# Patient Record
Sex: FEMALE | Race: BLACK OR AFRICAN AMERICAN | NOT HISPANIC OR LATINO | Employment: FULL TIME | URBAN - METROPOLITAN AREA
[De-identification: names, ages, dates, MRNs, and addresses within clinical notes are randomized per-mention and may not be internally consistent; named-entity substitution may affect disease eponyms.]

---

## 2017-05-15 ENCOUNTER — HISTORICAL (OUTPATIENT)
Dept: WOUND CARE | Facility: HOSPITAL | Age: 42
End: 2017-05-15

## 2019-04-25 ENCOUNTER — HISTORICAL (OUTPATIENT)
Dept: ADMINISTRATIVE | Facility: HOSPITAL | Age: 44
End: 2019-04-25

## 2019-04-27 LAB — FINAL CULTURE: NORMAL

## 2019-05-02 ENCOUNTER — HISTORICAL (OUTPATIENT)
Dept: ADMINISTRATIVE | Facility: HOSPITAL | Age: 44
End: 2019-05-02

## 2019-05-02 LAB
ABS NEUT (OLG): 6.08 X10(3)/MCL (ref 2.1–9.2)
ALBUMIN SERPL-MCNC: 3.2 GM/DL (ref 3.4–5)
ALBUMIN/GLOB SERPL: 0.7 RATIO (ref 1.1–2)
ALP SERPL-CCNC: 110 UNIT/L (ref 45–117)
ALT SERPL-CCNC: 19 UNIT/L (ref 12–78)
AST SERPL-CCNC: 17 UNIT/L (ref 15–37)
BASOPHILS # BLD AUTO: 0.04 X10(3)/MCL
BASOPHILS NFR BLD AUTO: 0 %
BILIRUB SERPL-MCNC: 0.3 MG/DL (ref 0.2–1)
BILIRUBIN DIRECT+TOT PNL SERPL-MCNC: 0.1 MG/DL
BILIRUBIN DIRECT+TOT PNL SERPL-MCNC: 0.2 MG/DL
BUN SERPL-MCNC: 12 MG/DL (ref 7–18)
CALCIUM SERPL-MCNC: 8.8 MG/DL (ref 8.5–10.1)
CHLORIDE SERPL-SCNC: 109 MMOL/L (ref 98–107)
CHOLEST SERPL-MCNC: 152 MG/DL
CHOLEST/HDLC SERPL: 2.4 {RATIO} (ref 0–4.4)
CO2 SERPL-SCNC: 28 MMOL/L (ref 21–32)
CREAT SERPL-MCNC: 0.8 MG/DL (ref 0.6–1.3)
EOSINOPHIL # BLD AUTO: 0.22 10*3/UL
EOSINOPHIL NFR BLD AUTO: 2 %
ERYTHROCYTE [DISTWIDTH] IN BLOOD BY AUTOMATED COUNT: 12.4 % (ref 11.5–14.5)
EST. AVERAGE GLUCOSE BLD GHB EST-MCNC: 123 MG/DL
GLOBULIN SER-MCNC: 4.9 GM/ML (ref 2.3–3.5)
GLUCOSE SERPL-MCNC: 90 MG/DL (ref 74–106)
HBA1C MFR BLD: 5.9 % (ref 4.2–6.3)
HCT VFR BLD AUTO: 40.5 % (ref 35–46)
HDLC SERPL-MCNC: 63 MG/DL
HGB BLD-MCNC: 13.3 GM/DL (ref 12–16)
HIV 1+2 AB+HIV1 P24 AG SERPL QL IA: NONREACTIVE
IMM GRANULOCYTES # BLD AUTO: 0.07 10*3/UL
IMM GRANULOCYTES NFR BLD AUTO: 1 %
LDLC SERPL CALC-MCNC: 76 MG/DL (ref 0–130)
LYMPHOCYTES # BLD AUTO: 2.38 X10(3)/MCL
LYMPHOCYTES NFR BLD AUTO: 25 % (ref 13–40)
MCH RBC QN AUTO: 31.6 PG (ref 26–34)
MCHC RBC AUTO-ENTMCNC: 32.8 GM/DL (ref 31–37)
MCV RBC AUTO: 96.2 FL (ref 80–100)
MONOCYTES # BLD AUTO: 0.59 X10(3)/MCL
MONOCYTES NFR BLD AUTO: 6 % (ref 4–12)
NEUTROPHILS # BLD AUTO: 6.08 X10(3)/MCL
NEUTROPHILS NFR BLD AUTO: 65 X10(3)/MCL
PLATELET # BLD AUTO: 318 X10(3)/MCL (ref 130–400)
PMV BLD AUTO: 9.9 FL (ref 7.4–10.4)
POTASSIUM SERPL-SCNC: 3.9 MMOL/L (ref 3.5–5.1)
PROT SERPL-MCNC: 8.1 GM/DL (ref 6.4–8.2)
RBC # BLD AUTO: 4.21 X10(6)/MCL (ref 4–5.2)
SODIUM SERPL-SCNC: 142 MMOL/L (ref 136–145)
TRIGL SERPL-MCNC: 65 MG/DL
VLDLC SERPL CALC-MCNC: 13 MG/DL
WBC # SPEC AUTO: 9.4 X10(3)/MCL (ref 4.5–11)

## 2019-11-11 ENCOUNTER — HISTORICAL (OUTPATIENT)
Dept: ADMINISTRATIVE | Facility: HOSPITAL | Age: 44
End: 2019-11-11

## 2019-11-11 LAB
ABS NEUT (OLG): 3.6 X10(3)/MCL (ref 2.1–9.2)
ALBUMIN SERPL-MCNC: 3.6 GM/DL (ref 3.4–5)
ALBUMIN/GLOB SERPL: 0.7 RATIO (ref 1.1–2)
ALP SERPL-CCNC: 92 UNIT/L (ref 45–117)
ALT SERPL-CCNC: 25 UNIT/L (ref 12–78)
AST SERPL-CCNC: 21 UNIT/L (ref 15–37)
BASOPHILS # BLD AUTO: 0 X10(3)/MCL (ref 0–0.2)
BASOPHILS NFR BLD AUTO: 1 %
BILIRUB SERPL-MCNC: 0.4 MG/DL (ref 0.2–1)
BILIRUBIN DIRECT+TOT PNL SERPL-MCNC: 0.1 MG/DL (ref 0–0.2)
BILIRUBIN DIRECT+TOT PNL SERPL-MCNC: 0.3 MG/DL
BUN SERPL-MCNC: 10 MG/DL (ref 7–18)
CALCIUM SERPL-MCNC: 9.2 MG/DL (ref 8.5–10.1)
CHLORIDE SERPL-SCNC: 106 MMOL/L (ref 98–107)
CO2 SERPL-SCNC: 29 MMOL/L (ref 21–32)
CREAT SERPL-MCNC: 0.8 MG/DL (ref 0.6–1.3)
EOSINOPHIL # BLD AUTO: 0.2 X10(3)/MCL (ref 0–0.9)
EOSINOPHIL NFR BLD AUTO: 3 %
ERYTHROCYTE [DISTWIDTH] IN BLOOD BY AUTOMATED COUNT: 12.3 % (ref 11.5–14.5)
GLOBULIN SER-MCNC: 5 GM/ML (ref 2.3–3.5)
GLUCOSE SERPL-MCNC: 111 MG/DL (ref 74–106)
HCT VFR BLD AUTO: 42.4 % (ref 35–46)
HGB BLD-MCNC: 13.8 GM/DL (ref 12–16)
IMM GRANULOCYTES # BLD AUTO: 0.03 10*3/UL
IMM GRANULOCYTES NFR BLD AUTO: 0 %
LYMPHOCYTES # BLD AUTO: 2.1 X10(3)/MCL (ref 0.6–4.6)
LYMPHOCYTES NFR BLD AUTO: 32 %
MCH RBC QN AUTO: 31.5 PG (ref 26–34)
MCHC RBC AUTO-ENTMCNC: 32.5 GM/DL (ref 31–37)
MCV RBC AUTO: 96.8 FL (ref 80–100)
MONOCYTES # BLD AUTO: 0.4 X10(3)/MCL (ref 0.1–1.3)
MONOCYTES NFR BLD AUTO: 7 %
NEUTROPHILS # BLD AUTO: 3.6 X10(3)/MCL (ref 2.1–9.2)
NEUTROPHILS NFR BLD AUTO: 57 %
PLATELET # BLD AUTO: 342 X10(3)/MCL (ref 130–400)
PMV BLD AUTO: 10.2 FL (ref 7.4–10.4)
POTASSIUM SERPL-SCNC: 4 MMOL/L (ref 3.5–5.1)
PROT SERPL-MCNC: 8.6 GM/DL (ref 6.4–8.2)
RBC # BLD AUTO: 4.38 X10(6)/MCL (ref 4–5.2)
SODIUM SERPL-SCNC: 140 MMOL/L (ref 136–145)
WBC # SPEC AUTO: 6.3 X10(3)/MCL (ref 4.5–11)

## 2019-11-22 ENCOUNTER — HISTORICAL (OUTPATIENT)
Dept: RADIOLOGY | Facility: HOSPITAL | Age: 44
End: 2019-11-22

## 2019-11-25 ENCOUNTER — HISTORICAL (OUTPATIENT)
Dept: ADMINISTRATIVE | Facility: HOSPITAL | Age: 44
End: 2019-11-25

## 2019-11-25 LAB
CANCER AG125 SERPL-ACNC: 11.9 IU/ML (ref 1.5–35)
CEA SERPL-MCNC: 9 NG/ML

## 2019-11-26 ENCOUNTER — HISTORICAL (OUTPATIENT)
Dept: ADMINISTRATIVE | Facility: HOSPITAL | Age: 44
End: 2019-11-26

## 2019-11-27 ENCOUNTER — HISTORICAL (OUTPATIENT)
Dept: SURGERY | Facility: HOSPITAL | Age: 44
End: 2019-11-27

## 2019-11-27 LAB — B-HCG SERPL QL: NEGATIVE

## 2019-12-03 ENCOUNTER — HISTORICAL (OUTPATIENT)
Dept: RADIOLOGY | Facility: HOSPITAL | Age: 44
End: 2019-12-03

## 2019-12-04 ENCOUNTER — HISTORICAL (OUTPATIENT)
Dept: ADMINISTRATIVE | Facility: HOSPITAL | Age: 44
End: 2019-12-04

## 2019-12-04 LAB
ABS NEUT (OLG): 3.36 X10(3)/MCL (ref 2.1–9.2)
ALBUMIN SERPL-MCNC: 3.4 GM/DL (ref 3.4–5)
ALBUMIN/GLOB SERPL: 0.7 RATIO (ref 1.1–2)
ALP SERPL-CCNC: 84 UNIT/L (ref 45–117)
ALT SERPL-CCNC: 19 UNIT/L (ref 12–78)
AST SERPL-CCNC: 14 UNIT/L (ref 15–37)
BASOPHILS # BLD AUTO: 0 X10(3)/MCL (ref 0–0.2)
BASOPHILS NFR BLD AUTO: 0 %
BILIRUB SERPL-MCNC: 0.4 MG/DL (ref 0.2–1)
BILIRUBIN DIRECT+TOT PNL SERPL-MCNC: 0.1 MG/DL (ref 0–0.2)
BILIRUBIN DIRECT+TOT PNL SERPL-MCNC: 0.3 MG/DL
BUN SERPL-MCNC: 11 MG/DL (ref 7–18)
CALCIUM SERPL-MCNC: 9 MG/DL (ref 8.5–10.1)
CHLORIDE SERPL-SCNC: 108 MMOL/L (ref 98–107)
CO2 SERPL-SCNC: 31 MMOL/L (ref 21–32)
CREAT SERPL-MCNC: 0.8 MG/DL (ref 0.6–1.3)
EOSINOPHIL # BLD AUTO: 0.2 X10(3)/MCL (ref 0–0.9)
EOSINOPHIL NFR BLD AUTO: 3 %
ERYTHROCYTE [DISTWIDTH] IN BLOOD BY AUTOMATED COUNT: 12.2 % (ref 11.5–14.5)
GLOBULIN SER-MCNC: 4.7 GM/ML (ref 2.3–3.5)
GLUCOSE SERPL-MCNC: 95 MG/DL (ref 74–106)
HCT VFR BLD AUTO: 40.4 % (ref 35–46)
HGB BLD-MCNC: 13.1 GM/DL (ref 12–16)
IMM GRANULOCYTES # BLD AUTO: 0.03 10*3/UL
IMM GRANULOCYTES NFR BLD AUTO: 0 %
LYMPHOCYTES # BLD AUTO: 1.9 X10(3)/MCL (ref 0.6–4.6)
LYMPHOCYTES NFR BLD AUTO: 32 %
MCH RBC QN AUTO: 31.6 PG (ref 26–34)
MCHC RBC AUTO-ENTMCNC: 32.4 GM/DL (ref 31–37)
MCV RBC AUTO: 97.3 FL (ref 80–100)
MONOCYTES # BLD AUTO: 0.4 X10(3)/MCL (ref 0.1–1.3)
MONOCYTES NFR BLD AUTO: 7 %
NEUTROPHILS # BLD AUTO: 3.36 X10(3)/MCL (ref 2.1–9.2)
NEUTROPHILS NFR BLD AUTO: 57 %
PLATELET # BLD AUTO: 296 X10(3)/MCL (ref 130–400)
PMV BLD AUTO: 10.1 FL (ref 7.4–10.4)
POTASSIUM SERPL-SCNC: 3.5 MMOL/L (ref 3.5–5.1)
PROT SERPL-MCNC: 8.1 GM/DL (ref 6.4–8.2)
RBC # BLD AUTO: 4.15 X10(6)/MCL (ref 4–5.2)
SODIUM SERPL-SCNC: 142 MMOL/L (ref 136–145)
WBC # SPEC AUTO: 5.9 X10(3)/MCL (ref 4.5–11)

## 2019-12-13 ENCOUNTER — HISTORICAL (OUTPATIENT)
Dept: INFUSION THERAPY | Facility: HOSPITAL | Age: 44
End: 2019-12-13

## 2019-12-13 LAB
ABS NEUT (OLG): 4.51 X10(3)/MCL (ref 2.1–9.2)
ALBUMIN SERPL-MCNC: 3.3 GM/DL (ref 3.4–5)
ALBUMIN/GLOB SERPL: 0.8 RATIO (ref 1.1–2)
ALP SERPL-CCNC: 69 UNIT/L (ref 45–117)
ALT SERPL-CCNC: 25 UNIT/L (ref 12–78)
AST SERPL-CCNC: 22 UNIT/L (ref 15–37)
BASOPHILS # BLD AUTO: 0 X10(3)/MCL (ref 0–0.2)
BASOPHILS NFR BLD AUTO: 0 %
BILIRUB SERPL-MCNC: 0.4 MG/DL (ref 0.2–1)
BILIRUBIN DIRECT+TOT PNL SERPL-MCNC: 0.1 MG/DL (ref 0–0.2)
BILIRUBIN DIRECT+TOT PNL SERPL-MCNC: 0.3 MG/DL
BUN SERPL-MCNC: 12 MG/DL (ref 7–18)
CALCIUM SERPL-MCNC: 8.9 MG/DL (ref 8.5–10.1)
CHLORIDE SERPL-SCNC: 106 MMOL/L (ref 98–107)
CO2 SERPL-SCNC: 31 MMOL/L (ref 21–32)
CREAT SERPL-MCNC: 0.7 MG/DL (ref 0.6–1.3)
EOSINOPHIL # BLD AUTO: 0.2 X10(3)/MCL (ref 0–0.9)
EOSINOPHIL NFR BLD AUTO: 2 %
ERYTHROCYTE [DISTWIDTH] IN BLOOD BY AUTOMATED COUNT: 12.1 % (ref 11.5–14.5)
GLOBULIN SER-MCNC: 4.4 GM/ML (ref 2.3–3.5)
GLUCOSE SERPL-MCNC: 103 MG/DL (ref 74–106)
HCT VFR BLD AUTO: 38.4 % (ref 35–46)
HGB BLD-MCNC: 12.5 GM/DL (ref 12–16)
IMM GRANULOCYTES # BLD AUTO: 0.04 10*3/UL
IMM GRANULOCYTES NFR BLD AUTO: 0 %
LYMPHOCYTES # BLD AUTO: 2.8 X10(3)/MCL (ref 0.6–4.6)
LYMPHOCYTES NFR BLD AUTO: 34 %
MCH RBC QN AUTO: 31.8 PG (ref 26–34)
MCHC RBC AUTO-ENTMCNC: 32.6 GM/DL (ref 31–37)
MCV RBC AUTO: 97.7 FL (ref 80–100)
MONOCYTES # BLD AUTO: 0.6 X10(3)/MCL (ref 0.1–1.3)
MONOCYTES NFR BLD AUTO: 7 %
NEUTROPHILS # BLD AUTO: 4.51 X10(3)/MCL (ref 2.1–9.2)
NEUTROPHILS NFR BLD AUTO: 56 %
PLATELET # BLD AUTO: 293 X10(3)/MCL (ref 130–400)
PMV BLD AUTO: 10.5 FL (ref 7.4–10.4)
POTASSIUM SERPL-SCNC: 3.5 MMOL/L (ref 3.5–5.1)
PROT SERPL-MCNC: 7.7 GM/DL (ref 6.4–8.2)
RBC # BLD AUTO: 3.93 X10(6)/MCL (ref 4–5.2)
SODIUM SERPL-SCNC: 140 MMOL/L (ref 136–145)
WBC # SPEC AUTO: 8.1 X10(3)/MCL (ref 4.5–11)

## 2019-12-18 ENCOUNTER — HISTORICAL (OUTPATIENT)
Dept: ADMINISTRATIVE | Facility: HOSPITAL | Age: 44
End: 2019-12-18

## 2019-12-18 LAB
ABS NEUT (OLG): 6.07 X10(3)/MCL (ref 2.1–9.2)
ALBUMIN SERPL-MCNC: 3.2 GM/DL (ref 3.4–5)
ALBUMIN/GLOB SERPL: 0.7 RATIO (ref 1.1–2)
ALP SERPL-CCNC: 104 UNIT/L (ref 45–117)
ALT SERPL-CCNC: 16 UNIT/L (ref 12–78)
AST SERPL-CCNC: 13 UNIT/L (ref 15–37)
BASOPHILS NFR BLD MANUAL: 0 %
BILIRUB SERPL-MCNC: 0.5 MG/DL (ref 0.2–1)
BILIRUBIN DIRECT+TOT PNL SERPL-MCNC: 0.1 MG/DL (ref 0–0.2)
BILIRUBIN DIRECT+TOT PNL SERPL-MCNC: 0.4 MG/DL
BUN SERPL-MCNC: 10 MG/DL (ref 7–18)
CALCIUM SERPL-MCNC: 9.2 MG/DL (ref 8.5–10.1)
CHLORIDE SERPL-SCNC: 102 MMOL/L (ref 98–107)
CO2 SERPL-SCNC: 31 MMOL/L (ref 21–32)
CREAT SERPL-MCNC: 0.7 MG/DL (ref 0.6–1.3)
EOSINOPHIL NFR BLD MANUAL: 4 %
ERYTHROCYTE [DISTWIDTH] IN BLOOD BY AUTOMATED COUNT: 12.2 % (ref 11.5–14.5)
GLOBULIN SER-MCNC: 4.3 GM/ML (ref 2.3–3.5)
GLUCOSE SERPL-MCNC: 109 MG/DL (ref 74–106)
GRANULOCYTES NFR BLD MANUAL: 73 % (ref 43–75)
HCT VFR BLD AUTO: 37.5 % (ref 35–46)
HGB BLD-MCNC: 12 GM/DL (ref 12–16)
LYMPHOCYTES NFR BLD MANUAL: 18 % (ref 20.5–51.1)
MCH RBC QN AUTO: 31.4 PG (ref 26–34)
MCHC RBC AUTO-ENTMCNC: 32 GM/DL (ref 31–37)
MCV RBC AUTO: 98.2 FL (ref 80–100)
MONOCYTES NFR BLD MANUAL: 1 % (ref 2–9)
NEUTS BAND NFR BLD MANUAL: 4 % (ref 0–10)
PLATELET # BLD AUTO: 230 X10(3)/MCL (ref 130–400)
PLATELET # BLD EST: ADEQUATE 10*3/UL
PMV BLD AUTO: 10.5 FL (ref 7.4–10.4)
POTASSIUM SERPL-SCNC: 3.5 MMOL/L (ref 3.5–5.1)
PROT SERPL-MCNC: 7.5 GM/DL (ref 6.4–8.2)
RBC # BLD AUTO: 3.82 X10(6)/MCL (ref 4–5.2)
RBC MORPH BLD: NORMAL
SODIUM SERPL-SCNC: 138 MMOL/L (ref 136–145)
WBC # SPEC AUTO: 9.2 X10(3)/MCL (ref 4.5–11)

## 2019-12-27 ENCOUNTER — HISTORICAL (OUTPATIENT)
Dept: INFUSION THERAPY | Facility: HOSPITAL | Age: 44
End: 2019-12-27

## 2019-12-27 LAB
ABS NEUT (OLG): 6.22 X10(3)/MCL (ref 2.1–9.2)
ALBUMIN SERPL-MCNC: 3.3 GM/DL (ref 3.4–5)
ALBUMIN/GLOB SERPL: 0.8 RATIO (ref 1.1–2)
ALP SERPL-CCNC: 82 UNIT/L (ref 45–117)
ALT SERPL-CCNC: 20 UNIT/L (ref 12–78)
ANISOCYTOSIS BLD QL SMEAR: ABNORMAL
AST SERPL-CCNC: 20 UNIT/L (ref 15–37)
BASOPHILS NFR BLD MANUAL: 0 %
BILIRUB SERPL-MCNC: <0.1 MG/DL (ref 0.2–1)
BILIRUBIN DIRECT+TOT PNL SERPL-MCNC: ABNORMAL MG/DL (ref 0–0.2)
BUN SERPL-MCNC: 13 MG/DL (ref 7–18)
CALCIUM SERPL-MCNC: 9.3 MG/DL (ref 8.5–10.1)
CHLORIDE SERPL-SCNC: 107 MMOL/L (ref 98–107)
CO2 SERPL-SCNC: 30 MMOL/L (ref 21–32)
CREAT SERPL-MCNC: 0.8 MG/DL (ref 0.6–1.3)
EOSINOPHIL NFR BLD MANUAL: 0 %
ERYTHROCYTE [DISTWIDTH] IN BLOOD BY AUTOMATED COUNT: 12.5 % (ref 11.5–14.5)
GLOBULIN SER-MCNC: 4.3 GM/ML (ref 2.3–3.5)
GLUCOSE SERPL-MCNC: 92 MG/DL (ref 74–106)
GRANULOCYTES NFR BLD MANUAL: 57 % (ref 43–75)
HCT VFR BLD AUTO: 36.4 % (ref 35–46)
HGB BLD-MCNC: 12 GM/DL (ref 12–16)
LYMPHOCYTES NFR BLD MANUAL: 14 % (ref 20.5–51.1)
LYMPHOCYTES NFR BLD MANUAL: 3 %
MACROCYTES BLD QL SMEAR: ABNORMAL
MCH RBC QN AUTO: 32.3 PG (ref 26–34)
MCHC RBC AUTO-ENTMCNC: 33 GM/DL (ref 31–37)
MCV RBC AUTO: 97.8 FL (ref 80–100)
METAMYELOCYTES NFR BLD MANUAL: 2 %
MONOCYTES NFR BLD MANUAL: 7 % (ref 2–9)
MYELOCYTES NFR BLD MANUAL: 2 %
NEUTS BAND NFR BLD MANUAL: 15 % (ref 0–10)
PLATELET # BLD AUTO: 304 X10(3)/MCL (ref 130–400)
PLATELET # BLD EST: ADEQUATE 10*3/UL
PMV BLD AUTO: 10 FL (ref 7.4–10.4)
POTASSIUM SERPL-SCNC: 3.6 MMOL/L (ref 3.5–5.1)
PROT SERPL-MCNC: 7.6 GM/DL (ref 6.4–8.2)
RBC # BLD AUTO: 3.72 X10(6)/MCL (ref 4–5.2)
RBC MORPH BLD: ABNORMAL
SODIUM SERPL-SCNC: 141 MMOL/L (ref 136–145)
WBC # SPEC AUTO: 11.7 X10(3)/MCL (ref 4.5–11)

## 2020-01-08 ENCOUNTER — HISTORICAL (OUTPATIENT)
Dept: ADMINISTRATIVE | Facility: HOSPITAL | Age: 45
End: 2020-01-08

## 2020-01-08 LAB
ABS NEUT (OLG): 3.24 X10(3)/MCL (ref 2.1–9.2)
ALBUMIN SERPL-MCNC: 3.2 GM/DL (ref 3.4–5)
ALBUMIN/GLOB SERPL: 0.8 RATIO (ref 1.1–2)
ALP SERPL-CCNC: 90 UNIT/L (ref 45–117)
ALT SERPL-CCNC: 23 UNIT/L (ref 12–78)
ANISOCYTOSIS BLD QL SMEAR: ABNORMAL
AST SERPL-CCNC: 21 UNIT/L (ref 15–37)
BASOPHILS NFR BLD MANUAL: 0 %
BILIRUB SERPL-MCNC: 0.1 MG/DL (ref 0.2–1)
BILIRUBIN DIRECT+TOT PNL SERPL-MCNC: <0.1 MG/DL (ref 0–0.2)
BILIRUBIN DIRECT+TOT PNL SERPL-MCNC: ABNORMAL MG/DL
BUN SERPL-MCNC: 13 MG/DL (ref 7–18)
CALCIUM SERPL-MCNC: 8.7 MG/DL (ref 8.5–10.1)
CHLORIDE SERPL-SCNC: 106 MMOL/L (ref 98–107)
CO2 SERPL-SCNC: 29 MMOL/L (ref 21–32)
CREAT SERPL-MCNC: 0.8 MG/DL (ref 0.6–1.3)
EOSINOPHIL NFR BLD MANUAL: 0 %
ERYTHROCYTE [DISTWIDTH] IN BLOOD BY AUTOMATED COUNT: 12.3 % (ref 11.5–14.5)
GLOBULIN SER-MCNC: 4 GM/ML (ref 2.3–3.5)
GLUCOSE SERPL-MCNC: 139 MG/DL (ref 74–106)
GRANULOCYTES NFR BLD MANUAL: 50 % (ref 43–75)
HCT VFR BLD AUTO: 33.5 % (ref 35–46)
HGB BLD-MCNC: 11.1 GM/DL (ref 12–16)
HYPOCHROMIA BLD QL SMEAR: ABNORMAL
LYMPHOCYTES NFR BLD MANUAL: 1 %
LYMPHOCYTES NFR BLD MANUAL: 23 % (ref 20.5–51.1)
MCH RBC QN AUTO: 32.3 PG (ref 26–34)
MCHC RBC AUTO-ENTMCNC: 33.1 GM/DL (ref 31–37)
MCV RBC AUTO: 97.4 FL (ref 80–100)
METAMYELOCYTES NFR BLD MANUAL: 3 %
MONOCYTES NFR BLD MANUAL: 13 % (ref 2–9)
MYELOCYTES NFR BLD MANUAL: 2 %
NEUTS BAND NFR BLD MANUAL: 8 % (ref 0–10)
PLATELET # BLD AUTO: 276 X10(3)/MCL (ref 130–400)
PLATELET # BLD EST: ADEQUATE 10*3/UL
PMV BLD AUTO: 10 FL (ref 7.4–10.4)
POLYCHROMASIA BLD QL SMEAR: ABNORMAL
POTASSIUM SERPL-SCNC: 3.5 MMOL/L (ref 3.5–5.1)
PROT SERPL-MCNC: 7.2 GM/DL (ref 6.4–8.2)
RBC # BLD AUTO: 3.44 X10(6)/MCL (ref 4–5.2)
RBC MORPH BLD: ABNORMAL
SODIUM SERPL-SCNC: 141 MMOL/L (ref 136–145)
WBC # SPEC AUTO: 7 X10(3)/MCL (ref 4.5–11)

## 2020-01-10 ENCOUNTER — HISTORICAL (OUTPATIENT)
Dept: INFUSION THERAPY | Facility: HOSPITAL | Age: 45
End: 2020-01-10

## 2020-01-22 ENCOUNTER — HISTORICAL (OUTPATIENT)
Dept: ADMINISTRATIVE | Facility: HOSPITAL | Age: 45
End: 2020-01-22

## 2020-01-22 LAB
ABS NEUT (OLG): 5.48 X10(3)/MCL (ref 2.1–9.2)
ALBUMIN SERPL-MCNC: 3.3 GM/DL (ref 3.4–5)
ALBUMIN/GLOB SERPL: 0.8 RATIO (ref 1.1–2)
ALP SERPL-CCNC: 100 UNIT/L (ref 45–117)
ALT SERPL-CCNC: 18 UNIT/L (ref 12–78)
ANISOCYTOSIS BLD QL SMEAR: ABNORMAL
AST SERPL-CCNC: 18 UNIT/L (ref 15–37)
BASOPHILS NFR BLD MANUAL: 0 %
BILIRUB SERPL-MCNC: <0.1 MG/DL (ref 0.2–1)
BILIRUBIN DIRECT+TOT PNL SERPL-MCNC: <0.1 MG/DL (ref 0–0.2)
BILIRUBIN DIRECT+TOT PNL SERPL-MCNC: ABNORMAL MG/DL
BUN SERPL-MCNC: 10 MG/DL (ref 7–18)
CALCIUM SERPL-MCNC: 8.9 MG/DL (ref 8.5–10.1)
CHLORIDE SERPL-SCNC: 107 MMOL/L (ref 98–107)
CO2 SERPL-SCNC: 30 MMOL/L (ref 21–32)
CREAT SERPL-MCNC: 0.7 MG/DL (ref 0.6–1.3)
EOSINOPHIL NFR BLD MANUAL: 1 %
ERYTHROCYTE [DISTWIDTH] IN BLOOD BY AUTOMATED COUNT: 14.4 % (ref 11.5–14.5)
GLOBULIN SER-MCNC: 4.1 GM/ML (ref 2.3–3.5)
GLUCOSE SERPL-MCNC: 128 MG/DL (ref 74–106)
GRANULOCYTES NFR BLD MANUAL: 51 % (ref 43–75)
HCT VFR BLD AUTO: 34.6 % (ref 35–46)
HGB BLD-MCNC: 11.3 GM/DL (ref 12–16)
HYPOCHROMIA BLD QL SMEAR: ABNORMAL
LYMPHOCYTES NFR BLD MANUAL: 12 % (ref 20.5–51.1)
LYMPHOCYTES NFR BLD MANUAL: 5 %
MACROCYTES BLD QL SMEAR: ABNORMAL
MCH RBC QN AUTO: 32.6 PG (ref 26–34)
MCHC RBC AUTO-ENTMCNC: 32.7 GM/DL (ref 31–37)
MCV RBC AUTO: 99.7 FL (ref 80–100)
METAMYELOCYTES NFR BLD MANUAL: 4 %
MICROCYTES BLD QL SMEAR: ABNORMAL
MONOCYTES NFR BLD MANUAL: 6 % (ref 2–9)
MYELOCYTES NFR BLD MANUAL: 11 %
NEUTS BAND NFR BLD MANUAL: 10 % (ref 0–10)
NRBC BLD MANUAL-RTO: 3 %
PLATELET # BLD AUTO: 319 X10(3)/MCL (ref 130–400)
PLATELET # BLD EST: ADEQUATE 10*3/UL
PMV BLD AUTO: 9.7 FL (ref 7.4–10.4)
POIKILOCYTOSIS BLD QL SMEAR: ABNORMAL
POLYCHROMASIA BLD QL SMEAR: ABNORMAL
POTASSIUM SERPL-SCNC: 3.7 MMOL/L (ref 3.5–5.1)
PROT SERPL-MCNC: 7.4 GM/DL (ref 6.4–8.2)
RBC # BLD AUTO: 3.47 X10(6)/MCL (ref 4–5.2)
RBC MORPH BLD: ABNORMAL
SODIUM SERPL-SCNC: 141 MMOL/L (ref 136–145)
WBC # SPEC AUTO: 11.9 X10(3)/MCL (ref 4.5–11)

## 2020-01-24 ENCOUNTER — HISTORICAL (OUTPATIENT)
Dept: INFUSION THERAPY | Facility: HOSPITAL | Age: 45
End: 2020-01-24

## 2020-02-05 ENCOUNTER — HISTORICAL (OUTPATIENT)
Dept: ADMINISTRATIVE | Facility: HOSPITAL | Age: 45
End: 2020-02-05

## 2020-02-05 LAB
ALBUMIN SERPL-MCNC: 3.2 GM/DL (ref 3.4–5)
ALBUMIN/GLOB SERPL: 0.7 RATIO (ref 1.1–2)
ALP SERPL-CCNC: 92 UNIT/L (ref 45–117)
ALT SERPL-CCNC: 19 UNIT/L (ref 12–78)
ANISOCYTOSIS BLD QL SMEAR: ABNORMAL
AST SERPL-CCNC: 16 UNIT/L (ref 15–37)
BASOPHILS NFR BLD MANUAL: 0 %
BILIRUB SERPL-MCNC: 0.2 MG/DL (ref 0.2–1)
BILIRUBIN DIRECT+TOT PNL SERPL-MCNC: <0.1 MG/DL (ref 0–0.2)
BILIRUBIN DIRECT+TOT PNL SERPL-MCNC: ABNORMAL MG/DL
BUN SERPL-MCNC: 8 MG/DL (ref 7–18)
CALCIUM SERPL-MCNC: 9.2 MG/DL (ref 8.5–10.1)
CHLORIDE SERPL-SCNC: 106 MMOL/L (ref 98–107)
CO2 SERPL-SCNC: 32 MMOL/L (ref 21–32)
CREAT SERPL-MCNC: 0.7 MG/DL (ref 0.6–1.3)
EOSINOPHIL NFR BLD MANUAL: 0 %
ERYTHROCYTE [DISTWIDTH] IN BLOOD BY AUTOMATED COUNT: 14.5 % (ref 11.5–14.5)
GLOBULIN SER-MCNC: 4.9 GM/ML (ref 2.3–3.5)
GLUCOSE SERPL-MCNC: 96 MG/DL (ref 74–106)
GRANULOCYTES NFR BLD MANUAL: 40 % (ref 43–75)
HCT VFR BLD AUTO: 32.9 % (ref 35–46)
HGB BLD-MCNC: 10.9 GM/DL (ref 12–16)
LYMPHOCYTES NFR BLD MANUAL: 14 % (ref 20.5–51.1)
LYMPHOCYTES NFR BLD MANUAL: 2 %
MAGNESIUM SERPL-MCNC: 2.1 MG/DL (ref 1.6–2.6)
MCH RBC QN AUTO: 32.8 PG (ref 26–34)
MCHC RBC AUTO-ENTMCNC: 33.1 GM/DL (ref 31–37)
MCV RBC AUTO: 99.1 FL (ref 80–100)
METAMYELOCYTES NFR BLD MANUAL: 5 %
MONOCYTES NFR BLD MANUAL: 17 % (ref 2–9)
MYELOCYTES NFR BLD MANUAL: 5 %
NEUTS BAND NFR BLD MANUAL: 16 % (ref 0–10)
NRBC BLD MANUAL-RTO: 1 %
PLATELET # BLD AUTO: 269 X10(3)/MCL (ref 130–400)
PLATELET # BLD EST: ADEQUATE 10*3/UL
PMV BLD AUTO: 9.8 FL (ref 7.4–10.4)
POLYCHROMASIA BLD QL SMEAR: ABNORMAL
POTASSIUM SERPL-SCNC: 3.3 MMOL/L (ref 3.5–5.1)
PROT SERPL-MCNC: 8.1 GM/DL (ref 6.4–8.2)
RBC # BLD AUTO: 3.32 X10(6)/MCL (ref 4–5.2)
RBC MORPH BLD: ABNORMAL
SODIUM SERPL-SCNC: 138 MMOL/L (ref 136–145)
WBC # SPEC AUTO: 14.6 X10(3)/MCL (ref 4.5–11)

## 2020-02-07 ENCOUNTER — HISTORICAL (OUTPATIENT)
Dept: INFUSION THERAPY | Facility: HOSPITAL | Age: 45
End: 2020-02-07

## 2020-02-14 ENCOUNTER — HISTORICAL (OUTPATIENT)
Dept: INFUSION THERAPY | Facility: HOSPITAL | Age: 45
End: 2020-02-14

## 2020-02-14 LAB
ABS NEUT (OLG): 9.68 X10(3)/MCL (ref 2.1–9.2)
ALBUMIN SERPL-MCNC: 3.3 GM/DL (ref 3.4–5)
ALBUMIN/GLOB SERPL: 0.7 RATIO (ref 1.1–2)
ALP SERPL-CCNC: 95 UNIT/L (ref 45–117)
ALT SERPL-CCNC: 38 UNIT/L (ref 12–78)
AST SERPL-CCNC: 21 UNIT/L (ref 15–37)
BASOPHILS # BLD AUTO: 0 X10(3)/MCL (ref 0–0.2)
BASOPHILS NFR BLD AUTO: 0 %
BILIRUB SERPL-MCNC: 0.2 MG/DL (ref 0.2–1)
BILIRUBIN DIRECT+TOT PNL SERPL-MCNC: <0.1 MG/DL (ref 0–0.2)
BILIRUBIN DIRECT+TOT PNL SERPL-MCNC: >0.1 MG/DL
BUN SERPL-MCNC: 10 MG/DL (ref 7–18)
CALCIUM SERPL-MCNC: 9.4 MG/DL (ref 8.5–10.1)
CHLORIDE SERPL-SCNC: 108 MMOL/L (ref 98–107)
CO2 SERPL-SCNC: 26 MMOL/L (ref 21–32)
CREAT SERPL-MCNC: 0.8 MG/DL (ref 0.6–1.3)
ERYTHROCYTE [DISTWIDTH] IN BLOOD BY AUTOMATED COUNT: 14.7 % (ref 11.5–14.5)
GLOBULIN SER-MCNC: 4.6 GM/ML (ref 2.3–3.5)
GLUCOSE SERPL-MCNC: 189 MG/DL (ref 74–106)
HCT VFR BLD AUTO: 32.1 % (ref 35–46)
HGB BLD-MCNC: 10.7 GM/DL (ref 12–16)
IMM GRANULOCYTES # BLD AUTO: 0.46 10*3/UL
IMM GRANULOCYTES NFR BLD AUTO: 4 %
LYMPHOCYTES # BLD AUTO: 0.6 X10(3)/MCL (ref 0.6–4.6)
LYMPHOCYTES NFR BLD AUTO: 6 %
MCH RBC QN AUTO: 33.1 PG (ref 26–34)
MCHC RBC AUTO-ENTMCNC: 33.3 GM/DL (ref 31–37)
MCV RBC AUTO: 99.4 FL (ref 80–100)
MONOCYTES # BLD AUTO: 0.1 X10(3)/MCL (ref 0.1–1.3)
MONOCYTES NFR BLD AUTO: 1 %
NEUTROPHILS # BLD AUTO: 9.68 X10(3)/MCL (ref 2.1–9.2)
NEUTROPHILS NFR BLD AUTO: 89 %
PLATELET # BLD AUTO: 487 X10(3)/MCL (ref 130–400)
PMV BLD AUTO: 9.4 FL (ref 7.4–10.4)
POTASSIUM SERPL-SCNC: 4 MMOL/L (ref 3.5–5.1)
PROT SERPL-MCNC: 7.9 GM/DL (ref 6.4–8.2)
RBC # BLD AUTO: 3.23 X10(6)/MCL (ref 4–5.2)
SODIUM SERPL-SCNC: 139 MMOL/L (ref 136–145)
WBC # SPEC AUTO: 10.9 X10(3)/MCL (ref 4.5–11)

## 2020-02-19 ENCOUNTER — HISTORICAL (OUTPATIENT)
Dept: ADMINISTRATIVE | Facility: HOSPITAL | Age: 45
End: 2020-02-19

## 2020-02-19 LAB
ABS NEUT (OLG): 2.49 X10(3)/MCL (ref 2.1–9.2)
ALBUMIN SERPL-MCNC: 3.1 GM/DL (ref 3.4–5)
ALBUMIN/GLOB SERPL: 0.8 RATIO (ref 1.1–2)
ALP SERPL-CCNC: 70 UNIT/L (ref 45–117)
ALT SERPL-CCNC: 37 UNIT/L (ref 12–78)
AST SERPL-CCNC: 19 UNIT/L (ref 15–37)
BASOPHILS # BLD AUTO: 0 X10(3)/MCL (ref 0–0.2)
BASOPHILS NFR BLD AUTO: 0 %
BILIRUB SERPL-MCNC: 0.6 MG/DL (ref 0.2–1)
BILIRUBIN DIRECT+TOT PNL SERPL-MCNC: 0.2 MG/DL (ref 0–0.2)
BILIRUBIN DIRECT+TOT PNL SERPL-MCNC: 0.4 MG/DL
BUN SERPL-MCNC: 10 MG/DL (ref 7–18)
CALCIUM SERPL-MCNC: 8.7 MG/DL (ref 8.5–10.1)
CHLORIDE SERPL-SCNC: 107 MMOL/L (ref 98–107)
CO2 SERPL-SCNC: 30 MMOL/L (ref 21–32)
CREAT SERPL-MCNC: 0.7 MG/DL (ref 0.6–1.3)
EOSINOPHIL # BLD AUTO: 0 X10(3)/MCL (ref 0–0.9)
EOSINOPHIL NFR BLD AUTO: 1 %
ERYTHROCYTE [DISTWIDTH] IN BLOOD BY AUTOMATED COUNT: 15.4 % (ref 11.5–14.5)
GLOBULIN SER-MCNC: 4 GM/ML (ref 2.3–3.5)
GLUCOSE SERPL-MCNC: 133 MG/DL (ref 74–106)
HCT VFR BLD AUTO: 32.1 % (ref 35–46)
HGB BLD-MCNC: 10.4 GM/DL (ref 12–16)
IMM GRANULOCYTES # BLD AUTO: 0.03 10*3/UL
IMM GRANULOCYTES NFR BLD AUTO: 1 %
LYMPHOCYTES # BLD AUTO: 0.9 X10(3)/MCL (ref 0.6–4.6)
LYMPHOCYTES NFR BLD AUTO: 25 %
MCH RBC QN AUTO: 32.7 PG (ref 26–34)
MCHC RBC AUTO-ENTMCNC: 32.4 GM/DL (ref 31–37)
MCV RBC AUTO: 100.9 FL (ref 80–100)
MONOCYTES # BLD AUTO: 0.2 X10(3)/MCL (ref 0.1–1.3)
MONOCYTES NFR BLD AUTO: 5 %
NEUTROPHILS # BLD AUTO: 2.49 X10(3)/MCL (ref 2.1–9.2)
NEUTROPHILS NFR BLD AUTO: 68 %
PLATELET # BLD AUTO: 354 X10(3)/MCL (ref 130–400)
PMV BLD AUTO: 9.6 FL (ref 7.4–10.4)
POTASSIUM SERPL-SCNC: 3.8 MMOL/L (ref 3.5–5.1)
PROT SERPL-MCNC: 7.1 GM/DL (ref 6.4–8.2)
RBC # BLD AUTO: 3.18 X10(6)/MCL (ref 4–5.2)
SODIUM SERPL-SCNC: 140 MMOL/L (ref 136–145)
WBC # SPEC AUTO: 3.7 X10(3)/MCL (ref 4.5–11)

## 2020-02-21 ENCOUNTER — HISTORICAL (OUTPATIENT)
Dept: INFUSION THERAPY | Facility: HOSPITAL | Age: 45
End: 2020-02-21

## 2020-02-28 ENCOUNTER — HISTORICAL (OUTPATIENT)
Dept: INFUSION THERAPY | Facility: HOSPITAL | Age: 45
End: 2020-02-28

## 2020-02-28 LAB
ABS NEUT (OLG): 5.82 X10(3)/MCL (ref 2.1–9.2)
ALBUMIN SERPL-MCNC: 3.4 GM/DL (ref 3.4–5)
ALBUMIN/GLOB SERPL: 0.7 RATIO (ref 1.1–2)
ALP SERPL-CCNC: 100 UNIT/L (ref 45–117)
ALT SERPL-CCNC: 39 UNIT/L (ref 12–78)
AST SERPL-CCNC: 17 UNIT/L (ref 15–37)
BASOPHILS # BLD AUTO: 0 X10(3)/MCL (ref 0–0.2)
BASOPHILS NFR BLD AUTO: 0 %
BILIRUB SERPL-MCNC: 0.3 MG/DL (ref 0.2–1)
BILIRUBIN DIRECT+TOT PNL SERPL-MCNC: 0.1 MG/DL (ref 0–0.2)
BILIRUBIN DIRECT+TOT PNL SERPL-MCNC: 0.2 MG/DL
BUN SERPL-MCNC: 12 MG/DL (ref 7–18)
CALCIUM SERPL-MCNC: 9.3 MG/DL (ref 8.5–10.1)
CHLORIDE SERPL-SCNC: 107 MMOL/L (ref 98–107)
CO2 SERPL-SCNC: 25 MMOL/L (ref 21–32)
CREAT SERPL-MCNC: 0.8 MG/DL (ref 0.6–1.3)
ERYTHROCYTE [DISTWIDTH] IN BLOOD BY AUTOMATED COUNT: 15.8 % (ref 11.5–14.5)
GLOBULIN SER-MCNC: 4.6 GM/ML (ref 2.3–3.5)
GLUCOSE SERPL-MCNC: 247 MG/DL (ref 74–106)
HCT VFR BLD AUTO: 33.1 % (ref 35–46)
HGB BLD-MCNC: 11.1 GM/DL (ref 12–16)
IMM GRANULOCYTES # BLD AUTO: 0.09 10*3/UL
IMM GRANULOCYTES NFR BLD AUTO: 1 %
LYMPHOCYTES # BLD AUTO: 0.4 X10(3)/MCL (ref 0.6–4.6)
LYMPHOCYTES NFR BLD AUTO: 7 %
MCH RBC QN AUTO: 34 PG (ref 26–34)
MCHC RBC AUTO-ENTMCNC: 33.5 GM/DL (ref 31–37)
MCV RBC AUTO: 101.5 FL (ref 80–100)
MONOCYTES # BLD AUTO: 0 X10(3)/MCL (ref 0.1–1.3)
MONOCYTES NFR BLD AUTO: 0 %
NEUTROPHILS # BLD AUTO: 5.82 X10(3)/MCL (ref 2.1–9.2)
NEUTROPHILS NFR BLD AUTO: 91 %
PLATELET # BLD AUTO: 344 X10(3)/MCL (ref 130–400)
PMV BLD AUTO: 9.6 FL (ref 7.4–10.4)
POTASSIUM SERPL-SCNC: 3.7 MMOL/L (ref 3.5–5.1)
PROT SERPL-MCNC: 8 GM/DL (ref 6.4–8.2)
RBC # BLD AUTO: 3.26 X10(6)/MCL (ref 4–5.2)
SODIUM SERPL-SCNC: 138 MMOL/L (ref 136–145)
WBC # SPEC AUTO: 6.4 X10(3)/MCL (ref 4.5–11)

## 2020-03-04 ENCOUNTER — HISTORICAL (OUTPATIENT)
Dept: ADMINISTRATIVE | Facility: HOSPITAL | Age: 45
End: 2020-03-04

## 2020-03-04 LAB
ABS NEUT (OLG): 3.61 X10(3)/MCL (ref 2.1–9.2)
ALBUMIN SERPL-MCNC: 3 GM/DL (ref 3.4–5)
ALBUMIN/GLOB SERPL: 0.8 RATIO (ref 1.1–2)
ALP SERPL-CCNC: 83 UNIT/L (ref 45–117)
ALT SERPL-CCNC: 39 UNIT/L (ref 12–78)
AST SERPL-CCNC: 26 UNIT/L (ref 15–37)
BASOPHILS # BLD AUTO: 0 X10(3)/MCL (ref 0–0.2)
BASOPHILS NFR BLD AUTO: 0 %
BILIRUB SERPL-MCNC: 0.6 MG/DL (ref 0.2–1)
BILIRUBIN DIRECT+TOT PNL SERPL-MCNC: 0.2 MG/DL (ref 0–0.2)
BILIRUBIN DIRECT+TOT PNL SERPL-MCNC: 0.4 MG/DL
BUN SERPL-MCNC: 12 MG/DL (ref 7–18)
CALCIUM SERPL-MCNC: 8.6 MG/DL (ref 8.5–10.1)
CHLORIDE SERPL-SCNC: 107 MMOL/L (ref 98–107)
CO2 SERPL-SCNC: 28 MMOL/L (ref 21–32)
CREAT SERPL-MCNC: 0.8 MG/DL (ref 0.6–1.3)
EOSINOPHIL # BLD AUTO: 0.1 X10(3)/MCL (ref 0–0.9)
EOSINOPHIL NFR BLD AUTO: 2 %
ERYTHROCYTE [DISTWIDTH] IN BLOOD BY AUTOMATED COUNT: 15.1 % (ref 11.5–14.5)
GLOBULIN SER-MCNC: 3.7 GM/ML (ref 2.3–3.5)
GLUCOSE SERPL-MCNC: 193 MG/DL (ref 74–106)
HCT VFR BLD AUTO: 29.8 % (ref 35–46)
HGB BLD-MCNC: 9.8 GM/DL (ref 12–16)
IMM GRANULOCYTES # BLD AUTO: 0.03 10*3/UL
IMM GRANULOCYTES NFR BLD AUTO: 1 %
LYMPHOCYTES # BLD AUTO: 1 X10(3)/MCL (ref 0.6–4.6)
LYMPHOCYTES NFR BLD AUTO: 21 %
MCH RBC QN AUTO: 33.7 PG (ref 26–34)
MCHC RBC AUTO-ENTMCNC: 32.9 GM/DL (ref 31–37)
MCV RBC AUTO: 102.4 FL (ref 80–100)
MONOCYTES # BLD AUTO: 0.2 X10(3)/MCL (ref 0.1–1.3)
MONOCYTES NFR BLD AUTO: 4 %
NEUTROPHILS # BLD AUTO: 3.61 X10(3)/MCL (ref 2.1–9.2)
NEUTROPHILS NFR BLD AUTO: 73 %
PLATELET # BLD AUTO: 266 X10(3)/MCL (ref 130–400)
PMV BLD AUTO: 9.7 FL (ref 7.4–10.4)
POTASSIUM SERPL-SCNC: 3.6 MMOL/L (ref 3.5–5.1)
PROT SERPL-MCNC: 6.7 GM/DL (ref 6.4–8.2)
RBC # BLD AUTO: 2.91 X10(6)/MCL (ref 4–5.2)
SODIUM SERPL-SCNC: 139 MMOL/L (ref 136–145)
WBC # SPEC AUTO: 5 X10(3)/MCL (ref 4.5–11)

## 2020-03-06 ENCOUNTER — HISTORICAL (OUTPATIENT)
Dept: INFUSION THERAPY | Facility: HOSPITAL | Age: 45
End: 2020-03-06

## 2020-03-13 ENCOUNTER — HISTORICAL (OUTPATIENT)
Dept: INFUSION THERAPY | Facility: HOSPITAL | Age: 45
End: 2020-03-13

## 2020-03-13 LAB
ABS NEUT (OLG): 4.76 X10(3)/MCL (ref 2.1–9.2)
ALBUMIN SERPL-MCNC: 3.4 GM/DL (ref 3.4–5)
ALBUMIN/GLOB SERPL: 0.8 RATIO (ref 1.1–2)
ALP SERPL-CCNC: 102 UNIT/L (ref 45–117)
ALT SERPL-CCNC: 31 UNIT/L (ref 12–78)
ANISOCYTOSIS BLD QL SMEAR: ABNORMAL
AST SERPL-CCNC: 13 UNIT/L (ref 15–37)
BASOPHILS NFR BLD MANUAL: 0 %
BILIRUB SERPL-MCNC: 0.2 MG/DL (ref 0.2–1)
BILIRUBIN DIRECT+TOT PNL SERPL-MCNC: <0.1 MG/DL (ref 0–0.2)
BILIRUBIN DIRECT+TOT PNL SERPL-MCNC: ABNORMAL MG/DL
BUN SERPL-MCNC: 10 MG/DL (ref 7–18)
CALCIUM SERPL-MCNC: 9.4 MG/DL (ref 8.5–10.1)
CHLORIDE SERPL-SCNC: 106 MMOL/L (ref 98–107)
CO2 SERPL-SCNC: 27 MMOL/L (ref 21–32)
CREAT SERPL-MCNC: 0.7 MG/DL (ref 0.6–1.3)
EOSINOPHIL NFR BLD MANUAL: 0 %
ERYTHROCYTE [DISTWIDTH] IN BLOOD BY AUTOMATED COUNT: 14.8 % (ref 11.5–14.5)
GLOBULIN SER-MCNC: 4.5 GM/ML (ref 2.3–3.5)
GLUCOSE SERPL-MCNC: 234 MG/DL (ref 74–106)
GRANULOCYTES NFR BLD MANUAL: 76 % (ref 43–75)
HCT VFR BLD AUTO: 33 % (ref 35–46)
HGB BLD-MCNC: 11 GM/DL (ref 12–16)
LYMPHOCYTES NFR BLD MANUAL: 1 %
LYMPHOCYTES NFR BLD MANUAL: 11 % (ref 20.5–51.1)
MCH RBC QN AUTO: 34.1 PG (ref 26–34)
MCHC RBC AUTO-ENTMCNC: 33.3 GM/DL (ref 31–37)
MCV RBC AUTO: 102.2 FL (ref 80–100)
METAMYELOCYTES NFR BLD MANUAL: 2 %
MONOCYTES NFR BLD MANUAL: 0 % (ref 2–9)
NEUTS BAND NFR BLD MANUAL: 10 % (ref 0–10)
PLATELET # BLD AUTO: 338 X10(3)/MCL (ref 130–400)
PLATELET # BLD EST: ADEQUATE 10*3/UL
PMV BLD AUTO: 9.4 FL (ref 7.4–10.4)
POTASSIUM SERPL-SCNC: 3.6 MMOL/L (ref 3.5–5.1)
PROT SERPL-MCNC: 7.9 GM/DL (ref 6.4–8.2)
RBC # BLD AUTO: 3.23 X10(6)/MCL (ref 4–5.2)
RBC MORPH BLD: ABNORMAL
SODIUM SERPL-SCNC: 138 MMOL/L (ref 136–145)
WBC # SPEC AUTO: 5.6 X10(3)/MCL (ref 4.5–11)

## 2020-03-18 ENCOUNTER — HISTORICAL (OUTPATIENT)
Dept: ADMINISTRATIVE | Facility: HOSPITAL | Age: 45
End: 2020-03-18

## 2020-03-18 LAB
ABS NEUT (OLG): 2.51 X10(3)/MCL (ref 2.1–9.2)
ALBUMIN SERPL-MCNC: 3.3 GM/DL (ref 3.4–5)
ALBUMIN/GLOB SERPL: 0.8 RATIO (ref 1.1–2)
ALP SERPL-CCNC: 78 UNIT/L (ref 45–117)
ALT SERPL-CCNC: 29 UNIT/L (ref 12–78)
AST SERPL-CCNC: 17 UNIT/L (ref 15–37)
BASOPHILS # BLD AUTO: 0 X10(3)/MCL (ref 0–0.2)
BASOPHILS NFR BLD AUTO: 0 %
BILIRUB SERPL-MCNC: 0.5 MG/DL (ref 0.2–1)
BILIRUBIN DIRECT+TOT PNL SERPL-MCNC: 0.2 MG/DL (ref 0–0.2)
BILIRUBIN DIRECT+TOT PNL SERPL-MCNC: 0.3 MG/DL
BUN SERPL-MCNC: 10 MG/DL (ref 7–18)
CALCIUM SERPL-MCNC: 9.3 MG/DL (ref 8.5–10.1)
CHLORIDE SERPL-SCNC: 108 MMOL/L (ref 98–107)
CO2 SERPL-SCNC: 32 MMOL/L (ref 21–32)
CREAT SERPL-MCNC: 0.7 MG/DL (ref 0.6–1.3)
EOSINOPHIL # BLD AUTO: 0 X10(3)/MCL (ref 0–0.9)
EOSINOPHIL NFR BLD AUTO: 1 %
ERYTHROCYTE [DISTWIDTH] IN BLOOD BY AUTOMATED COUNT: 14.4 % (ref 11.5–14.5)
GLOBULIN SER-MCNC: 3.9 GM/ML (ref 2.3–3.5)
GLUCOSE SERPL-MCNC: 93 MG/DL (ref 74–106)
HCT VFR BLD AUTO: 33.3 % (ref 35–46)
HGB BLD-MCNC: 11 GM/DL (ref 12–16)
IMM GRANULOCYTES # BLD AUTO: 0.03 10*3/UL
IMM GRANULOCYTES NFR BLD AUTO: 1 %
LYMPHOCYTES # BLD AUTO: 1.2 X10(3)/MCL (ref 0.6–4.6)
LYMPHOCYTES NFR BLD AUTO: 29 %
MCH RBC QN AUTO: 34.1 PG (ref 26–34)
MCHC RBC AUTO-ENTMCNC: 33 GM/DL (ref 31–37)
MCV RBC AUTO: 103.1 FL (ref 80–100)
MONOCYTES # BLD AUTO: 0.2 X10(3)/MCL (ref 0.1–1.3)
MONOCYTES NFR BLD AUTO: 5 %
NEUTROPHILS # BLD AUTO: 2.51 X10(3)/MCL (ref 2.1–9.2)
NEUTROPHILS NFR BLD AUTO: 64 %
PLATELET # BLD AUTO: 303 X10(3)/MCL (ref 130–400)
PMV BLD AUTO: 9.6 FL (ref 7.4–10.4)
POTASSIUM SERPL-SCNC: 3.3 MMOL/L (ref 3.5–5.1)
PROT SERPL-MCNC: 7.2 GM/DL (ref 6.4–8.2)
RBC # BLD AUTO: 3.23 X10(6)/MCL (ref 4–5.2)
SODIUM SERPL-SCNC: 141 MMOL/L (ref 136–145)
WBC # SPEC AUTO: 3.9 X10(3)/MCL (ref 4.5–11)

## 2020-03-20 ENCOUNTER — HISTORICAL (OUTPATIENT)
Dept: INFUSION THERAPY | Facility: HOSPITAL | Age: 45
End: 2020-03-20

## 2020-03-26 ENCOUNTER — HISTORICAL (OUTPATIENT)
Dept: INFUSION THERAPY | Facility: HOSPITAL | Age: 45
End: 2020-03-26

## 2020-03-26 LAB
ABS NEUT (OLG): 4.27 X10(3)/MCL (ref 2.1–9.2)
ALBUMIN SERPL-MCNC: 3.5 GM/DL (ref 3.4–5)
ALBUMIN/GLOB SERPL: 0.9 RATIO (ref 1.1–2)
ALP SERPL-CCNC: 84 UNIT/L (ref 45–117)
ALT SERPL-CCNC: 35 UNIT/L (ref 12–78)
ANISOCYTOSIS BLD QL SMEAR: NORMAL
AST SERPL-CCNC: 17 UNIT/L (ref 15–37)
BASOPHILS # BLD AUTO: 0 X10(3)/MCL (ref 0–0.2)
BASOPHILS NFR BLD AUTO: 0 %
BILIRUB SERPL-MCNC: 0.3 MG/DL (ref 0.2–1)
BILIRUBIN DIRECT+TOT PNL SERPL-MCNC: 0.1 MG/DL (ref 0–0.2)
BILIRUBIN DIRECT+TOT PNL SERPL-MCNC: 0.2 MG/DL
BUN SERPL-MCNC: 12 MG/DL (ref 7–18)
CALCIUM SERPL-MCNC: 9.4 MG/DL (ref 8.5–10.1)
CHLORIDE SERPL-SCNC: 109 MMOL/L (ref 98–107)
CO2 SERPL-SCNC: 27 MMOL/L (ref 21–32)
CREAT SERPL-MCNC: 0.7 MG/DL (ref 0.6–1.3)
ERYTHROCYTE [DISTWIDTH] IN BLOOD BY AUTOMATED COUNT: 14.3 % (ref 11.5–14.5)
GLOBULIN SER-MCNC: 4.1 GM/ML (ref 2.3–3.5)
GLUCOSE SERPL-MCNC: 172 MG/DL (ref 74–106)
HCT VFR BLD AUTO: 34.1 % (ref 35–46)
HGB BLD-MCNC: 11.2 GM/DL (ref 12–16)
IMM GRANULOCYTES # BLD AUTO: 0.04 10*3/UL
IMM GRANULOCYTES NFR BLD AUTO: 1 %
LYMPHOCYTES # BLD AUTO: 0.5 X10(3)/MCL (ref 0.6–4.6)
LYMPHOCYTES NFR BLD AUTO: 10 %
MCH RBC QN AUTO: 34.3 PG (ref 26–34)
MCHC RBC AUTO-ENTMCNC: 32.8 GM/DL (ref 31–37)
MCV RBC AUTO: 104.3 FL (ref 80–100)
MONOCYTES # BLD AUTO: 0 X10(3)/MCL (ref 0.1–1.3)
MONOCYTES NFR BLD AUTO: 1 %
NEUTROPHILS # BLD AUTO: 4.27 X10(3)/MCL (ref 2.1–9.2)
NEUTROPHILS NFR BLD AUTO: 88 %
PLATELET # BLD AUTO: 354 X10(3)/MCL (ref 130–400)
PLATELET # BLD EST: ADEQUATE 10*3/UL
PMV BLD AUTO: 9.9 FL (ref 7.4–10.4)
POTASSIUM SERPL-SCNC: 3.8 MMOL/L (ref 3.5–5.1)
PROT SERPL-MCNC: 7.6 GM/DL (ref 6.4–8.2)
RBC # BLD AUTO: 3.27 X10(6)/MCL (ref 4–5.2)
RBC MORPH BLD: NORMAL
SODIUM SERPL-SCNC: 139 MMOL/L (ref 136–145)
WBC # SPEC AUTO: 4.8 X10(3)/MCL (ref 4.5–11)

## 2020-03-31 ENCOUNTER — HISTORICAL (OUTPATIENT)
Dept: RADIOLOGY | Facility: HOSPITAL | Age: 45
End: 2020-03-31

## 2020-04-01 ENCOUNTER — HISTORICAL (OUTPATIENT)
Dept: ADMINISTRATIVE | Facility: HOSPITAL | Age: 45
End: 2020-04-01

## 2020-04-01 LAB
ABS NEUT (OLG): 1.89 X10(3)/MCL (ref 2.1–9.2)
ALBUMIN SERPL-MCNC: 3.2 GM/DL (ref 3.4–5)
ALBUMIN/GLOB SERPL: 0.9 RATIO (ref 1.1–2)
ALP SERPL-CCNC: 83 UNIT/L (ref 45–117)
ALT SERPL-CCNC: 32 UNIT/L (ref 12–78)
AST SERPL-CCNC: 17 UNIT/L (ref 15–37)
BASOPHILS # BLD AUTO: 0 X10(3)/MCL (ref 0–0.2)
BASOPHILS NFR BLD AUTO: 1 %
BILIRUB SERPL-MCNC: 0.2 MG/DL (ref 0.2–1)
BILIRUBIN DIRECT+TOT PNL SERPL-MCNC: <0.1 MG/DL (ref 0–0.2)
BILIRUBIN DIRECT+TOT PNL SERPL-MCNC: ABNORMAL MG/DL
BUN SERPL-MCNC: 11 MG/DL (ref 7–18)
CALCIUM SERPL-MCNC: 8.8 MG/DL (ref 8.5–10.1)
CHLORIDE SERPL-SCNC: 108 MMOL/L (ref 98–107)
CO2 SERPL-SCNC: 27 MMOL/L (ref 21–32)
CREAT SERPL-MCNC: 0.8 MG/DL (ref 0.6–1.3)
EOSINOPHIL # BLD AUTO: 0 X10(3)/MCL (ref 0–0.9)
EOSINOPHIL NFR BLD AUTO: 1 %
ERYTHROCYTE [DISTWIDTH] IN BLOOD BY AUTOMATED COUNT: 14 % (ref 11.5–14.5)
GLOBULIN SER-MCNC: 3.6 GM/ML (ref 2.3–3.5)
GLUCOSE SERPL-MCNC: 160 MG/DL (ref 74–106)
HCT VFR BLD AUTO: 33.6 % (ref 35–46)
HGB BLD-MCNC: 10.9 GM/DL (ref 12–16)
IMM GRANULOCYTES # BLD AUTO: 0.04 10*3/UL
IMM GRANULOCYTES NFR BLD AUTO: 1 %
LYMPHOCYTES # BLD AUTO: 1.1 X10(3)/MCL (ref 0.6–4.6)
LYMPHOCYTES NFR BLD AUTO: 34 %
MCH RBC QN AUTO: 34.4 PG (ref 26–34)
MCHC RBC AUTO-ENTMCNC: 32.4 GM/DL (ref 31–37)
MCV RBC AUTO: 106 FL (ref 80–100)
MONOCYTES # BLD AUTO: 0.2 X10(3)/MCL (ref 0.1–1.3)
MONOCYTES NFR BLD AUTO: 7 %
NEUTROPHILS # BLD AUTO: 1.89 X10(3)/MCL (ref 2.1–9.2)
NEUTROPHILS NFR BLD AUTO: 57 %
PLATELET # BLD AUTO: 308 X10(3)/MCL (ref 130–400)
PMV BLD AUTO: 9.8 FL (ref 7.4–10.4)
POTASSIUM SERPL-SCNC: 3.7 MMOL/L (ref 3.5–5.1)
PROT SERPL-MCNC: 6.8 GM/DL (ref 6.4–8.2)
RBC # BLD AUTO: 3.17 X10(6)/MCL (ref 4–5.2)
SODIUM SERPL-SCNC: 139 MMOL/L (ref 136–145)
WBC # SPEC AUTO: 3.3 X10(3)/MCL (ref 4.5–11)

## 2020-04-02 ENCOUNTER — HISTORICAL (OUTPATIENT)
Dept: INFUSION THERAPY | Facility: HOSPITAL | Age: 45
End: 2020-04-02

## 2020-04-09 ENCOUNTER — HISTORICAL (OUTPATIENT)
Dept: INFUSION THERAPY | Facility: HOSPITAL | Age: 45
End: 2020-04-09

## 2020-04-09 LAB
ABS NEUT (OLG): 4.99 X10(3)/MCL (ref 2.1–9.2)
ALBUMIN SERPL-MCNC: 3.3 GM/DL (ref 3.4–5)
ALBUMIN/GLOB SERPL: 0.8 RATIO (ref 1.1–2)
ALP SERPL-CCNC: 74 UNIT/L (ref 45–117)
ALT SERPL-CCNC: 32 UNIT/L (ref 12–78)
AST SERPL-CCNC: 16 UNIT/L (ref 15–37)
BASOPHILS # BLD AUTO: 0 X10(3)/MCL (ref 0–0.2)
BASOPHILS NFR BLD AUTO: 0 %
BILIRUB SERPL-MCNC: 0.3 MG/DL (ref 0.2–1)
BILIRUBIN DIRECT+TOT PNL SERPL-MCNC: <0.1 MG/DL (ref 0–0.2)
BILIRUBIN DIRECT+TOT PNL SERPL-MCNC: ABNORMAL MG/DL
BUN SERPL-MCNC: 11 MG/DL (ref 7–18)
CALCIUM SERPL-MCNC: 9.2 MG/DL (ref 8.5–10.1)
CHLORIDE SERPL-SCNC: 106 MMOL/L (ref 98–107)
CO2 SERPL-SCNC: 25 MMOL/L (ref 21–32)
CREAT SERPL-MCNC: 0.9 MG/DL (ref 0.6–1.3)
EOSINOPHIL # BLD AUTO: 0 X10(3)/MCL (ref 0–0.9)
EOSINOPHIL NFR BLD AUTO: 0 %
ERYTHROCYTE [DISTWIDTH] IN BLOOD BY AUTOMATED COUNT: 13.7 % (ref 11.5–14.5)
GLOBULIN SER-MCNC: 4.2 GM/ML (ref 2.3–3.5)
GLUCOSE SERPL-MCNC: 218 MG/DL (ref 74–106)
HCT VFR BLD AUTO: 33.8 % (ref 35–46)
HGB BLD-MCNC: 11.2 GM/DL (ref 12–16)
IMM GRANULOCYTES # BLD AUTO: 0.29 10*3/UL
IMM GRANULOCYTES NFR BLD AUTO: 5 %
LYMPHOCYTES # BLD AUTO: 0.9 X10(3)/MCL (ref 0.6–4.6)
LYMPHOCYTES NFR BLD AUTO: 14 %
MCH RBC QN AUTO: 33.9 PG (ref 26–34)
MCHC RBC AUTO-ENTMCNC: 33.1 GM/DL (ref 31–37)
MCV RBC AUTO: 102.4 FL (ref 80–100)
MONOCYTES # BLD AUTO: 0.1 X10(3)/MCL (ref 0.1–1.3)
MONOCYTES NFR BLD AUTO: 2 %
NEUTROPHILS # BLD AUTO: 4.99 X10(3)/MCL (ref 2.1–9.2)
NEUTROPHILS NFR BLD AUTO: 79 %
PLATELET # BLD AUTO: 327 X10(3)/MCL (ref 130–400)
PMV BLD AUTO: 9.3 FL (ref 7.4–10.4)
POTASSIUM SERPL-SCNC: 3.9 MMOL/L (ref 3.5–5.1)
PROT SERPL-MCNC: 7.5 GM/DL (ref 6.4–8.2)
RBC # BLD AUTO: 3.3 X10(6)/MCL (ref 4–5.2)
SODIUM SERPL-SCNC: 139 MMOL/L (ref 136–145)
WBC # SPEC AUTO: 6.3 X10(3)/MCL (ref 4.5–11)

## 2020-04-15 ENCOUNTER — HISTORICAL (OUTPATIENT)
Dept: ADMINISTRATIVE | Facility: HOSPITAL | Age: 45
End: 2020-04-15

## 2020-04-15 LAB
ABS NEUT (OLG): 3.12 X10(3)/MCL (ref 2.1–9.2)
ALBUMIN SERPL-MCNC: 3 GM/DL (ref 3.4–5)
ALBUMIN/GLOB SERPL: 0.8 RATIO (ref 1.1–2)
ALP SERPL-CCNC: 86 UNIT/L (ref 45–117)
ALT SERPL-CCNC: 29 UNIT/L (ref 12–78)
AST SERPL-CCNC: 21 UNIT/L (ref 15–37)
BASOPHILS # BLD AUTO: 0 X10(3)/MCL (ref 0–0.2)
BASOPHILS NFR BLD AUTO: 1 %
BILIRUB SERPL-MCNC: 0.2 MG/DL (ref 0.2–1)
BILIRUBIN DIRECT+TOT PNL SERPL-MCNC: <0.1 MG/DL (ref 0–0.2)
BILIRUBIN DIRECT+TOT PNL SERPL-MCNC: ABNORMAL MG/DL
BUN SERPL-MCNC: 7 MG/DL (ref 7–18)
CALCIUM SERPL-MCNC: 8.3 MG/DL (ref 8.5–10.1)
CHLORIDE SERPL-SCNC: 107 MMOL/L (ref 98–107)
CO2 SERPL-SCNC: 29 MMOL/L (ref 21–32)
CREAT SERPL-MCNC: 0.7 MG/DL (ref 0.6–1.3)
EOSINOPHIL # BLD AUTO: 0 X10(3)/MCL (ref 0–0.9)
EOSINOPHIL NFR BLD AUTO: 1 %
ERYTHROCYTE [DISTWIDTH] IN BLOOD BY AUTOMATED COUNT: 13.7 % (ref 11.5–14.5)
GLOBULIN SER-MCNC: 3.6 GM/ML (ref 2.3–3.5)
GLUCOSE SERPL-MCNC: 125 MG/DL (ref 74–106)
HCT VFR BLD AUTO: 34 % (ref 35–46)
HGB BLD-MCNC: 11 GM/DL (ref 12–16)
IMM GRANULOCYTES # BLD AUTO: 0.09 10*3/UL
IMM GRANULOCYTES NFR BLD AUTO: 2 %
LYMPHOCYTES # BLD AUTO: 1.6 X10(3)/MCL (ref 0.6–4.6)
LYMPHOCYTES NFR BLD AUTO: 30 %
MAGNESIUM SERPL-MCNC: 1.8 MG/DL (ref 1.6–2.6)
MCH RBC QN AUTO: 33.7 PG (ref 26–34)
MCHC RBC AUTO-ENTMCNC: 32.4 GM/DL (ref 31–37)
MCV RBC AUTO: 104.3 FL (ref 80–100)
MONOCYTES # BLD AUTO: 0.4 X10(3)/MCL (ref 0.1–1.3)
MONOCYTES NFR BLD AUTO: 8 %
NEUTROPHILS # BLD AUTO: 3.12 X10(3)/MCL (ref 2.1–9.2)
NEUTROPHILS NFR BLD AUTO: 59 %
PLATELET # BLD AUTO: 296 X10(3)/MCL (ref 130–400)
PMV BLD AUTO: 9.7 FL (ref 7.4–10.4)
POTASSIUM SERPL-SCNC: 3.2 MMOL/L (ref 3.5–5.1)
PROT SERPL-MCNC: 6.6 GM/DL (ref 6.4–8.2)
RBC # BLD AUTO: 3.26 X10(6)/MCL (ref 4–5.2)
SODIUM SERPL-SCNC: 140 MMOL/L (ref 136–145)
WBC # SPEC AUTO: 5.3 X10(3)/MCL (ref 4.5–11)

## 2020-04-16 ENCOUNTER — HISTORICAL (OUTPATIENT)
Dept: INFUSION THERAPY | Facility: HOSPITAL | Age: 45
End: 2020-04-16

## 2020-04-23 ENCOUNTER — HISTORICAL (OUTPATIENT)
Dept: INFUSION THERAPY | Facility: HOSPITAL | Age: 45
End: 2020-04-23

## 2020-04-23 LAB
ABS NEUT (OLG): 5.34 X10(3)/MCL (ref 2.1–9.2)
ALBUMIN SERPL-MCNC: 3.2 GM/DL (ref 3.4–5)
ALBUMIN/GLOB SERPL: 0.7 RATIO (ref 1.1–2)
ALP SERPL-CCNC: 86 UNIT/L (ref 45–117)
ALT SERPL-CCNC: 33 UNIT/L (ref 12–78)
ANISOCYTOSIS BLD QL SMEAR: ABNORMAL
AST SERPL-CCNC: 17 UNIT/L (ref 15–37)
BASOPHILS NFR BLD MANUAL: 0 %
BILIRUB SERPL-MCNC: 0.2 MG/DL (ref 0.2–1)
BILIRUBIN DIRECT+TOT PNL SERPL-MCNC: <0.1 MG/DL (ref 0–0.2)
BILIRUBIN DIRECT+TOT PNL SERPL-MCNC: >0.1 MG/DL
BUN SERPL-MCNC: 11 MG/DL (ref 7–18)
CALCIUM SERPL-MCNC: 9.4 MG/DL (ref 8.5–10.1)
CHLORIDE SERPL-SCNC: 107 MMOL/L (ref 98–107)
CO2 SERPL-SCNC: 25 MMOL/L (ref 21–32)
CREAT SERPL-MCNC: 0.8 MG/DL (ref 0.6–1.3)
EOSINOPHIL NFR BLD MANUAL: 0 %
ERYTHROCYTE [DISTWIDTH] IN BLOOD BY AUTOMATED COUNT: 13.7 % (ref 11.5–14.5)
GLOBULIN SER-MCNC: 4.3 GM/ML (ref 2.3–3.5)
GLUCOSE SERPL-MCNC: 211 MG/DL (ref 74–106)
GRANULOCYTES NFR BLD MANUAL: 81 % (ref 43–75)
HCT VFR BLD AUTO: 35.5 % (ref 35–46)
HGB BLD-MCNC: 11.7 GM/DL (ref 12–16)
HYPOCHROMIA BLD QL SMEAR: ABNORMAL
LYMPHOCYTES NFR BLD MANUAL: 16 % (ref 20.5–51.1)
MACROCYTES BLD QL SMEAR: ABNORMAL
MCH RBC QN AUTO: 33.8 PG (ref 26–34)
MCHC RBC AUTO-ENTMCNC: 33 GM/DL (ref 31–37)
MCV RBC AUTO: 102.6 FL (ref 80–100)
MICROCYTES BLD QL SMEAR: ABNORMAL
MONOCYTES NFR BLD MANUAL: 3 % (ref 2–9)
PLATELET # BLD AUTO: 312 X10(3)/MCL (ref 130–400)
PLATELET # BLD EST: ADEQUATE 10*3/UL
PMV BLD AUTO: 9.4 FL (ref 7.4–10.4)
POIKILOCYTOSIS BLD QL SMEAR: ABNORMAL
POTASSIUM SERPL-SCNC: 3.9 MMOL/L (ref 3.5–5.1)
PROT SERPL-MCNC: 7.5 GM/DL (ref 6.4–8.2)
RBC # BLD AUTO: 3.46 X10(6)/MCL (ref 4–5.2)
RBC MORPH BLD: ABNORMAL
SODIUM SERPL-SCNC: 140 MMOL/L (ref 136–145)
WBC # SPEC AUTO: 6.8 X10(3)/MCL (ref 4.5–11)

## 2020-04-30 ENCOUNTER — HISTORICAL (OUTPATIENT)
Dept: ADMINISTRATIVE | Facility: HOSPITAL | Age: 45
End: 2020-04-30

## 2020-04-30 LAB
ABS NEUT (OLG): 3.78 X10(3)/MCL (ref 2.1–9.2)
ALBUMIN SERPL-MCNC: 2.9 GM/DL (ref 3.4–5)
ALBUMIN/GLOB SERPL: 0.7 RATIO (ref 1.1–2)
ALP SERPL-CCNC: 93 UNIT/L (ref 45–117)
ALT SERPL-CCNC: 24 UNIT/L (ref 12–78)
AST SERPL-CCNC: 16 UNIT/L (ref 15–37)
BASOPHILS # BLD AUTO: 0 X10(3)/MCL (ref 0–0.2)
BASOPHILS NFR BLD AUTO: 0 %
BILIRUB SERPL-MCNC: 0.2 MG/DL (ref 0.2–1)
BILIRUBIN DIRECT+TOT PNL SERPL-MCNC: <0.1 MG/DL (ref 0–0.2)
BILIRUBIN DIRECT+TOT PNL SERPL-MCNC: >0.1 MG/DL
BUN SERPL-MCNC: 11 MG/DL (ref 7–18)
CALCIUM SERPL-MCNC: 8.8 MG/DL (ref 8.5–10.1)
CHLORIDE SERPL-SCNC: 105 MMOL/L (ref 98–107)
CO2 SERPL-SCNC: 28 MMOL/L (ref 21–32)
CREAT SERPL-MCNC: 0.7 MG/DL (ref 0.6–1.3)
EOSINOPHIL # BLD AUTO: 0 X10(3)/MCL (ref 0–0.9)
EOSINOPHIL NFR BLD AUTO: 1 %
ERYTHROCYTE [DISTWIDTH] IN BLOOD BY AUTOMATED COUNT: 14 % (ref 11.5–14.5)
GLOBULIN SER-MCNC: 3.9 GM/ML (ref 2.3–3.5)
GLUCOSE SERPL-MCNC: 109 MG/DL (ref 74–106)
HCT VFR BLD AUTO: 34.8 % (ref 35–46)
HGB BLD-MCNC: 11.3 GM/DL (ref 12–16)
IMM GRANULOCYTES # BLD AUTO: 0.3 10*3/UL
IMM GRANULOCYTES NFR BLD AUTO: 5 %
LYMPHOCYTES # BLD AUTO: 1.8 X10(3)/MCL (ref 0.6–4.6)
LYMPHOCYTES NFR BLD AUTO: 28 %
MCH RBC QN AUTO: 33.4 PG (ref 26–34)
MCHC RBC AUTO-ENTMCNC: 32.5 GM/DL (ref 31–37)
MCV RBC AUTO: 103 FL (ref 80–100)
MONOCYTES # BLD AUTO: 0.6 X10(3)/MCL (ref 0.1–1.3)
MONOCYTES NFR BLD AUTO: 9 %
NEUTROPHILS # BLD AUTO: 3.78 X10(3)/MCL (ref 2.1–9.2)
NEUTROPHILS NFR BLD AUTO: 58 %
PLATELET # BLD AUTO: 290 X10(3)/MCL (ref 130–400)
PMV BLD AUTO: 9.5 FL (ref 7.4–10.4)
POTASSIUM SERPL-SCNC: 3.6 MMOL/L (ref 3.5–5.1)
PROT SERPL-MCNC: 6.8 GM/DL (ref 6.4–8.2)
RBC # BLD AUTO: 3.38 X10(6)/MCL (ref 4–5.2)
SODIUM SERPL-SCNC: 140 MMOL/L (ref 136–145)
WBC # SPEC AUTO: 6.6 X10(3)/MCL (ref 4.5–11)

## 2020-05-22 ENCOUNTER — HISTORICAL (OUTPATIENT)
Dept: ADMINISTRATIVE | Facility: HOSPITAL | Age: 45
End: 2020-05-22

## 2020-05-27 ENCOUNTER — HISTORICAL (OUTPATIENT)
Dept: SURGERY | Facility: HOSPITAL | Age: 45
End: 2020-05-27

## 2020-06-17 ENCOUNTER — HISTORICAL (OUTPATIENT)
Dept: INFUSION THERAPY | Facility: HOSPITAL | Age: 45
End: 2020-06-17

## 2020-06-23 ENCOUNTER — HISTORICAL (OUTPATIENT)
Dept: HEMATOLOGY/ONCOLOGY | Facility: CLINIC | Age: 45
End: 2020-06-23

## 2020-06-23 LAB
ABS NEUT (OLG): 2.5 X10(3)/MCL (ref 2.1–9.2)
ALBUMIN SERPL-MCNC: 3.1 GM/DL (ref 3.4–5)
ALBUMIN/GLOB SERPL: 0.7 RATIO (ref 1.1–2)
ALP SERPL-CCNC: 88 UNIT/L (ref 45–117)
ALT SERPL-CCNC: 50 UNIT/L (ref 12–78)
AST SERPL-CCNC: 48 UNIT/L (ref 15–37)
BASOPHILS # BLD AUTO: 0 X10(3)/MCL (ref 0–0.2)
BASOPHILS NFR BLD AUTO: 0 %
BILIRUB SERPL-MCNC: 0.3 MG/DL (ref 0.2–1)
BILIRUBIN DIRECT+TOT PNL SERPL-MCNC: <0.1 MG/DL (ref 0–0.2)
BILIRUBIN DIRECT+TOT PNL SERPL-MCNC: ABNORMAL MG/DL
BUN SERPL-MCNC: 7 MG/DL (ref 7–18)
CALCIUM SERPL-MCNC: 8.7 MG/DL (ref 8.5–10.1)
CHLORIDE SERPL-SCNC: 108 MMOL/L (ref 98–107)
CO2 SERPL-SCNC: 29 MMOL/L (ref 21–32)
CREAT SERPL-MCNC: 0.7 MG/DL (ref 0.6–1.3)
EOSINOPHIL # BLD AUTO: 0.2 X10(3)/MCL (ref 0–0.9)
EOSINOPHIL NFR BLD AUTO: 4 %
ERYTHROCYTE [DISTWIDTH] IN BLOOD BY AUTOMATED COUNT: 13.6 % (ref 11.5–14.5)
GLOBULIN SER-MCNC: 4.3 GM/ML (ref 2.3–3.5)
GLUCOSE SERPL-MCNC: 136 MG/DL (ref 74–106)
HCT VFR BLD AUTO: 36.7 % (ref 35–46)
HGB BLD-MCNC: 11.9 GM/DL (ref 12–16)
IMM GRANULOCYTES # BLD AUTO: 0.02 10*3/UL
IMM GRANULOCYTES NFR BLD AUTO: 0 %
LYMPHOCYTES # BLD AUTO: 2.3 X10(3)/MCL (ref 0.6–4.6)
LYMPHOCYTES NFR BLD AUTO: 42 %
MAGNESIUM SERPL-MCNC: 1.8 MG/DL (ref 1.6–2.6)
MCH RBC QN AUTO: 30.8 PG (ref 26–34)
MCHC RBC AUTO-ENTMCNC: 32.4 GM/DL (ref 31–37)
MCV RBC AUTO: 95.1 FL (ref 80–100)
MONOCYTES # BLD AUTO: 0.5 X10(3)/MCL (ref 0.1–1.3)
MONOCYTES NFR BLD AUTO: 8 %
NEUTROPHILS # BLD AUTO: 2.5 X10(3)/MCL (ref 2.1–9.2)
NEUTROPHILS NFR BLD AUTO: 45 %
PLATELET # BLD AUTO: 240 X10(3)/MCL (ref 130–400)
PMV BLD AUTO: 10.1 FL (ref 7.4–10.4)
POTASSIUM SERPL-SCNC: 3.2 MMOL/L (ref 3.5–5.1)
PROT SERPL-MCNC: 7.4 GM/DL (ref 6.4–8.2)
RBC # BLD AUTO: 3.86 X10(6)/MCL (ref 4–5.2)
SODIUM SERPL-SCNC: 141 MMOL/L (ref 136–145)
WBC # SPEC AUTO: 5.6 X10(3)/MCL (ref 4.5–11)

## 2020-07-01 ENCOUNTER — HISTORICAL (OUTPATIENT)
Dept: HEMATOLOGY/ONCOLOGY | Facility: CLINIC | Age: 45
End: 2020-07-01

## 2020-07-01 LAB
ALBUMIN SERPL-MCNC: 3.2 GM/DL (ref 3.4–5)
ALBUMIN/GLOB SERPL: 0.7 RATIO (ref 1.1–2)
ALP SERPL-CCNC: 103 UNIT/L (ref 45–117)
ALT SERPL-CCNC: 30 UNIT/L (ref 12–78)
AST SERPL-CCNC: 18 UNIT/L (ref 15–37)
BILIRUB SERPL-MCNC: 0.2 MG/DL (ref 0.2–1)
BILIRUBIN DIRECT+TOT PNL SERPL-MCNC: <0.1 MG/DL (ref 0–0.2)
BILIRUBIN DIRECT+TOT PNL SERPL-MCNC: ABNORMAL MG/DL
BUN SERPL-MCNC: 10 MG/DL (ref 7–18)
CALCIUM SERPL-MCNC: 9.2 MG/DL (ref 8.5–10.1)
CHLORIDE SERPL-SCNC: 107 MMOL/L (ref 98–107)
CO2 SERPL-SCNC: 28 MMOL/L (ref 21–32)
CREAT SERPL-MCNC: 0.8 MG/DL (ref 0.6–1.3)
GLOBULIN SER-MCNC: 4.5 GM/ML (ref 2.3–3.5)
GLUCOSE SERPL-MCNC: 134 MG/DL (ref 74–106)
MAGNESIUM SERPL-MCNC: 2 MG/DL (ref 1.6–2.6)
POTASSIUM SERPL-SCNC: 3.5 MMOL/L (ref 3.5–5.1)
PROT SERPL-MCNC: 7.7 GM/DL (ref 6.4–8.2)
SODIUM SERPL-SCNC: 141 MMOL/L (ref 136–145)

## 2020-07-07 ENCOUNTER — HISTORICAL (OUTPATIENT)
Dept: RADIOLOGY | Facility: HOSPITAL | Age: 45
End: 2020-07-07

## 2020-07-28 ENCOUNTER — HISTORICAL (OUTPATIENT)
Dept: INFUSION THERAPY | Facility: HOSPITAL | Age: 45
End: 2020-07-28

## 2020-08-19 ENCOUNTER — HISTORICAL (OUTPATIENT)
Dept: HEMATOLOGY/ONCOLOGY | Facility: CLINIC | Age: 45
End: 2020-08-19

## 2020-08-19 LAB
ABS NEUT (OLG): 1.5 X10(3)/MCL (ref 2.1–9.2)
ALBUMIN SERPL-MCNC: 3.4 GM/DL (ref 3.4–5)
ALBUMIN/GLOB SERPL: 0.7 RATIO (ref 1.1–2)
ALP SERPL-CCNC: 94 UNIT/L (ref 45–117)
ALT SERPL-CCNC: 46 UNIT/L (ref 12–78)
AST SERPL-CCNC: 42 UNIT/L (ref 15–37)
BILIRUB SERPL-MCNC: 0.4 MG/DL (ref 0.2–1)
BILIRUBIN DIRECT+TOT PNL SERPL-MCNC: 0.1 MG/DL (ref 0–0.2)
BILIRUBIN DIRECT+TOT PNL SERPL-MCNC: 0.3 MG/DL
BUN SERPL-MCNC: 9 MG/DL (ref 7–18)
CALCIUM SERPL-MCNC: 9.4 MG/DL (ref 8.5–10.1)
CHLORIDE SERPL-SCNC: 104 MMOL/L (ref 98–107)
CO2 SERPL-SCNC: 31 MMOL/L (ref 21–32)
CREAT SERPL-MCNC: 0.7 MG/DL (ref 0.6–1.3)
EOSINOPHIL NFR BLD MANUAL: 5 %
ERYTHROCYTE [DISTWIDTH] IN BLOOD BY AUTOMATED COUNT: 13.2 % (ref 11.5–14.5)
GLOBULIN SER-MCNC: 4.7 GM/ML (ref 2.3–3.5)
GLUCOSE SERPL-MCNC: 87 MG/DL (ref 74–106)
HCT VFR BLD AUTO: 40.6 % (ref 35–46)
HGB BLD-MCNC: 13 GM/DL (ref 12–16)
LYMPHOCYTES NFR BLD MANUAL: 37 % (ref 20.5–51.1)
MCH RBC QN AUTO: 29.5 PG (ref 26–34)
MCHC RBC AUTO-ENTMCNC: 32 GM/DL (ref 31–37)
MCV RBC AUTO: 92.3 FL (ref 80–100)
MONOCYTES NFR BLD MANUAL: 5 % (ref 2–9)
NEUTROPHILS NFR BLD MANUAL: 52 % (ref 47–80)
NEUTS BAND NFR BLD MANUAL: 1 % (ref 0–10)
PLATELET # BLD AUTO: 245 X10(3)/MCL (ref 130–400)
PLATELET # BLD EST: NORMAL 10*3/UL
PMV BLD AUTO: 9.8 FL (ref 7.4–10.4)
POTASSIUM SERPL-SCNC: 3.4 MMOL/L (ref 3.5–5.1)
PROT SERPL-MCNC: 8.1 GM/DL (ref 6.4–8.2)
RBC # BLD AUTO: 4.4 X10(6)/MCL (ref 4–5.2)
RBC MORPH BLD: NORMAL
SODIUM SERPL-SCNC: 140 MMOL/L (ref 136–145)
WBC # SPEC AUTO: 4 X10(3)/MCL (ref 4.5–11)

## 2020-08-25 ENCOUNTER — HISTORICAL (OUTPATIENT)
Dept: INFUSION THERAPY | Facility: HOSPITAL | Age: 45
End: 2020-08-25

## 2020-09-16 ENCOUNTER — HISTORICAL (OUTPATIENT)
Dept: HEMATOLOGY/ONCOLOGY | Facility: CLINIC | Age: 45
End: 2020-09-16

## 2020-09-16 LAB
ABS NEUT (OLG): 2.68 X10(3)/MCL (ref 2.1–9.2)
ALBUMIN SERPL-MCNC: 3.4 GM/DL (ref 3.4–5)
ALBUMIN/GLOB SERPL: 0.8 RATIO (ref 1.1–2)
ALP SERPL-CCNC: 109 UNIT/L (ref 45–117)
ALT SERPL-CCNC: 29 UNIT/L (ref 12–78)
AST SERPL-CCNC: 24 UNIT/L (ref 15–37)
BASOPHILS # BLD AUTO: 0 X10(3)/MCL (ref 0–0.2)
BASOPHILS NFR BLD AUTO: 0 %
BILIRUB SERPL-MCNC: 0.4 MG/DL (ref 0.2–1)
BILIRUBIN DIRECT+TOT PNL SERPL-MCNC: 0.1 MG/DL (ref 0–0.2)
BILIRUBIN DIRECT+TOT PNL SERPL-MCNC: 0.3 MG/DL
BUN SERPL-MCNC: 7 MG/DL (ref 7–18)
CALCIUM SERPL-MCNC: 9 MG/DL (ref 8.5–10.1)
CHLORIDE SERPL-SCNC: 106 MMOL/L (ref 98–107)
CO2 SERPL-SCNC: 30 MMOL/L (ref 21–32)
CREAT SERPL-MCNC: 0.8 MG/DL (ref 0.6–1.3)
EOSINOPHIL # BLD AUTO: 0.2 X10(3)/MCL (ref 0–0.9)
EOSINOPHIL NFR BLD AUTO: 3 %
ERYTHROCYTE [DISTWIDTH] IN BLOOD BY AUTOMATED COUNT: 13.5 % (ref 11.5–14.5)
GLOBULIN SER-MCNC: 4.5 GM/ML (ref 2.3–3.5)
GLUCOSE SERPL-MCNC: 105 MG/DL (ref 74–106)
HCT VFR BLD AUTO: 40.3 % (ref 35–46)
HGB BLD-MCNC: 13.1 GM/DL (ref 12–16)
IMM GRANULOCYTES # BLD AUTO: 0.04 10*3/UL
IMM GRANULOCYTES NFR BLD AUTO: 1 %
LYMPHOCYTES # BLD AUTO: 3.2 X10(3)/MCL (ref 0.6–4.6)
LYMPHOCYTES NFR BLD AUTO: 46 %
MCH RBC QN AUTO: 29.9 PG (ref 26–34)
MCHC RBC AUTO-ENTMCNC: 32.5 GM/DL (ref 31–37)
MCV RBC AUTO: 92 FL (ref 80–100)
MONOCYTES # BLD AUTO: 0.8 X10(3)/MCL (ref 0.1–1.3)
MONOCYTES NFR BLD AUTO: 11 %
NEUTROPHILS # BLD AUTO: 2.68 X10(3)/MCL (ref 2.1–9.2)
NEUTROPHILS NFR BLD AUTO: 39 %
PLATELET # BLD AUTO: 264 X10(3)/MCL (ref 130–400)
PMV BLD AUTO: 9.4 FL (ref 7.4–10.4)
POTASSIUM SERPL-SCNC: 3.5 MMOL/L (ref 3.5–5.1)
PROT SERPL-MCNC: 7.9 GM/DL (ref 6.4–8.2)
RBC # BLD AUTO: 4.38 X10(6)/MCL (ref 4–5.2)
SODIUM SERPL-SCNC: 139 MMOL/L (ref 136–145)
WBC # SPEC AUTO: 6.9 X10(3)/MCL (ref 4.5–11)

## 2020-09-22 ENCOUNTER — HISTORICAL (OUTPATIENT)
Dept: INFUSION THERAPY | Facility: HOSPITAL | Age: 45
End: 2020-09-22

## 2020-10-20 ENCOUNTER — HISTORICAL (OUTPATIENT)
Dept: INFUSION THERAPY | Facility: HOSPITAL | Age: 45
End: 2020-10-20

## 2020-11-17 ENCOUNTER — HISTORICAL (OUTPATIENT)
Dept: INFUSION THERAPY | Facility: HOSPITAL | Age: 45
End: 2020-11-17

## 2020-11-17 ENCOUNTER — HISTORICAL (OUTPATIENT)
Dept: RADIOLOGY | Facility: HOSPITAL | Age: 45
End: 2020-11-17

## 2020-12-15 ENCOUNTER — HISTORICAL (OUTPATIENT)
Dept: INFUSION THERAPY | Facility: HOSPITAL | Age: 45
End: 2020-12-15

## 2020-12-17 ENCOUNTER — HISTORICAL (OUTPATIENT)
Dept: HEMATOLOGY/ONCOLOGY | Facility: CLINIC | Age: 45
End: 2020-12-17

## 2020-12-17 LAB
ABS NEUT (OLG): 3.04 X10(3)/MCL (ref 2.1–9.2)
ALBUMIN SERPL-MCNC: 3.6 GM/DL (ref 3.5–5)
ALBUMIN/GLOB SERPL: 0.8 RATIO (ref 1.1–2)
ALP SERPL-CCNC: 99 UNIT/L (ref 40–150)
ALT SERPL-CCNC: 28 UNIT/L (ref 0–55)
AST SERPL-CCNC: 33 UNIT/L (ref 5–34)
BASOPHILS # BLD AUTO: 0 X10(3)/MCL (ref 0–0.2)
BASOPHILS NFR BLD AUTO: 0 %
BILIRUB SERPL-MCNC: 0.4 MG/DL
BILIRUBIN DIRECT+TOT PNL SERPL-MCNC: 0.2 MG/DL (ref 0–0.5)
BILIRUBIN DIRECT+TOT PNL SERPL-MCNC: 0.2 MG/DL (ref 0–0.8)
BUN SERPL-MCNC: 10 MG/DL (ref 7–18.7)
CALCIUM SERPL-MCNC: 9.7 MG/DL (ref 8.4–10.2)
CHLORIDE SERPL-SCNC: 106 MMOL/L (ref 98–107)
CO2 SERPL-SCNC: 30 MMOL/L (ref 22–29)
CREAT SERPL-MCNC: 0.77 MG/DL (ref 0.55–1.02)
EOSINOPHIL # BLD AUTO: 0.2 X10(3)/MCL (ref 0–0.9)
EOSINOPHIL NFR BLD AUTO: 3 %
ERYTHROCYTE [DISTWIDTH] IN BLOOD BY AUTOMATED COUNT: 12.9 % (ref 11.5–14.5)
GLOBULIN SER-MCNC: 4.5 GM/DL (ref 2.4–3.5)
GLUCOSE SERPL-MCNC: 108 MG/DL (ref 74–100)
HCT VFR BLD AUTO: 40.2 % (ref 35–46)
HGB BLD-MCNC: 13.2 GM/DL (ref 12–16)
IMM GRANULOCYTES # BLD AUTO: 0.03 10*3/UL
IMM GRANULOCYTES NFR BLD AUTO: 0 %
LYMPHOCYTES # BLD AUTO: 2.6 X10(3)/MCL (ref 0.6–4.6)
LYMPHOCYTES NFR BLD AUTO: 41 %
MCH RBC QN AUTO: 30.4 PG (ref 26–34)
MCHC RBC AUTO-ENTMCNC: 32.8 GM/DL (ref 31–37)
MCV RBC AUTO: 92.6 FL (ref 80–100)
MONOCYTES # BLD AUTO: 0.5 X10(3)/MCL (ref 0.1–1.3)
MONOCYTES NFR BLD AUTO: 8 %
NEUTROPHILS # BLD AUTO: 3.04 X10(3)/MCL (ref 2.1–9.2)
NEUTROPHILS NFR BLD AUTO: 47 %
PLATELET # BLD AUTO: 267 X10(3)/MCL (ref 130–400)
PMV BLD AUTO: 10 FL (ref 7.4–10.4)
POTASSIUM SERPL-SCNC: 3.6 MMOL/L (ref 3.5–5.1)
PROT SERPL-MCNC: 8.1 GM/DL (ref 6.4–8.3)
RBC # BLD AUTO: 4.34 X10(6)/MCL (ref 4–5.2)
SODIUM SERPL-SCNC: 143 MMOL/L (ref 136–145)
WBC # SPEC AUTO: 6.4 X10(3)/MCL (ref 4.5–11)

## 2021-01-12 ENCOUNTER — HISTORICAL (OUTPATIENT)
Dept: INFUSION THERAPY | Facility: HOSPITAL | Age: 46
End: 2021-01-12

## 2021-02-09 ENCOUNTER — HISTORICAL (OUTPATIENT)
Dept: INFUSION THERAPY | Facility: HOSPITAL | Age: 46
End: 2021-02-09

## 2021-03-09 ENCOUNTER — HISTORICAL (OUTPATIENT)
Dept: INFUSION THERAPY | Facility: HOSPITAL | Age: 46
End: 2021-03-09

## 2021-03-17 ENCOUNTER — HISTORICAL (OUTPATIENT)
Dept: HEMATOLOGY/ONCOLOGY | Facility: CLINIC | Age: 46
End: 2021-03-17

## 2021-03-17 LAB
ABS NEUT (OLG): 3.66 X10(3)/MCL (ref 2.1–9.2)
ALBUMIN SERPL-MCNC: 3.5 GM/DL (ref 3.5–5)
ALBUMIN/GLOB SERPL: 0.8 RATIO (ref 1.1–2)
ALP SERPL-CCNC: 125 UNIT/L (ref 40–150)
ALT SERPL-CCNC: 20 UNIT/L (ref 0–55)
AST SERPL-CCNC: 21 UNIT/L (ref 5–34)
BASOPHILS # BLD AUTO: 0 X10(3)/MCL (ref 0–0.2)
BASOPHILS NFR BLD AUTO: 0 %
BILIRUB SERPL-MCNC: 0.6 MG/DL
BILIRUBIN DIRECT+TOT PNL SERPL-MCNC: 0.2 MG/DL (ref 0–0.5)
BILIRUBIN DIRECT+TOT PNL SERPL-MCNC: 0.4 MG/DL (ref 0–0.8)
BUN SERPL-MCNC: 9.3 MG/DL (ref 7–18.7)
CALCIUM SERPL-MCNC: 9.4 MG/DL (ref 8.4–10.2)
CHLORIDE SERPL-SCNC: 102 MMOL/L (ref 98–107)
CO2 SERPL-SCNC: 31 MMOL/L (ref 22–29)
CREAT SERPL-MCNC: 0.79 MG/DL (ref 0.55–1.02)
EOSINOPHIL # BLD AUTO: 0.2 X10(3)/MCL (ref 0–0.9)
EOSINOPHIL NFR BLD AUTO: 3 %
ERYTHROCYTE [DISTWIDTH] IN BLOOD BY AUTOMATED COUNT: 12.7 % (ref 11.5–14.5)
GLOBULIN SER-MCNC: 4.4 GM/DL (ref 2.4–3.5)
GLUCOSE SERPL-MCNC: 107 MG/DL (ref 74–100)
HCT VFR BLD AUTO: 39.4 % (ref 35–46)
HGB BLD-MCNC: 13.2 GM/DL (ref 12–16)
IMM GRANULOCYTES # BLD AUTO: 0.07 10*3/UL
IMM GRANULOCYTES NFR BLD AUTO: 1 %
LYMPHOCYTES # BLD AUTO: 2 X10(3)/MCL (ref 0.6–4.6)
LYMPHOCYTES NFR BLD AUTO: 30 %
MCH RBC QN AUTO: 31.4 PG (ref 26–34)
MCHC RBC AUTO-ENTMCNC: 33.5 GM/DL (ref 31–37)
MCV RBC AUTO: 93.6 FL (ref 80–100)
MONOCYTES # BLD AUTO: 0.6 X10(3)/MCL (ref 0.1–1.3)
MONOCYTES NFR BLD AUTO: 9 %
NEUTROPHILS # BLD AUTO: 3.66 X10(3)/MCL (ref 2.1–9.2)
NEUTROPHILS NFR BLD AUTO: 56 %
PLATELET # BLD AUTO: 249 X10(3)/MCL (ref 130–400)
PMV BLD AUTO: 10.8 FL (ref 7.4–10.4)
POTASSIUM SERPL-SCNC: 3.2 MMOL/L (ref 3.5–5.1)
PROT SERPL-MCNC: 7.9 GM/DL (ref 6.4–8.3)
RBC # BLD AUTO: 4.21 X10(6)/MCL (ref 4–5.2)
SODIUM SERPL-SCNC: 142 MMOL/L (ref 136–145)
WBC # SPEC AUTO: 6.5 X10(3)/MCL (ref 4.5–11)

## 2021-04-06 ENCOUNTER — HISTORICAL (OUTPATIENT)
Dept: INFUSION THERAPY | Facility: HOSPITAL | Age: 46
End: 2021-04-06

## 2021-05-06 ENCOUNTER — HISTORICAL (OUTPATIENT)
Dept: INFUSION THERAPY | Facility: HOSPITAL | Age: 46
End: 2021-05-06

## 2021-06-03 ENCOUNTER — HISTORICAL (OUTPATIENT)
Dept: INFUSION THERAPY | Facility: HOSPITAL | Age: 46
End: 2021-06-03

## 2021-06-30 ENCOUNTER — HISTORICAL (OUTPATIENT)
Dept: INFECTIOUS DISEASES | Facility: HOSPITAL | Age: 46
End: 2021-06-30

## 2021-06-30 ENCOUNTER — HISTORICAL (OUTPATIENT)
Dept: ADMINISTRATIVE | Facility: HOSPITAL | Age: 46
End: 2021-06-30

## 2021-06-30 LAB — SARS-COV-2 RNA RESP QL NAA+PROBE: POSITIVE

## 2021-07-02 LAB — FINAL CULTURE: NORMAL

## 2021-07-20 ENCOUNTER — HISTORICAL (OUTPATIENT)
Dept: INFUSION THERAPY | Facility: HOSPITAL | Age: 46
End: 2021-07-20

## 2021-07-20 LAB
ABS NEUT (OLG): 4.64 X10(3)/MCL (ref 2.1–9.2)
ALBUMIN SERPL-MCNC: 3.3 GM/DL (ref 3.5–5)
ALBUMIN/GLOB SERPL: 0.9 RATIO (ref 1.1–2)
ALP SERPL-CCNC: 100 UNIT/L (ref 40–150)
ALT SERPL-CCNC: 13 UNIT/L (ref 0–55)
AST SERPL-CCNC: 15 UNIT/L (ref 5–34)
BASOPHILS # BLD AUTO: 0 X10(3)/MCL (ref 0–0.2)
BASOPHILS NFR BLD AUTO: 0 %
BILIRUB SERPL-MCNC: 0.5 MG/DL
BILIRUBIN DIRECT+TOT PNL SERPL-MCNC: 0.2 MG/DL (ref 0–0.5)
BILIRUBIN DIRECT+TOT PNL SERPL-MCNC: 0.3 MG/DL (ref 0–0.8)
BUN SERPL-MCNC: 9.9 MG/DL (ref 7–18.7)
CALCIUM SERPL-MCNC: 10 MG/DL (ref 8.4–10.2)
CHLORIDE SERPL-SCNC: 102 MMOL/L (ref 98–107)
CO2 SERPL-SCNC: 35 MMOL/L (ref 22–29)
CREAT SERPL-MCNC: 0.77 MG/DL (ref 0.55–1.02)
EOSINOPHIL # BLD AUTO: 0.2 X10(3)/MCL (ref 0–0.9)
EOSINOPHIL NFR BLD AUTO: 3 %
ERYTHROCYTE [DISTWIDTH] IN BLOOD BY AUTOMATED COUNT: 13.4 % (ref 11.5–14.5)
GLOBULIN SER-MCNC: 3.6 GM/DL (ref 2.4–3.5)
GLUCOSE SERPL-MCNC: 103 MG/DL (ref 74–100)
HCT VFR BLD AUTO: 29.7 % (ref 35–46)
HGB BLD-MCNC: 9.5 GM/DL (ref 12–16)
IMM GRANULOCYTES # BLD AUTO: 0.11 10*3/UL
IMM GRANULOCYTES NFR BLD AUTO: 1 %
LYMPHOCYTES # BLD AUTO: 3.1 X10(3)/MCL (ref 0.6–4.6)
LYMPHOCYTES NFR BLD AUTO: 36 %
MCH RBC QN AUTO: 30.4 PG (ref 26–34)
MCHC RBC AUTO-ENTMCNC: 32 GM/DL (ref 31–37)
MCV RBC AUTO: 95.2 FL (ref 80–100)
MONOCYTES # BLD AUTO: 0.6 X10(3)/MCL (ref 0.1–1.3)
MONOCYTES NFR BLD AUTO: 6 %
NEUTROPHILS # BLD AUTO: 4.64 X10(3)/MCL (ref 2.1–9.2)
NEUTROPHILS NFR BLD AUTO: 54 %
NRBC BLD AUTO-RTO: 0 % (ref 0–0.2)
PLATELET # BLD AUTO: 308 X10(3)/MCL (ref 130–400)
PMV BLD AUTO: 9.4 FL (ref 7.4–10.4)
POTASSIUM SERPL-SCNC: 3.5 MMOL/L (ref 3.5–5.1)
PROT SERPL-MCNC: 6.9 GM/DL (ref 6.4–8.3)
RBC # BLD AUTO: 3.12 X10(6)/MCL (ref 4–5.2)
SODIUM SERPL-SCNC: 142 MMOL/L (ref 136–145)
WBC # SPEC AUTO: 8.7 X10(3)/MCL (ref 4.5–11)

## 2021-08-17 ENCOUNTER — HISTORICAL (OUTPATIENT)
Dept: INFUSION THERAPY | Facility: HOSPITAL | Age: 46
End: 2021-08-17

## 2021-09-14 ENCOUNTER — HISTORICAL (OUTPATIENT)
Dept: INFUSION THERAPY | Facility: HOSPITAL | Age: 46
End: 2021-09-14

## 2021-09-20 ENCOUNTER — HISTORICAL (OUTPATIENT)
Dept: RADIOLOGY | Facility: HOSPITAL | Age: 46
End: 2021-09-20

## 2021-10-12 ENCOUNTER — HISTORICAL (OUTPATIENT)
Dept: INFUSION THERAPY | Facility: HOSPITAL | Age: 46
End: 2021-10-12

## 2021-10-12 ENCOUNTER — HISTORICAL (OUTPATIENT)
Dept: ADMINISTRATIVE | Facility: HOSPITAL | Age: 46
End: 2021-10-12

## 2021-10-12 LAB
APPEARANCE, UA: CLEAR
BACTERIA #/AREA URNS AUTO: ABNORMAL /HPF
BILIRUB UR QL STRIP: NEGATIVE
CHOLEST SERPL-MCNC: 180 MG/DL
CHOLEST/HDLC SERPL: 3 {RATIO} (ref 0–5)
COLOR UR: NORMAL
CREAT UR-MCNC: 242.1 MG/DL (ref 45–106)
DEPRECATED CALCIDIOL+CALCIFEROL SERPL-MC: 24.2 NG/ML (ref 30–80)
EST. AVERAGE GLUCOSE BLD GHB EST-MCNC: 116.9 MG/DL
GLUCOSE (UA): NEGATIVE
HAV IGM SERPL QL IA: NONREACTIVE
HBA1C MFR BLD: 5.7 %
HBV CORE IGM SERPL QL IA: NONREACTIVE
HBV SURFACE AG SERPL QL IA: NONREACTIVE
HCV AB SERPL QL IA: NONREACTIVE
HDLC SERPL-MCNC: 63 MG/DL (ref 35–60)
HGB UR QL STRIP: NEGATIVE
HIV 1+2 AB+HIV1 P24 AG SERPL QL IA: NONREACTIVE
HYALINE CASTS #/AREA URNS LPF: ABNORMAL /LPF
KETONES UR QL STRIP: NEGATIVE
LDLC SERPL CALC-MCNC: 100 MG/DL (ref 50–140)
LEUKOCYTE ESTERASE UR QL STRIP: NEGATIVE
NITRITE UR QL STRIP: NEGATIVE
PH UR STRIP: 6 [PH] (ref 4.5–8)
PROT UR QL STRIP: NEGATIVE
PROT UR STRIP-MCNC: 14.4 MG/DL
PROT/CREAT UR-RTO: 59.5 MG/GM CR
RBC #/AREA URNS AUTO: ABNORMAL /HPF
SP GR UR STRIP: 1.02 (ref 1–1.03)
SQUAMOUS #/AREA URNS LPF: ABNORMAL /LPF
TRIGL SERPL-MCNC: 84 MG/DL (ref 37–140)
TSH SERPL-ACNC: 2.49 UIU/ML (ref 0.35–4.94)
UROBILINOGEN UR STRIP-ACNC: NORMAL
VIT B12 SERPL-MCNC: 559 PG/ML (ref 213–816)
VLDLC SERPL CALC-MCNC: 17 MG/DL
WBC #/AREA URNS AUTO: ABNORMAL /HPF

## 2021-10-19 ENCOUNTER — HISTORICAL (OUTPATIENT)
Dept: ADMINISTRATIVE | Facility: HOSPITAL | Age: 46
End: 2021-10-19

## 2021-10-19 LAB
ABS NEUT (OLG): 1.96 X10(3)/MCL (ref 2.1–9.2)
ALBUMIN SERPL-MCNC: 3.3 GM/DL (ref 3.5–5)
ALBUMIN/GLOB SERPL: 0.8 RATIO (ref 1.1–2)
ALP SERPL-CCNC: 128 UNIT/L (ref 40–150)
ALT SERPL-CCNC: 16 UNIT/L (ref 0–55)
AST SERPL-CCNC: 20 UNIT/L (ref 5–34)
BASOPHILS # BLD AUTO: 0 X10(3)/MCL (ref 0–0.2)
BASOPHILS NFR BLD AUTO: 1 %
BILIRUB SERPL-MCNC: 0.2 MG/DL
BILIRUBIN DIRECT+TOT PNL SERPL-MCNC: 0.1 MG/DL (ref 0–0.5)
BILIRUBIN DIRECT+TOT PNL SERPL-MCNC: 0.1 MG/DL (ref 0–0.8)
BUN SERPL-MCNC: 8.6 MG/DL (ref 7–18.7)
CALCIUM SERPL-MCNC: 10.1 MG/DL (ref 8.4–10.2)
CHLORIDE SERPL-SCNC: 104 MMOL/L (ref 98–107)
CO2 SERPL-SCNC: 30 MMOL/L (ref 22–29)
CREAT SERPL-MCNC: 0.8 MG/DL (ref 0.55–1.02)
EOSINOPHIL # BLD AUTO: 0.3 X10(3)/MCL (ref 0–0.9)
EOSINOPHIL NFR BLD AUTO: 5 %
ERYTHROCYTE [DISTWIDTH] IN BLOOD BY AUTOMATED COUNT: 12.9 % (ref 11.5–14.5)
GLOBULIN SER-MCNC: 4.3 GM/DL (ref 2.4–3.5)
GLUCOSE SERPL-MCNC: 124 MG/DL (ref 74–100)
HCT VFR BLD AUTO: 37.8 % (ref 35–46)
HGB BLD-MCNC: 12.1 GM/DL (ref 12–16)
IMM GRANULOCYTES # BLD AUTO: 0.02 10*3/UL
IMM GRANULOCYTES NFR BLD AUTO: 0 %
LYMPHOCYTES # BLD AUTO: 2.8 X10(3)/MCL (ref 0.6–4.6)
LYMPHOCYTES NFR BLD AUTO: 51 %
MCH RBC QN AUTO: 29.6 PG (ref 26–34)
MCHC RBC AUTO-ENTMCNC: 32 GM/DL (ref 31–37)
MCV RBC AUTO: 92.4 FL (ref 80–100)
MONOCYTES # BLD AUTO: 0.4 X10(3)/MCL (ref 0.1–1.3)
MONOCYTES NFR BLD AUTO: 7 %
NEUTROPHILS # BLD AUTO: 1.96 X10(3)/MCL (ref 2.1–9.2)
NEUTROPHILS NFR BLD AUTO: 36 %
NRBC BLD AUTO-RTO: 0 % (ref 0–0.2)
PLATELET # BLD AUTO: 292 X10(3)/MCL (ref 130–400)
PMV BLD AUTO: 10.1 FL (ref 7.4–10.4)
POTASSIUM SERPL-SCNC: 3.4 MMOL/L (ref 3.5–5.1)
PROT SERPL-MCNC: 7.6 GM/DL (ref 6.4–8.3)
RBC # BLD AUTO: 4.09 X10(6)/MCL (ref 4–5.2)
SODIUM SERPL-SCNC: 142 MMOL/L (ref 136–145)
WBC # SPEC AUTO: 5.4 X10(3)/MCL (ref 4.5–11)

## 2021-11-09 ENCOUNTER — HISTORICAL (OUTPATIENT)
Dept: INFUSION THERAPY | Facility: HOSPITAL | Age: 46
End: 2021-11-09

## 2021-11-10 LAB
HIGH RISK HPV 16 (PRECISION): NEGATIVE
HIGH RISK HPV 18/45 (PRECISION): NEGATIVE
PAP RECOMMENDATION EXT: NORMAL
PAP SMEAR: NORMAL

## 2021-11-24 ENCOUNTER — HISTORICAL (OUTPATIENT)
Dept: RADIOLOGY | Facility: HOSPITAL | Age: 46
End: 2021-11-24

## 2021-11-24 LAB — CREAT SERPL-MCNC: 0.86 MG/DL (ref 0.55–1.02)

## 2021-12-07 ENCOUNTER — HISTORICAL (OUTPATIENT)
Dept: INFUSION THERAPY | Facility: HOSPITAL | Age: 46
End: 2021-12-07

## 2021-12-15 ENCOUNTER — HISTORICAL (OUTPATIENT)
Dept: RADIOLOGY | Facility: HOSPITAL | Age: 46
End: 2021-12-15

## 2022-01-05 ENCOUNTER — HISTORICAL (OUTPATIENT)
Dept: INFUSION THERAPY | Facility: HOSPITAL | Age: 47
End: 2022-01-05

## 2022-01-05 LAB
ABS NEUT (OLG): 2.91 X10(3)/MCL (ref 2.1–9.2)
ALBUMIN SERPL-MCNC: 3.7 GM/DL (ref 3.5–5)
ALBUMIN/GLOB SERPL: 0.9 RATIO (ref 1.1–2)
ALP SERPL-CCNC: 143 UNIT/L (ref 40–150)
ALT SERPL-CCNC: 31 UNIT/L (ref 0–55)
AST SERPL-CCNC: 26 UNIT/L (ref 5–34)
BASOPHILS # BLD AUTO: 0 X10(3)/MCL (ref 0–0.2)
BASOPHILS NFR BLD AUTO: 0 %
BILIRUB SERPL-MCNC: 0.6 MG/DL
BILIRUBIN DIRECT+TOT PNL SERPL-MCNC: 0.2 MG/DL (ref 0–0.5)
BILIRUBIN DIRECT+TOT PNL SERPL-MCNC: 0.4 MG/DL (ref 0–0.8)
BUN SERPL-MCNC: 8.1 MG/DL (ref 7–18.7)
CALCIUM SERPL-MCNC: 10.5 MG/DL (ref 8.7–10.5)
CANCER AG125 SERPL-ACNC: 8 UNIT/ML (ref 0–35)
CHLORIDE SERPL-SCNC: 105 MMOL/L (ref 98–107)
CO2 SERPL-SCNC: 32 MMOL/L (ref 22–29)
CREAT SERPL-MCNC: 0.78 MG/DL (ref 0.55–1.02)
EOSINOPHIL # BLD AUTO: 0.2 X10(3)/MCL (ref 0–0.9)
EOSINOPHIL NFR BLD AUTO: 2 %
ERYTHROCYTE [DISTWIDTH] IN BLOOD BY AUTOMATED COUNT: 13.8 % (ref 11.5–14.5)
GLOBULIN SER-MCNC: 4 GM/DL (ref 2.4–3.5)
GLUCOSE SERPL-MCNC: 139 MG/DL (ref 74–100)
HCT VFR BLD AUTO: 38.8 % (ref 35–46)
HGB BLD-MCNC: 12.9 GM/DL (ref 12–16)
IMM GRANULOCYTES # BLD AUTO: 0.03 10*3/UL
IMM GRANULOCYTES NFR BLD AUTO: 0 %
LYMPHOCYTES # BLD AUTO: 2.8 X10(3)/MCL (ref 0.6–4.6)
LYMPHOCYTES NFR BLD AUTO: 44 %
MCH RBC QN AUTO: 31.1 PG (ref 26–34)
MCHC RBC AUTO-ENTMCNC: 33.2 GM/DL (ref 31–37)
MCV RBC AUTO: 93.5 FL (ref 80–100)
MONOCYTES # BLD AUTO: 0.4 X10(3)/MCL (ref 0.1–1.3)
MONOCYTES NFR BLD AUTO: 7 %
NEUTROPHILS # BLD AUTO: 2.91 X10(3)/MCL (ref 2.1–9.2)
NEUTROPHILS NFR BLD AUTO: 46 %
NRBC BLD AUTO-RTO: 0 % (ref 0–0.2)
PLATELET # BLD AUTO: 275 X10(3)/MCL (ref 130–400)
PMV BLD AUTO: 10.2 FL (ref 7.4–10.4)
POTASSIUM SERPL-SCNC: 3.8 MMOL/L (ref 3.5–5.1)
PROT SERPL-MCNC: 7.7 GM/DL (ref 6.4–8.3)
RBC # BLD AUTO: 4.15 X10(6)/MCL (ref 4–5.2)
SODIUM SERPL-SCNC: 143 MMOL/L (ref 136–145)
WBC # SPEC AUTO: 6.3 X10(3)/MCL (ref 4.5–11)

## 2022-02-02 ENCOUNTER — HISTORICAL (OUTPATIENT)
Dept: INFUSION THERAPY | Facility: HOSPITAL | Age: 47
End: 2022-02-02

## 2022-02-02 LAB
ABS NEUT (OLG): 3.27 (ref 2.1–9.2)
ALBUMIN SERPL-MCNC: 3.6 G/DL (ref 3.5–5)
ALBUMIN/GLOB SERPL: 0.9 {RATIO} (ref 1.1–2)
ALP SERPL-CCNC: 168 U/L (ref 40–150)
ALT SERPL-CCNC: 16 U/L (ref 0–55)
AST SERPL-CCNC: 21 U/L (ref 5–34)
BASOPHILS # BLD AUTO: 0 10*3/UL (ref 0–0.2)
BASOPHILS NFR BLD AUTO: 0 %
BILIRUB SERPL-MCNC: 0.3 MG/DL
BILIRUBIN DIRECT+TOT PNL SERPL-MCNC: 0.1 (ref 0–0.5)
BILIRUBIN DIRECT+TOT PNL SERPL-MCNC: 0.2 (ref 0–0.8)
BUN SERPL-MCNC: 7.7 MG/DL (ref 7–18.7)
CALCIUM SERPL-MCNC: 9.8 MG/DL (ref 8.7–10.5)
CHLORIDE SERPL-SCNC: 107 MMOL/L (ref 98–107)
CO2 SERPL-SCNC: 27 MMOL/L (ref 22–29)
CREAT SERPL-MCNC: 0.76 MG/DL (ref 0.55–1.02)
EOSINOPHIL # BLD AUTO: 0.2 10*3/UL (ref 0–0.9)
EOSINOPHIL NFR BLD AUTO: 3 %
ERYTHROCYTE [DISTWIDTH] IN BLOOD BY AUTOMATED COUNT: 12.9 % (ref 11.5–14.5)
FLAG2 (OHS): 70
FLAG3 (OHS): 80
FLAGS (OHS): 70
GLOBULIN SER-MCNC: 4.1 G/DL (ref 2.4–3.5)
GLUCOSE SERPL-MCNC: 108 MG/DL (ref 74–100)
HCT VFR BLD AUTO: 37.8 % (ref 35–46)
HEMOLYSIS INTERF INDEX SERPL-ACNC: 15
HGB BLD-MCNC: 12.4 G/DL (ref 12–16)
ICTERIC INTERF INDEX SERPL-ACNC: 1
IMM GRANULOCYTES # BLD AUTO: 0.02 10*3/UL
IMM GRANULOCYTES NFR BLD AUTO: 0 %
IMM. NE 2 SUSPECT FLAG (OHS): 10
LIPEMIC INTERF INDEX SERPL-ACNC: 18
LOW EVENT # SUSPECT FLAG (OHS): 70
LYMPHOCYTES # BLD AUTO: 3.2 10*3/UL (ref 0.6–4.6)
LYMPHOCYTES NFR BLD AUTO: 44 %
MANUAL DIFF? (OHS): NO
MCH RBC QN AUTO: 31.1 PG (ref 26–34)
MCHC RBC AUTO-ENTMCNC: 32.8 G/DL (ref 31–37)
MCV RBC AUTO: 94.7 FL (ref 80–100)
MO BLASTS SUSPECT FLAG (OHS): 50
MONOCYTES # BLD AUTO: 0.6 10*3/UL (ref 0.1–1.3)
MONOCYTES NFR BLD AUTO: 8 %
NEUTROPHILS # BLD AUTO: 3.27 10*3/UL (ref 2.1–9.2)
NEUTROPHILS NFR BLD AUTO: 44 %
NRBC BLD AUTO-RTO: 0 % (ref 0–0.2)
PLATELET # BLD AUTO: 280 10*3/UL (ref 130–400)
PLATELET CLUMPS SUSPECT FLAG (OHS): 10
PMV BLD AUTO: 10.7 FL (ref 7.4–10.4)
POTASSIUM SERPL-SCNC: 3.7 MMOL/L (ref 3.5–5.1)
PROT SERPL-MCNC: 7.7 G/DL (ref 6.4–8.3)
RBC # BLD AUTO: 3.99 10*6/UL (ref 4–5.2)
SODIUM SERPL-SCNC: 144 MMOL/L (ref 136–145)
WBC # SPEC AUTO: 7.4 10*3/UL (ref 4.5–11)

## 2022-03-02 ENCOUNTER — HISTORICAL (OUTPATIENT)
Dept: INFUSION THERAPY | Facility: HOSPITAL | Age: 47
End: 2022-03-02

## 2022-03-16 ENCOUNTER — HISTORICAL (OUTPATIENT)
Dept: CARDIOLOGY | Facility: HOSPITAL | Age: 47
End: 2022-03-16

## 2022-03-18 ENCOUNTER — HISTORICAL (OUTPATIENT)
Dept: LAB | Facility: HOSPITAL | Age: 47
End: 2022-03-18

## 2022-03-18 LAB
ABS NEUT (OLG): 3.42 (ref 2.1–9.2)
BASOPHILS # BLD AUTO: 0 10*3/UL (ref 0–0.2)
BASOPHILS NFR BLD AUTO: 0 %
BUN SERPL-MCNC: 9.7 MG/DL (ref 7–18.7)
CALCIUM SERPL-MCNC: 10.4 MG/DL (ref 8.7–10.5)
CHLORIDE SERPL-SCNC: 104 MMOL/L (ref 98–107)
CO2 SERPL-SCNC: 31 MMOL/L (ref 22–29)
CREAT SERPL-MCNC: 0.82 MG/DL (ref 0.55–1.02)
CREAT/UREA NIT SERPL: 12
EOSINOPHIL # BLD AUTO: 0.2 10*3/UL (ref 0–0.9)
EOSINOPHIL NFR BLD AUTO: 2 %
ERYTHROCYTE [DISTWIDTH] IN BLOOD BY AUTOMATED COUNT: 12.2 % (ref 11.5–14.5)
FLAG2 (OHS): 70
FLAG3 (OHS): 80
FLAGS (OHS): 80
GLUCOSE SERPL-MCNC: 132 MG/DL (ref 74–100)
HCT VFR BLD AUTO: 39.2 % (ref 35–46)
HEMOLYSIS INTERF INDEX SERPL-ACNC: 1
HGB BLD-MCNC: 13.2 G/DL (ref 12–16)
ICTERIC INTERF INDEX SERPL-ACNC: 1
IMM GRANULOCYTES # BLD AUTO: 0.02 10*3/UL
IMM GRANULOCYTES NFR BLD AUTO: 0 %
IMM. NE 1 SUSPECT FLAG (OHS): 10
IMM. NE 2 SUSPECT FLAG (OHS): 10
LIPEMIC INTERF INDEX SERPL-ACNC: 18
LOW EVENT # SUSPECT FLAG (OHS): 80
LYMPHOCYTES # BLD AUTO: 3 10*3/UL (ref 0.6–4.6)
LYMPHOCYTES NFR BLD AUTO: 43 %
MANUAL DIFF? (OHS): NO
MCH RBC QN AUTO: 31.4 PG (ref 26–34)
MCHC RBC AUTO-ENTMCNC: 33.7 G/DL (ref 31–37)
MCV RBC AUTO: 93.1 FL (ref 80–100)
MO BLASTS SUSPECT FLAG (OHS): 40
MONOCYTES # BLD AUTO: 0.4 10*3/UL (ref 0.1–1.3)
MONOCYTES NFR BLD AUTO: 5 %
NEUTROPHILS # BLD AUTO: 3.42 10*3/UL (ref 2.1–9.2)
NEUTROPHILS NFR BLD AUTO: 49 %
NRBC BLD AUTO-RTO: 0 % (ref 0–0.2)
PLATELET # BLD AUTO: 275 10*3/UL (ref 130–400)
PLATELET CLUMPS SUSPECT FLAG (OHS): 20
PMV BLD AUTO: 10 FL (ref 7.4–10.4)
POTASSIUM SERPL-SCNC: 3.4 MMOL/L (ref 3.5–5.1)
RBC # BLD AUTO: 4.21 10*6/UL (ref 4–5.2)
SODIUM SERPL-SCNC: 142 MMOL/L (ref 136–145)
WBC # SPEC AUTO: 7 10*3/UL (ref 4.5–11)

## 2022-03-30 ENCOUNTER — HISTORICAL (OUTPATIENT)
Dept: INFUSION THERAPY | Facility: HOSPITAL | Age: 47
End: 2022-03-30

## 2022-04-05 DIAGNOSIS — C50.919 MALIGNANT NEOPLASM OF FEMALE BREAST, UNSPECIFIED ESTROGEN RECEPTOR STATUS, UNSPECIFIED LATERALITY, UNSPECIFIED SITE OF BREAST: ICD-10-CM

## 2022-04-11 ENCOUNTER — HISTORICAL (OUTPATIENT)
Dept: ADMINISTRATIVE | Facility: HOSPITAL | Age: 47
End: 2022-04-11
Payer: MEDICAID

## 2022-04-25 VITALS
OXYGEN SATURATION: 99 % | HEIGHT: 66 IN | SYSTOLIC BLOOD PRESSURE: 150 MMHG | WEIGHT: 201.94 LBS | BODY MASS INDEX: 32.45 KG/M2 | DIASTOLIC BLOOD PRESSURE: 90 MMHG

## 2022-04-27 ENCOUNTER — HISTORICAL (OUTPATIENT)
Dept: INFUSION THERAPY | Facility: HOSPITAL | Age: 47
End: 2022-04-27
Payer: MEDICAID

## 2022-04-27 LAB
ABS NEUT (OLG): 2.43 (ref 2.1–9.2)
ALBUMIN SERPL-MCNC: 3.6 G/DL (ref 3.5–5)
ALBUMIN/GLOB SERPL: 0.9 {RATIO} (ref 1.1–2)
ALP SERPL-CCNC: 157 U/L (ref 40–150)
ALT SERPL-CCNC: 15 U/L (ref 0–55)
AST SERPL-CCNC: 26 U/L (ref 5–34)
BASOPHILS # BLD AUTO: 0 10*3/UL (ref 0–0.2)
BASOPHILS NFR BLD AUTO: 1 %
BILIRUB SERPL-MCNC: 0.4 MG/DL
BILIRUBIN DIRECT+TOT PNL SERPL-MCNC: 0.2 (ref 0–0.5)
BILIRUBIN DIRECT+TOT PNL SERPL-MCNC: 0.2 (ref 0–0.8)
BUN SERPL-MCNC: 10.9 MG/DL (ref 7–18.7)
CALCIUM SERPL-MCNC: 9.7 MG/DL (ref 8.7–10.5)
CHLORIDE SERPL-SCNC: 103 MMOL/L (ref 98–107)
CHOLEST SERPL-MCNC: 192 MG/DL
CHOLEST/HDLC SERPL: 3 {RATIO} (ref 0–5)
CO2 SERPL-SCNC: 31 MMOL/L (ref 22–29)
CREAT SERPL-MCNC: 0.79 MG/DL (ref 0.55–1.02)
DEPRECATED CALCIDIOL+CALCIFEROL SERPL-MC: 40.4 NG/ML (ref 30–80)
EOSINOPHIL # BLD AUTO: 0.2 10*3/UL (ref 0–0.9)
EOSINOPHIL NFR BLD AUTO: 3 %
ERYTHROCYTE [DISTWIDTH] IN BLOOD BY AUTOMATED COUNT: 12.3 % (ref 11.5–14.5)
EST. AVERAGE GLUCOSE BLD GHB EST-MCNC: 116.9 MG/DL
FLAG2 (OHS): 70
FLAG3 (OHS): 80
FLAGS (OHS): 80
GLOBULIN SER-MCNC: 4.1 G/DL (ref 2.4–3.5)
GLUCOSE SERPL-MCNC: 103 MG/DL (ref 74–100)
HBA1C MFR BLD: 5.7 %
HCT VFR BLD AUTO: 37.7 % (ref 35–46)
HDLC SERPL-MCNC: 67 MG/DL (ref 35–60)
HEMOLYSIS INTERF INDEX SERPL-ACNC: 6
HGB BLD-MCNC: 12.4 G/DL (ref 12–16)
ICTERIC INTERF INDEX SERPL-ACNC: 0
IMM GRANULOCYTES # BLD AUTO: 0.03 10*3/UL
IMM GRANULOCYTES NFR BLD AUTO: 0 %
IMM. NE 2 SUSPECT FLAG (OHS): 10
LDLC SERPL CALC-MCNC: 98 MG/DL (ref 50–140)
LIPEMIC INTERF INDEX SERPL-ACNC: 21
LOW EVENT # SUSPECT FLAG (OHS): 80
LYMPHOCYTES # BLD AUTO: 3.3 10*3/UL (ref 0.6–4.6)
LYMPHOCYTES NFR BLD AUTO: 51 %
MANUAL DIFF? (OHS): NO
MCH RBC QN AUTO: 30.5 PG (ref 26–34)
MCHC RBC AUTO-ENTMCNC: 32.9 G/DL (ref 31–37)
MCV RBC AUTO: 92.6 FL (ref 80–100)
MO BLASTS SUSPECT FLAG (OHS): 70
MONOCYTES # BLD AUTO: 0.5 10*3/UL (ref 0.1–1.3)
MONOCYTES NFR BLD AUTO: 8 %
NEUTROPHILS # BLD AUTO: 2.43 10*3/UL (ref 2.1–9.2)
NEUTROPHILS NFR BLD AUTO: 37 %
NRBC BLD AUTO-RTO: 0 % (ref 0–0.2)
PLATELET # BLD AUTO: 267 10*3/UL (ref 130–400)
PMV BLD AUTO: 11.4 FL (ref 7.4–10.4)
POTASSIUM SERPL-SCNC: 3.4 MMOL/L (ref 3.5–5.1)
PROT SERPL-MCNC: 7.7 G/DL (ref 6.4–8.3)
RBC # BLD AUTO: 4.07 10*6/UL (ref 4–5.2)
SODIUM SERPL-SCNC: 141 MMOL/L (ref 136–145)
T4 SERPL-MCNC: 5.95 UG/DL (ref 4.87–11.72)
TRIGL SERPL-MCNC: 136 MG/DL (ref 37–140)
TSH SERPL-ACNC: 1.48 M[IU]/L (ref 0.35–4.94)
VLDLC SERPL CALC-MCNC: 27 MG/DL
WBC # SPEC AUTO: 6.5 10*3/UL (ref 4.5–11)

## 2022-05-05 NOTE — HISTORICAL [facility] CERNER
This is a historical note converted from Liz. Formatting and pictures may have been removed.  Please reference C[facility]ce for original formatting and attached multimedia. H&P UPDATE  ?  since last clinic visit states only change is that skin changes on her right breast are worse with more itching  states feeling well this morning, except a bit anxious for surgery  is npo since midnight, took her BP yesterday  didnt take any medications this morning, denies blood thinners  answered all questions regarding surgery  ?  right side marked for right SLNB vs ALND  ?  ?  OR for bilateral mastectomy and right SLNB vs right ALND  ?  Perla Harding MD  [facility]   I spoke with the patient regarding her wish for bilateral mastectomy. As of right now, she does not meet criteria for left prophylactic mastectomy. She is interested in reconstruction and the insurance companies do allow for symmetry procedures. So once all of her treatments for her right breast cancer is completed we can then get her to a plastic surgeon to discuss her options. She expressed understanding.  ?  Today, we will proceed with right simple mastectomy with sentinel lymph node biopsy possible axillary lymph node dissection.  ?  ?  ?  Stephanie Silva MD

## 2022-05-05 NOTE — HISTORICAL [facility] CERNER
This is a historical note converted from Liz. Formatting and pictures may have been removed.  Please reference Cermarce for original formatting and attached multimedia. History of Present Illness  Past medical history: Hypertension.? Obesity.? Tubal ligation?in 1999.  -Right breast at 6:00, FNA (05/26/2016): Cyst with apocrine metaplasia  ?   Social history:?Single. ?Lives in [place], [place].? Has 2 children.? She is an  at a nursing home.? Denies history of tobacco, alcohol, or illicit drug abuse.  ?   Family history:?Maternal grandmother?experienced breast cancer twice, in her 50s, and then in her 80s.? One maternal cousin?experienced uterine cancer in her 40s.? Another maternal cousin?experienced uterine cancer in her 50s.? Mothers maternal uncle experienced some kind of cancer in the 70s.  ?   Health maintenance:  ThinPrep cervical Pap smear (07/25/2016: Negative for intraepithelial lesion or malignancy.  Bilateral diagnostic mammogram 05/15/2017, was BI-RADS Category 2, benign.  ?   Menstrual and OB/GYN history:?Menarche?at age 12. ?First childbirth at age 18.? No history of abortions or miscarriages.? Used birth control pills many years back.? Currently, as of 11/11/2019,?regular and normal menstrual periods.  ?   History of present illness:  44-year-old female referred by Dr. Stephanie Silva for breast cancer.  ?   She reported a 3-month history of right breast pain and bloody nipple discharge.? She was seen at [facility] where she underwent a diagnostic mammogram and bilateral ultrasound which revealed masses bilaterally.? Biopsies were performed on 10/01/2019.? Right breast has multiple masses consistent with ductal carcinoma, ER positive, IN positive, HER-2 negative; right axillary lymph node pathology consistent with metastatic adenocarcinoma.? Left breast lesion was benign.  ?   Patient was discussed in tumor board where it was decided for patient to  undergo mastectomy, then adjuvant chemotherapy and radiation therapy.? Mastectomy is apparently scheduled for 12/04/2019.? Genetic counseling is being scheduled.? Bilateral breast MRI scan for further evaluation of both breasts, was recommended.  ?  09/24/2019: Bilateral diagnostic mammogram (bloody right breast discharge, right breast pain) and ultrasound of right breast:  Highly suspicious right breast calcifications and multiple masses (1.5 x 1.3 x 1.1 cm; 2.7 cm; 1.6 cm; 2.8 cm;; 2.9 cm); multicentric disease suspected with axillary lymph node metastasis (multiple abnormal appearing lymph nodes, largest 1.9 cm, with obliterated hilum and thickened cortex)  ?  10/01/2019: Ultrasound-guided biopsy:  1.? Breast, left, 2:00, 10 cm from nipple, core biopsies: No malignancy  2.? Breast, left, 10:00, 4 cm from nipple, core biopsies: No malignancy  3.? Breast, right, 7:00, 8 cm from nipple, core biopsies: Invasive ductal carcinoma, intermediate grade, measuring at least 1.2 cm; DCIS, clinging to solid pattern, high-grade, without necrosis and without microcalcifications  4.? Breast, right, 10:00, 9 cm from nipple, core biopsies: DCIS, clinging, micropapillary, solid, and cribriform patterns, high-grade, with necrosis and microcalcifications, supported by immunostains  5.? Right axillary lymph node, core biopsies: Positive for metastatic adenocarcinoma, consistent with breast primary  ?  ER positive (93.2%).? ME positive (90.3%).? HER-2 negative by IHC (0-1+) and FISH.? Ki-67 15.3% (borderline positive)  ?  ?  Interval history:  11/11/2019:  Presents for initial oncology consultation, accompanied by?her mother and daughter. ?Overall, feels quite well?except for some fatigue.? Has experienced some right upper extremity numbness, pain, and stinging sensation?for last 1 month as a result of this,?she is preferring?left arm?for some activities,?like typing, etc.? Appetite is good. ?No unintentional weight loss. ?No unusual  headaches, focal neuro symptoms, chest pain, cough, dyspnea, fevers, chills, abdominal pain, nausea, vomiting, GI bleeding, bone pains,?any urinary problems, etc.? ECOG 0.  ?  04/16/2020:  -10th cycle of weekly neoadjuvant Taxol on 04/09/2020  -03/31/2020: DEXA scan: Normal BMD  -No significant cytopenias with Taxol  Presents for follow-up visit. ?Overall, doing well. ?Experiences?diarrhea, x2-3 daily, watery,?for a couple of days after Taxol for which he takes Imodium, with good control.? No nausea or vomiting.? Peripheral neuropathy is well controlled with gabapentin and Cymbalta.? Some hot flashes.? Has not had menstrual cycles?since December.? Occasional, mild nosebleeds; she has not developed thrombocytopenia.? Occasional, fleeting pains?in the lower right breast and right axilla. ?No fevers or chills.? Excellent appetite.  ?  ?  Review of systems:  All systems reviewed, and found to be negative except for the symptoms detailed above.  ?  ?  Physical examination:  VITAL SIGNS:? Reviewed.? ?  GENERAL:? In no apparent distress.?  HEAD:? No signs of head trauma.  EYES:? Pupils are equal.? Extraocular motions intact.?  EARS:? Hearing grossly intact.  MOUTH:? Oropharynx is normal.  NECK:? No adenopathy, no JVD.? ?  CHEST:? Chest with clear breath sounds bilaterally.? No wheezes, rales, or rhonchi.?  CARDIAC:? Regular rate and rhythm.? S1 and S2, without murmurs, gallops, or rubs.  VASCULAR:? No Edema.? Peripheral pulses normal and equal in all extremities.  ABDOMEN:? Soft, without detectable tenderness.? No sign of distention.? No? ?rebound or guarding, and no masses palpated.? ?Bowel Sounds normal.  MUSCULOSKELETAL:? Good range of motion of all major joints. Extremities without clubbing, cyanosis or edema.?  NEUROLOGIC EXAM:? Alert and oriented x 3.? No focal sensory or strength deficits.? ?Speech normal.? Follows commands.  PSYCHIATRIC:? Mood normal.  SKIN:? No rash or lesions.  11/11/2019:?Bilateral breast  examination was performed with her verbal consent, in the presence of her mother and daughter,?and the presence of Prenella, LPN;?right breast is large, with orange peel appearance?(erythema, erythematous?and dimpled skin)?involving?<1/3 of the skin?in the periareolar, 6:00, 3:00, and 9:00?positions;?no nipple discharge;?no palpable regional lymphadenopathy; left breast free from any suspicious lesions/masses or regional lymphadenopathy.? No palpable?hepatomegaly. ?Lungs clear to auscultation.  11/25/2019:?Breast examination performed with her verbal consent, in the presence of Prenella, LPN;?right breast is diffusely enlarged,?approximately 13 cm?x 12 cm?enlargement in the lower quadrants;?orange peel appearance of skin?is noted along with some erythema, involving 3 oclock position, 6 oclock position, 9 oclock position, and periareolar area, definitely?approximately?50%?of the skin of the breast.  02/05/2020:?Right breast examined with her verbal consent, in the presence of nursing staff, and with her verbal consent:?Right breast?is much smaller and much softer?without discretely palpable lump; no skin?retraction, skin ulceration, fixation,?erythema,?or orange peel appearance; no nipple retraction; no palpable lymphadenopathy in axillary, infraclavicular, supraclavicular, or cervical areas.  03/18/2020:?Right breast examined?with her verbal consent, in the presence of? Prenella, LPN;?right breast?is completely soft, without discrete masses, but still with?some orange peel appearance?of the skin in the lower half but without erythema;?no palpable regional lymphadenopathy.  04/16/2020: Breast examination performed with her verbal consent,?in the presence of Prenella, LPN;?right breast is completely soft, without discrete masses; a hint of orange peel appearance?of the skin in the lower half of the breast, with a slight hint of erythema;?no ulceration or?satellite nodules; no fixation to chest wall;?no palpable  lymphadenopathy in axillary, supraclavicular,?or infraclavicular areas.  ?  ?  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 SC positive (90.3%).? HER-2 negative ([facility], FISH testing).? 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  #Taxol-induced peripheral neuropathy  -Gabapentin started 02/19/2020  -Cymbalta 60 mg p.o. nightly started?03/18/2020  ---  ?  #Premenopausal as of 11/11/2019 by history  -No menstrual cycles since December 2019?(after starting neoadjuvant chemotherapy)  ----  ?  # Family history of breast cancer and uterine cancers  -12/02/2019: Genetic testing:?Three (3) variants of uncertain significance (VUS): AXIN2 c.1573C>G (p.Npv463Rym), msh3 c.2005C>T (p.Qqj048Pic), and POLE c.1007A>G (p.Ycv866Vgq)  ----  ?  #Exophytic fibroid versus left adnexal mass (4 cm, solid) on CT 11/22/2019 12/03/2019: Pelvic ultrasound: No discrete sonographic pathology  -----  ?  ?  Plan:  -Continue weekly Taxol for a total of 12 cycles?(so far, has received?10  cycles)  -CBC and CMP weekly  -Continue gabapentin for neuropathy?(on 300 mg?3 times a day but taking only once at night)  -Added Cymbalta 60 mg p.o. nightly?(03/18/2020)  -Post neoadjuvant chemotherapy, will need mastectomy and adjuvant radiation therapy?and adjuvant endocrine therapy?(ovarian suppression plus AI)  ?  Follow-up visit in 2 weeks for clinical assessment,?before referral to surgery for mastectomy;?to see nurse practitioner.  ?  Above discussed with her. ?All questions answered. ?Went over labs and gave her copies for her record. ?She understands and agrees with this plan.  ----  ?  By 11/25/2019,?the right breast cancer had become inflammatory breast carcinoma.  Has completed 4 cycles of neoadjuvant DD AC, clinically,?with good response.  Neoadjuvant weekly Taxol started 02/07/2020  Gabapentin for Taxol induced neuropathy?started?02/19/2020  ?  Weekly Taxol?schedule:  80 mg/m? IV weekly x12?cycles  ?  Check CBC and CMP weekly during chemotherapy.  ?  After neoadjuvant chemotherapy,?will need mastectomy?and then adjuvant radiation therapy?and adjuvant endocrine therapy.  ?  -Since she has aggressive, node positive disease,?therefore,?for adjuvant endocrine therapy,?she will benefit from?ovarian suppression?plus aromatase inhibitor?therapy.  ?  Baseline DEXA scan (03/21/2020, with normal BMD  ?  Recommendation:?Counseling for fertility concerns?if premenopausal; pregnancy test in all women of childbearing potential.  She is already status post tubal ligation.  Physical Exam  Vitals & Measurements  T:?36.9? ?C (Oral)? HR:?115(Peripheral)? RR:?18? BP:?131/89? SpO2:?99%?  HT:?170?cm? WT:?98.4?kg? BMI:?34.05? LMP:?12/12/2019 00:00 CST?   Problem List/Past Medical History  Ongoing  Breast cancer of lower-inner quadrant of right female breast  Chemotherapy-induced peripheral neuropathy  Obesity  Historical  Breast cancer  Pregnant  Pregnant  Procedure/Surgical History  Catheter Insertion Mediport (None)  (11/27/2019)  Insertion of Totally Implantable Vascular Access Device into Chest Subcutaneous Tissue and Fascia, Open Approach (11/27/2019)  Insertion of tunneled centrally inserted central venous access device, with subcutaneous port; age 5 years or older (11/27/2019)  Mammogram (2019)  Biopsy of breast  [place]  Tubal ligation   Medications  acetaminophen-hydrocodone 325 mg-5 mg oral tablet, 1 tab(s), Oral, q6hr,? ?Not taking  Cymbalta 60 mg oral delayed release capsule, 60 mg= 1 cap(s), Oral, At Bedtime, 3 refills  dexamethasone (for IVPB)  dexamethasone (for IVPB), 10 mg= 1 mL, IV Piggyback, Once-chemo  dexamethasone (for IVPB)  dexamethasone (for IVPB)  dexamethasone (for IVPB)  erythromycin 0.5% ophthalmic ointment, 1 manpreet, Eye-Both, QID,? ?Not taking  Flonase 50 mcg/inh nasal spray, 2 spray(s), Nasal, BID  Fulphila 6 mg/0.6 mL subcutaneous solution, 6 mg, Subcutaneous, Once,? ?Not taking  gabapentin 300 mg oral capsule, 300 mg= 1 cap(s), Oral, TID, 1 refills  Heparin Flush 100 U/mL - 5 mL, 500 units= 5 mL, IV Push, Once-chemo  Heparin Flush 100 U/mL - 5 mL, 500 units= 5 mL, IV Push, Once-chemo  Heparin Flush 100 U/mL - 5 mL, 500 units= 5 mL, IV Push, Once-chemo  Heparin Flush 100 U/mL - 5 mL, 500 units= 5 mL, IV Push, Once-chemo  Heparin Flush 100 U/mL - 5 mL, 500 units= 5 mL, IV Push, Once-chemo  Heparin Flush 100 U/mL - 5 mL, 500 units= 5 mL, IV Push, Once-chemo  Heparin Flush 100 U/mL - 5 mL, 500 units= 5 mL, IV Push, Once-chemo  Heparin Flush 100 U/mL - 5 mL, 500 units= 5 mL, IV Push, Once-chemo  Heparin Flush 100 U/mL - 5 mL, 500 units= 5 mL, IV Push, Once-chemo  hydrochlorothiazide-lisinopril 12.5 mg-10 mg oral tablet, 1 tab(s), Oral, Daily, 4 refills  Magic Mouthwash Combination, See Instructions, 3 refills  megestrol 40 mg/mL oral suspension, 800 mg= 20 mL, Oral, Daily,? ?Not taking  Neulasta 6mg/0.6ml delivery kit, 6 mg= 0.6 mL, Subcutaneous, Once  Neulasta 6mg/0.6ml delivery kit, 6 mg= 0.6 mL,  Subcutaneous, Once  Neulasta 6mg/0.6ml delivery kit, 6 mg= 0.6 mL, Subcutaneous, Once  ondansetron 8 mg oral tablet, disintegrating, 8 mg= 1 tab(s), Oral, TID,? ?Not taking  Phenergan 12.5 mg Tab, 12.5 mg= 1 tab(s), Oral, q4hr, PRN, 1 refills  potassium bicarbonate 20 mEq oral tablet, effervescent, 40 mEq= 2 tab(s), Oral, Daily,? ?Not taking  potassium chloride 20 mEq oral TABLET extended release, 20 mEq= 1 tab(s), Oral, BID  Zofran (for IVPB)  Allergies  Bactrim  sulfa drugs?(RASH, DIFFICULTY BREATHIN)  Social History  Abuse/Neglect  No, 04/09/2020  Alcohol - Denies Alcohol Use, 09/01/2014  Current, Wine, Liquor, 1-2 times per month, 05/02/2019  Employment/School  Employed, 11/25/2015  Exercise  Exercise frequency: 1-2 times/week. Exercise type: Walking., 12/17/2019  Home/Environment  Lives with Children. Living situation: Home/Independent., 11/12/2019  Nutrition/Health  Regular, Fair, 11/12/2019  Sexual  Sexually active: Yes. Number of current partners 1. Sexual orientation: Straight or heterosexual. Gender Identity Identifies as female., 01/02/2020  Spiritual/Cultural  Pent[employer]ostal, 12/17/2019  Substance Use - Denies Substance Abuse, 09/01/2014  Never, 04/25/2019  Never, 05/31/2016  Tobacco - Denies Tobacco Use, 09/01/2014  Never (less than 100 in lifetime), No, 04/09/2020  Family History  Hypertension.: Mother.  Malignant neoplasm of uterus: Negative: Aunt.  Primary malignant neoplasm of breast: Grandmother.  Immunizations  Vaccine Date Status   pneumococcal 13-valent conjugate vaccine 03/05/2020 Given   influenza virus vaccine, inactivated 11/12/2019 Given   influenza virus vaccine, inactivated 12/07/2016 Given   tetanus/diphtheria/pertussis, acel(Tdap) 12/07/2016 Given

## 2022-05-05 NOTE — HISTORICAL [facility] CERNER
This is a historical note converted from [employer]. Formatting and pictures may have been removed.  Please reference Liz for original formatting and attached multimedia. Chief Complaint  breast cancer  History of Present Illness  Problem List:  1. Stage IIIB (cT4b cN1 M0) Multifocal Infiltrating Ductal Carcinoma of the Right Breast  -Diagnosed: 10/01/2019  -1.2 cm, DCIS present, Right axillary LN positive  -ER positive (93.2%), DE positive (90.3%), HER-2 negative, Ki-67: 15.3% (borderline)  ?   Current Treatment:  1. Lupron monthly. Started 7/28/2020.  ?   2. Arimidex 1mg po daily to start 6-8 weeks after initiation of Lupron (late September).  Started 9/18/2020.  ?   Treatment History:  1. Neoadjuvant DDAC x4 cycles: 12/13/2019-01/24/2020  2. Neoadjuvant Taxol 80 mg/m? IV weekly x12 cycles. Started 2/7/2020. Completed 12 cycles on 4/23/2020.  3. 05/27/2020: Simple right mastectomy, right axillary lymph node dissection  3.? XRT 6/2/2020 to 8/7/2020.  ?   History of present illness:  44-year-old female referred by Dr. Stephanie Silva for breast cancer.  ?   For a full, detailed history, please see the note dated 8/20/2020.  ?   Interval History  7/20/2021: Patient presents today for scheduled follow up visit. She is currently taking Lupron injection and recently completed plastic breast surgery.?She has a YANG drain to right flank draining serous sang. drainage. She also reports that during surgery her mediport was removed. She denies any complaints at this time. She reports that she was out of her rx for Arimidex and needed refill. She missed her last dose of Lupron due to Covid virus. Lab work reviewed with patient, stable. Denies any further questions or concerns.  Review of Systems  A complete 12-point ROS was performed in full with pertinent positives as described in interval history. Remainder of ROS done in full and are negative.  ?  Physical Exam  Vitals & Measurements  T:?36.9? ?C (Oral)? HR:?103(Peripheral)?  RR:?18? BP:?151/84?  HT:?167?cm? WT:?97.7?kg? BMI:?34.46?  Physical Exam:  General: Alert and oriented, No acute distress.?  Appearance: Well-groomed wearing mask  HEENT: Normocephalic, Oral mucosa is moist. Pupils are equal, round and reactive to light, Extraocular movements are intact, Normal conjunctiva.?  Neck: Supple, Non-tender, No lymphadenopathy, No thyromegaly.?  Respiratory: Lungs are clear to auscultation, Respirations are non-labored, Breath sounds are equal, Symmetrical chest wall expansion.?  Cardiovascular: Normal rate, Regular rhythm, No edema.?  Breast: Breast exam not performed on todays visit.?  Gastrointestinal: Rounded, Soft, Non-tender, Non-distended, Normal bowel sounds.?  Musculoskeletal: Normal strength. Ambulates without assistance  Integumentary: Warm, dry, intact.?  Neurologic: Alert, Oriented, No focal deficits, Cranial Nerves II-XII are grossly intact.?  Cognition and Speech: Oriented, Speech clear and coherent.?  Psychiatric: Cooperative, Appropriate mood & affect.?  ECOG Performance Scale:?1 - Capable of all self-care able to carry out light work activities.  Assessment/Plan  1.?Breast cancer?C50.919  ?#Multifocal infiltrating ductal carcinoma of right breast, status post right axillary lymph node biopsy?(positive: 10/01/2019)  At least?5 breast masses,?multiple?abnormal?lymph nodes?in axilla (mammogram and ultrasound 09/24/2019)  IDC, intermediate grade, at least 1.2 cm, along with DCIS; right axillary lymph node positive (biopsy 10/01/2019)  ER positive (93.2%).? [place] positive (90.3%).? HER-2 negative ([facility], FISH testing).? Ki-67 15.3% (borderline positive)  Left breast masses benign  -->  Orange peel appearance?of skin of right breast,?involving <1/3 of the skin?(physical exam 11/11/2019)  No palpable regional lymphadenopathy?but?right axillary lymph node positive on biopsy?(physical exam?11/11/2019)  -No metastasis?(bone scan and CTs?11/2019)  -LVEF 60% (TTE?11/2019)  -->  cT4b cN1  M0, AJCC anatomic stage IIIB, clinical prognostic stage stage IIIB  -11/25/2019:?????Inflammatory breast carcinoma  -->  ?cT4d cN1 M0, AJCC anatomic stage IIIB, clinical prognostic stage stage IIIB  (Biopsy positive?axillary lymph node)  ?ER positive. ?GA positive.? HER-2 negative.  ?-Neoadjuvant DDAC x4 (12/13/2019-01/24/2020), clinically, with good response  -Neoadjuvant weekly Taxol x12 (02/07/2020-04/23/2020)?  ?-05/27/2020: Simple right mastectomy, right axillary lymph node dissection:  Pathology: Total right mastectomy; invasive carcinoma; multiple foci of invasive carcinoma; largest invasive carcinoma, at least 2.0 cm; overall grade 1; DCIS present, positive for extensive intraductal component; size of DCIS, 5 cm, cribriform, clinging, micropapillary, nuclear grade 3, central necrosis; invasive carcinoma margins uninvolved, 3.4 cm; DCIS margins uninvolved, 3.4 cm; number of lymph nodes examined, 20; number of sentinel lymph nodes examined, 3; number of lymph nodes with macro metastasis, 3; number of lymph nodes with micrometastasis, 2; number of lymph nodes with isolated tumor cells, 0; no extranodal extension; no definite response to presurgical therapy in the invasive carcinoma; no definite response to presurgical therapy in the metastatic carcinoma; lymphovascular invasion present  -->????pT2 pN2a?(2.0 cm tumor;?5 lymph nodes positive)?(no definitive response to?neoadjuvant chemotherapy)  2. ?Right axillary SLN #1, excisional biopsy: Negative for carcinoma  3 Right axillary SLN #2, excisional biopsy: 1 lymph node, positive for micrometastasis  4. ?Right axillary SLN #3, excisional biopsy: 1 lymph node, positive for 1 micrometastasis  5. ?Right axillary lymph node, dissection permanent: 1/15 lymph nodes identified  ?ER positive (60%). ?GA positive (99%). ?HER-2 not overexpressed (score 0). ?Ki-67 low proliferation (2%)  -No metastasis?(CT C/A/P, bone scan: 07/01/2020;?brain MRI: 07/07/2020)  -Normal BMD  (DEXA: 07/01/2020)  ????-----  ?   #????Premenopausal as of 11/11/2019 by history  -No menstrual cycles since December 2019?(after starting neoadjuvant chemotherapy)  ----  ?   # ????Family history of breast cancer and uterine cancers  ?-12/02/2019: Genetic testing:?Three (3) variants of uncertain significance (VUS): AXIN2 c.1573C>G (p.Krw957Rqp), msh3 c.2005C>T (p.Hlm875Aby), and POLE c.1007A>G (p.Ixz615Svt)  ----  ?   #Exophytic fibroid versus left adnexal mass (4 cm, solid) on CT 11/22/2019  ?12/03/2019: Pelvic ultrasound: No discrete sonographic pathology  -----  ?Plan:  -Since she has aggressive, node positive disease,?therefore,?for adjuvant endocrine therapy,?she will benefit from?ovarian suppression?plus aromatase inhibitor?therapy.  ?   Recommendation:?Counseling for fertility concerns?if premenopausal; pregnancy test in all women of childbearing potential.  She is already status post tubal ligation.  ?   ?-Significant residual?disease?(2 cm invasive carcinoma, 5 lymph nodes positive)?post neoadjuvant chemotherapy, with no definitive response?to neoadjuvant chemotherapy  -Metastasis have been ruled out  -Needs adjuvant radiation therapy?(already receiving)  -Was premenopausal as of 11/11/2019, before starting neoadjuvant chemotherapy  -Lupron shots monthly  ?   ?No evidence?of disease recurrence. Continue?surveillance.  Continue Lupron monthly;?due?8/17/2021.  RTC in 8 weeks for Lupron only 9/14/2021  Follow up W/NP in 3 months with Lupron injection  ?   ?Breast cancer surveillance:  -History and physical exam 1-4 times per year for 5 years, then annually;?  ?  -Mammogram every 12 months, with no clear advantage to shorter interval imaging; patient should wait 6-12 months after the completion of radiation therapy to begin their annual mammogram surveillance; suspicious findings on physical examination or surveillance imaging might warrant a shorter interval between mammograms;?  ?  -Educate, monitor, and  referred for lymphedema management  ?  -Women on an aromatase inhibitor or who experience ovarian failure secondary to treatment, should have monitoring of bone health with a bone mineral density determination at baseline and periodically thereafter (the use of a bisphosphonate, oral or IV, or denosumab is acceptable to maintain or to improve bone mineral density and reduce risk of fractures in postmenopausal (natural or induced) patients receiving adjuvant endocrine therapy. ?Optimal duration of therapy has not been established. Duration beyond 3 years is not known. ?Women treated with a bisphosphonate or denosumab should take supplemental calcium and vitamin D.  ?  -Routine imaging of reconstructed breast is not indicated;?  ?  -In the absence of clinical signs or symptoms suggestive of recurrent disease, there is no indication for laboratory or imaging studies for metastasis screening;?  ?  -Active lifestyle, healthy diet, limited alcohol intake, and achieving and maintaining an ideal body weight (20-25 BMI) may lead to optimal breast cancer outcomes.  Ordered:  ?  2.?Long term (current) use of aromatase inhibitors?Z79.811  ?Needs adjuvant endocrine therapy  For adjuvant endocrine therapy, will treat her with LHRH agonist plus aromatase inhibitor (Arimidex) for 5 years  Arimidex to start?6-8 weeks after initiation of monthly LHRH agonist shots  Baseline DEXA scan (03/21/2020, with normal BMD?  ?Normal BMD on baseline?DEXA scan (07/01/2020)  ?Advised to start taking calcium and vitamin D supplements (calcium 1200 mg daily and vitamin D3 800-1000?units daily)??  ?  ?Continue Arimidex daily. Prescription sent to patients pharmacy.  Continue calcium + vitamin D supplementation daily.  Repeat?DEXA scan in 2 years (7/2022)?  Ordered:  ?  3.?Chemotherapy-induced peripheral neuropathy?G62.0  ?#Taxol-induced peripheral neuropathy  -Gabapentin started 02/19/2020  -Cymbalta 60 mg p.o. nightly started?03/18/2020?  ?    ?Continue gabapentin and Cymbalta for Taxol-induced peripheral neuropathy. Refill sent to patients pharmacy.?  Ordered:  ?  Referrals  Clinic Follow up, 07/20/21 14:30:00 CDT  Lab Draw, *Est. 09/14/21 3:00:00 CDT, Order for future visit, Breast cancer  Long term (current) use of aromatase inhibitors  Chemotherapy-induced peripheral neuropathy  Treatment/Doctor Visit, *Est. 09/14/21 3:00:00 CDT, f/u w/np lupron, Order for future visit, Breast cancer  Long term (current) use of aromatase inhibitors  Chemotherapy-induced peripheral neuropathy   Problem List/Past Medical History  Ongoing  Breast cancer  Breast cancer of lower-inner quadrant of right female breast  Chemotherapy-induced peripheral neuropathy  Long term (current) use of aromatase inhibitors  Lymphedema of upper limb  Obesity  Historical  Pregnant  Pregnant  Procedure/Surgical History  Surgery care (05/25/2021)  Axillary Node Dissection (Right) (05/27/2020)  Biopsy or excision of lymph node(s); open, deep axillary node(s) (05/27/2020)  Excision of Right Breast, Open Approach (05/27/2020)  Injection procedure; radioactive tracer for identification of sentinel node (05/27/2020)  Intraoperative identification (eg, mapping) of sentinel lymph node(s) includes injection of non-radioactive dye, when performed (List separately in addition to code for primary procedure) (05/27/2020)  Mastectomy Simple (Right) (05/27/2020)  Mastectomy, simple, complete (05/27/2020)  Planar Nuclear Medicine Imaging of Upper Chest Lymphatics using Other Radionuclide (05/27/2020)  Planar Nuclear Medicine Imaging of Upper Chest Lymphatics using Technetium 99m (Tc-99m) (05/27/2020)  Resection of Right Axillary Lymphatic, Open Approach (05/27/2020)  Catheter Insertion Mediport (None) (11/27/2019)  Insertion of Totally Implantable Vascular Access Device into Chest Subcutaneous Tissue and Fascia, Open Approach (11/27/2019)  Insertion of tunneled centrally inserted central venous access  device, with subcutaneous port; age 5 years or older (11/27/2019)  Mammogram (2019)  Biopsy of breast  [place]  Tubal ligation   Medications  acetaminophen-oxycodone 325 mg-10 mg oral tablet, 1 tab(s), Oral, q4hr  acetaminophen-oxycodone 325 mg-5 mg oral tablet, 1 tab(s), Oral, q6hr  anastrozole 1 mg oral tablet, 1 mg= 1 tab(s), Oral, Daily, 3 refills  calcium carbonate 600 mg oral tablet, 600 mg= 1 tab(s), Oral, BID  Colace 100 mg oral capsule, 100 mg= 1 cap(s), Oral, BID, PRN, 5 refills  Cymbalta 60 mg oral delayed release capsule, 60 mg= 1 cap(s), Oral, At Bedtime  gabapentin 400 mg oral capsule, 400 mg= 1 cap(s), Oral, TID, 3 refills  hydrochlorothiazide-lisinopril 12.5 mg-10 mg oral tablet, 1 tab(s), Oral, Daily  ibuprofen 800 mg oral tablet, 800 mg= 1 tab(s), Oral, q6hr  Lupron, 3.75 mg= 1 EA, Subcutaneous, q24hr  ondansetron 4 mg oral tablet, disintegrating, 4 mg= 1 tab(s), Oral, q12hr  Allergies  Bactrim  sulfa drugs?(RASH, DIFFICULTY BREATHIN)  Social History  Abuse/Neglect  No, 07/20/2021  Alcohol - Denies Alcohol Use, 09/01/2014  Current, 1-2 times per month, 06/03/2021  Employment/School  Employed, 11/25/2015  Exercise  Exercise frequency: 1-2 times/week. Exercise type: Walking., 12/17/2019  Home/Environment  Lives with Children. Living situation: Home/Independent., 11/12/2019  Nutrition/Health  Regular, Fair, 11/12/2019  Sexual  Sexually active: Yes. Number of current partners 1. Sexual orientation: Straight or heterosexual. Gender Identity Identifies as female., 01/02/2020  Spiritual/Cultural  Spiritism, 12/17/2019  Substance Use - Denies Substance Abuse, 09/01/2014  Never, 06/03/2021  Tobacco - Denies Tobacco Use, 09/01/2014  Never (less than 100 in lifetime), No, 07/20/2021  Family History  Hypertension.: Mother.  Malignant neoplasm of uterus: Negative: Aunt.  Primary malignant neoplasm of breast: Grandmother.  Immunizations  Vaccine Date Status   influenza virus vaccine, inactivated 09/22/2020 Given    pneumococcal 13-valent conjugate vaccine 03/05/2020 Given   influenza virus vaccine, inactivated 11/12/2019 Given   influenza virus vaccine, inactivated 12/07/2016 Given   tetanus/diphtheria/pertussis, acel(Tdap) 12/07/2016 Given   Health Maintenance  Health Maintenance  ???Pending?(in the next year)  ??? ??OverDue  ??? ? ? ?Cervical Cancer Screening due??07/25/19??and every 3??year(s)  ??? ? ? ?Influenza Vaccine due??10/01/20??and every 1??day(s)  ??? ??Due?  ??? ? ? ?Medicare Annual Wellness Exam due??07/20/21??and every 1??year(s)  ??? ??Due In Future?  ??? ? ? ?ADL Screening not due until??09/16/21??and every 1??year(s)  ??? ? ? ?Obesity Screening not due until??01/01/22??and every 1??year(s)  ??? ? ? ?Alcohol Misuse Screening not due until??01/02/22??and every 1??year(s)  ???Satisfied?(in the past 1 year)  ??? ??Satisfied?  ??? ? ? ?ADL Screening on??09/16/20.??Satisfied by Kassidy Swanson LPN  ??? ? ? ?Alcohol Misuse Screening on??06/25/21.??Satisfied by Malorie Ku LPN  ??? ? ? ?Blood Pressure Screening on??07/20/21.??Satisfied by Nena Mcconnell LPN  ??? ? ? ?Body Mass Index Check on??07/20/21.??Satisfied by Nena Mcconnell LPN  ??? ? ? ?Breast Cancer Screening on??11/17/20.??Satisfied by Bella Crouch  ??? ? ? ?Depression Screening on??07/20/21.??Satisfied by Nena Mcconnell LPN  ??? ? ? ?Diabetes Screening on??07/20/21.??Satisfied by Amarilys Patino  ??? ? ? ?Hypertension Management-Blood Pressure on??07/20/21.??Satisfied by Nena Mcconnlel LPN  ??? ? ? ?Influenza Vaccine on??03/30/21.??Satisfied by Sky FOUNTAIN, Kassidy  ??? ? ? ?Obesity Screening on??07/20/21.??Satisfied by Nena Mcconnell LPN  ?  Lab Results  Test Name Test Result Date/Time   Sodium Lvl 142 mmol/L 07/20/2021 13:48 CDT   Potassium Lvl 3.5 mmol/L 07/20/2021 13:48 CDT   Chloride 102 mmol/L 07/20/2021 13:48 CDT   CO2 35 mmol/L (High) 07/20/2021 13:48 CDT   Calcium Lvl 10.0 mg/dL  07/20/2021 13:48 CDT   Glucose Lvl 103 mg/dL (High) 07/20/2021 13:48 CDT   BUN 9.9 mg/dL 07/20/2021 13:48 CDT   Creatinine 0.77 mg/dL 07/20/2021 13:48 CDT   eGFR- 07/20/2021 13:48 CDT   eGFR-BHAVANI 86 mL/min/1.73 m2 (Low) 07/20/2021 13:48 CDT   Bili Total 0.5 mg/dL 07/20/2021 13:48 CDT   Bili Direct 0.2 mg/dL 07/20/2021 13:48 CDT   Bili Indirect 0.30 mg/dL 07/20/2021 13:48 CDT   AST 15 unit/L 07/20/2021 13:48 CDT   ALT 13 unit/L 07/20/2021 13:48 CDT   Alk Phos 100 unit/L 07/20/2021 13:48 CDT   Total Protein 6.9 gm/dL 07/20/2021 13:48 CDT   Albumin Lvl 3.3 gm/dL (Low) 07/20/2021 13:48 CDT   Globulin 3.6 gm/dL (High) 07/20/2021 13:48 CDT   A/G Ratio 0.9 ratio (Low) 07/20/2021 13:48 CDT   WBC 8.7 x10(3)/mcL 07/20/2021 13:48 CDT   RBC 3.12 x10(6)/mcL (Low) 07/20/2021 13:48 CDT   Hgb 9.5 gm/dL (Low) 07/20/2021 13:48 CDT   Hct 29.7 % (Low) 07/20/2021 13:48 CDT   Platelet 308 x10(3)/mcL 07/20/2021 13:48 CDT   MCV 95.2 fL 07/20/2021 13:48 CDT   MCH 30.4 pg 07/20/2021 13:48 CDT   MCHC 32.0 gm/dL 07/20/2021 13:48 CDT   RDW 13.4 % 07/20/2021 13:48 CDT   MPV 9.4 fL 07/20/2021 13:48 CDT   Abs Neut 4.64 x10(3)/mcL 07/20/2021 13:48 CDT   Neutro Auto 54 % 07/20/2021 13:48 CDT   Lymph Auto 36 % 07/20/2021 13:48 CDT   Mono Auto 6 % 07/20/2021 13:48 CDT   Eos Auto 3 % 07/20/2021 13:48 CDT   Abs Eos 0.2 x10(3)/mcL 07/20/2021 13:48 CDT   Basophil Auto 0 % 07/20/2021 13:48 CDT   Abs Neutro 4.64 x10(3)/mcL 07/20/2021 13:48 CDT   Abs Lymph 3.1 x10(3)/mcL 07/20/2021 13:48 CDT   Abs Mono 0.6 x10(3)/mcL 07/20/2021 13:48 CDT   Abs Baso 0.0 x10(3)/mcL 07/20/2021 13:48 CDT   NRBC% 0.0 % 07/20/2021 13:48 CDT   IG% 1 % 07/20/2021 13:48 CDT   IG# 0.110 07/20/2021 13:48 CDT

## 2022-05-05 NOTE — HISTORICAL [facility] CERNER
This is a historical note converted from [employer]. Formatting and pictures may have been removed.  Please reference [employer] for original formatting and attached multimedia. Indication for Surgery  breast cancer.? needs access for chemotherapy  Preoperative Diagnosis  breast cancer  Postoperative Diagnosis  breast cancer  Operation  right subclavian mediport  Surgeon(s)  MD Andrzej (PGY5)  MD Lawanda (attending)  Assistant  MD Destini (PGY1)  Anesthesia  MAC  Estimated Blood Loss  10cc  Urine Output  see anesthesia records  Findings  adequate mediport placement  Specimen(s)  none  Complications  none  Technique  anesthesia was induced and she was prepped and draped in sterile fashion. using seldinger technique the right subclavian vein was access on initial stick. wire was passed via needle and xr was shot with wire in good position. 5cm incision was created below wire site and pocket was created bluntly. catheter was tunneled from pocket to wire. the introducer was passed over the wire under fluoro guidance. catheter was pased through introducer and tip placed in SVC above atriocaval junction. Mediport was attached to catheter and secured in pocket. port was flushed and aspirated easily then locked with heparin. incision was closed with vicryl and then PDS.   This certifies I was present during the key portion of this surgical procedure.

## 2022-05-05 NOTE — HISTORICAL [facility] CERNER
This is a historical note converted from [place]. Formatting and pictures may have been removed.  Please reference Liz for original formatting and attached multimedia. Chief Complaint  breast cancer  History of Present Illness  Problem List:  1. Stage IIIB (cT4b cN1 M0) Multifocal Infiltrating Ductal Carcinoma of the Right Breast  -Diagnosed: 10/01/2019  -1.2 cm, DCIS present, Right axillary LN positive  -ER positive (93.2%), IL positive (90.3%), HER-2 negative, Ki-67: 15.3% (borderline)  ?   Current Treatment:  1. Lupron monthly. Started 7/28/2020.  ?   2. Arimidex 1mg po daily to start 6-8 weeks after initiation of Lupron (late September).  Started 9/18/2020.  ?   Treatment History:  1. Neoadjuvant DDAC x4 cycles: 12/13/2019-01/24/2020  2. Neoadjuvant Taxol 80 mg/m? IV weekly x12 cycles. Started 2/7/2020. Completed 12 cycles on 4/23/2020.  3. 05/27/2020: Simple right mastectomy, right axillary lymph node dissection  3.? XRT 6/2/2020 to 8/7/2020.  ?   History of present illness:  44-year-old female referred by Dr. Stephanie Silva for breast cancer.  ?   For a full, detailed history, please see the note dated 8/20/2020.  ?   Interval History  1/5/2022: Patient presents today for scheduled follow up on Arimidex; she is scheduled to receive Lupron today. She presents alone and reports adherence to Arimidex; she complains of hot flashes & joint pain. Advised her to try Tylenol arthritis or Motrin or voltaren gel for joint pain and black cohosh or evening primrose for hot flashes. She also complains of right axillary edema after decorating for the holidays. She has?a lymphedema sleeve and machine; advised her to use it as she states she does not. She also reports itching after starting vitamin D; advised her to inform the prescriber but instructed her to continue taking all prescribed medications. CMP stable; CBC?unremarkable. Reviewed recent MMG results with her; informed her next MMG is due 11/2022. Will continue  monthly Lupron and have her follow up in 3 months with Lupron and labs. She is amenable to this plan.  Review of Systems  A complete 12-point?ROS was performed in full with pertinent positives as described in interval history. Remainder of ROS done in full and are negative.  Physical Exam  Vitals & Measurements  T:?37.4? ?C (Oral)? HR:?77(Peripheral)? RR:?20? BP:?127/87?  BMI:?32.14?  General: ?Alert and oriented, No acute distress.??  Appearance: Well-groomed; wearing face mask.  HEENT: ?Normocephalic, Oral mucosa is moist.?Pupils are equal, round and reactive to light, Extraocular movements are intact, Normal conjunctiva.?  Neck: ?Supple, Non-tender, No lymphadenopathy, No thyromegaly.??  Respiratory: ?Lungs are clear to auscultation, Respirations are non-labored, Breath sounds are equal, Symmetrical chest wall expansion.??  Cardiovascular: ?Normal rate, Regular rhythm, No edema.??  Breast:??Breast exam not performed on todays visit.??  Gastrointestinal: Rounded,?Soft, Non-tender, Non-distended, Normal bowel sounds.??  Musculoskeletal: ?Normal strength.??  Integumentary: ?Warm, dry, intact.??  Neurologic: ?Alert, Oriented, No focal deficits, Cranial Nerves II-XII are grossly intact.??  Cognition and Speech: ?Oriented, Speech clear and coherent.??  Psychiatric: ?Cooperative, Appropriate mood & affect. ?  ECOG Performance Scale:?1 - Capable of light work.?  Assessment/Plan  1.?Breast cancer of lower-inner quadrant of right female breast?C50.311  #Multifocal infiltrating ductal carcinoma of right breast, status post right axillary lymph node biopsy?(positive: 10/01/2019)  At least?5 breast masses,?multiple?abnormal?lymph nodes?in axilla (mammogram and ultrasound 09/24/2019)  IDC, intermediate grade, at least 1.2 cm, along with DCIS; right axillary lymph node positive (biopsy 10/01/2019)  ER positive (93.2%).? KS positive (90.3%).? HER-2 negative (IHC, FISH testing).? Ki-67 15.3% (borderline positive)  Left breast  masses benign  -->  Orange peel appearance?of skin of right breast,?involving <1/3 of the skin?(physical exam 11/11/2019)  No palpable regional lymphadenopathy?but?right axillary lymph node positive on biopsy?(physical exam?11/11/2019)  -No metastasis?(bone scan and CTs?11/2019)  -LVEF 60% (TTE?11/2019)  -->  cT4b cN1 M0, AJCC anatomic stage IIIB, clinical prognostic stage stage IIIB  -11/25/2019:??Inflammatory breast carcinoma  -->  cT4d cN1 M0, AJCC anatomic stage IIIB, clinical prognostic stage stage IIIB  (Biopsy positive?axillary lymph node)  ER positive. ?FL positive.? HER-2 negative.  -Neoadjuvant DDAC x4 (12/13/2019-01/24/2020), clinically, with good response  -Neoadjuvant weekly Taxol x12 (02/07/2020-04/23/2020)?  -05/27/2020: Simple right mastectomy, right axillary lymph node dissection:  Pathology: Total right mastectomy; invasive carcinoma; multiple foci of invasive carcinoma; largest invasive carcinoma, at least 2.0 cm; overall grade 1; DCIS present, positive for extensive intraductal component; size of DCIS, 5 cm, cribriform, clinging, micropapillary, nuclear grade 3, central necrosis; invasive carcinoma margins uninvolved, 3.4 cm; DCIS margins uninvolved, 3.4 cm; number of lymph nodes examined, 20; number of sentinel lymph nodes examined, 3; number of lymph nodes with macro metastasis, 3; number of lymph nodes with micrometastasis, 2; number of lymph nodes with isolated tumor cells, 0; no extranodal extension; no definite response to presurgical therapy in the invasive carcinoma; no definite response to presurgical therapy in the metastatic carcinoma; lymphovascular invasion present  -->??pT2 pN2a?(2.0 cm tumor;?5 lymph nodes positive)?(no definitive response to?neoadjuvant chemotherapy)  2. ?Right axillary SLN #1, excisional biopsy: Negative for carcinoma  3 Right axillary SLN #2, excisional biopsy: 1 lymph node, positive for micrometastasis  4. ?Right axillary SLN #3, excisional biopsy: 1 lymph  node, positive for 1 micrometastasis  5. ?Right axillary lymph node, dissection permanent: 1/15 lymph nodes identified  ER positive (60%). ?W[place] positive (99%). ?HER-2 not overexpressed (score 0). ?Ki-67 low proliferation (2%)  -No metastasis?(CT C/A/P, bone scan: 07/01/2020;?brain MRI: 07/07/2020)  -Normal BMD (DEXA: 07/01/2020)  -11/24/2021: Bilateral diagnostic contrast-enhanced?MMG with tiffany: Right Breast: Benign (BI-RADS 2)?Left Breast: Benign (BI-RADS 2)?  ??????-----  ?   #??????Premenopausal as of 11/11/2019 by history  -No menstrual cycles since December 2019?(after starting neoadjuvant chemotherapy)  ----  ?   # ??????Family history of breast cancer and uterine cancers  -12/02/2019: Genetic testing:?Three (3) variants of uncertain significance (VUS): AXIN2 c.1573C>G (p.Ztr755Fpi), msh3 c.2005C>T (p.Oir489Dss), and POLE c.1007A>G (p.Aja171Evb)  ----  ?   #Exophytic fibroid versus left adnexal mass (4 cm, solid) on CT 11/22/2019 12/03/2019: Pelvic ultrasound: No discrete sonographic pathology  -----  ????  Plan:  -Since she has aggressive, node positive disease,?therefore,?for adjuvant endocrine therapy,?she will benefit from?ovarian suppression?plus aromatase inhibitor?therapy.  ?   Recommendation:?Counseling for fertility concerns?if premenopausal; pregnancy test in all women of childbearing potential.  She is already status post tubal ligation.  ?   -Significant residual?disease?(2 cm invasive carcinoma, 5 lymph nodes positive)?post neoadjuvant chemotherapy, with no definitive response?to neoadjuvant chemotherapy  -Metastasis have been ruled out  -Needs adjuvant radiation therapy?(already receiving)  -Was premenopausal as of 11/11/2019, before starting neoadjuvant chemotherapy  -Lupron shots monthly  ?   No evidence?of disease recurrence. Continue?surveillance.  Continue Lupron monthly;?due?today, 2/2/2022, 3/2/2022, & 3/30/2022.  Patient scheduled for costmetic breast surgery in 3/2022.  Follow up in 3  months (F2F on 4/27/2022) with CBC, CMP prior to Lupron.  Annual screening MMG?bilateral?due 11/2022; ordered; will have scheduled.  ?   Breast cancer surveillance:  -History and physical exam 1-4 times per year for 5 years, then annually;?  ?  -Mammogram every 12 months, with no clear advantage to shorter interval imaging; patient should wait 6-12 months after the completion of radiation therapy to begin their annual mammogram surveillance; suspicious findings on physical examination or surveillance imaging might warrant a shorter interval between mammograms;?  ?  -Educate, monitor, and referred for lymphedema management  ?  -Women on an aromatase inhibitor or who experience ovarian failure secondary to treatment, should have monitoring of bone health with a bone mineral density determination at baseline and periodically thereafter (the use of a bisphosphonate, oral or IV, or denosumab is acceptable to maintain or to improve bone mineral density and reduce risk of fractures in postmenopausal (natural or induced) patients receiving adjuvant endocrine therapy. ?Optimal duration of therapy has not been established. Duration beyond 3 years is not known. ?Women treated with a bisphosphonate or denosumab should take supplemental calcium and vitamin D.  ?  -Routine imaging of reconstructed breast is not indicated;?  ?  -In the absence of clinical signs or symptoms suggestive of recurrent disease, there is no indication for laboratory or imaging studies for metastasis screening;?  ?  -Active lifestyle, healthy diet, limited alcohol intake, and achieving and maintaining an ideal body weight (20-25 BMI) may lead to optimal breast cancer outcomes.  Ordered:  ?  2.?Long term (current) use of aromatase inhibitors?Z79.81Feng  Needs adjuvant endocrine therapy  For adjuvant endocrine therapy, will treat her with LHRH agonist plus aromatase inhibitor (Arimidex) for 5 years  Arimidex to start?6-8 weeks after initiation of monthly LHRH  agonist shots  Baseline DEXA scan (03/21/2020, with normal BMD?  Normal BMD on baseline?DEXA scan (07/01/2020)  Advised to start taking calcium and vitamin D supplements (calcium 1200 mg daily and vitamin D3 800-1000?units daily)??  ?   Continue Arimidex daily. Prescription sent to patients pharmacy.  Continue calcium + vitamin D supplementation daily.  Repeat?DEXA scan in 2 years (7/2022); ordered today.  Ordered:  ?  3.?Chemotherapy-induced peripheral neuropathy?G62.0  #Taxol-induced peripheral neuropathy  -Gabapentin started 02/19/2020  -Cymbalta 60 mg p.o. nightly started?03/18/2020?  ?   Continue gabapentin and Cymbalta for Taxol-induced peripheral neuropathy. Refill sent to patients pharmacy.?  Ordered:  ?   Problem List/Past Medical History  Ongoing  Breast cancer  Breast cancer of lower-inner quadrant of right female breast  Chemotherapy-induced peripheral neuropathy  Hypertension  Long term (current) use of aromatase inhibitors  Lymphedema of upper limb  Obesity  Historical  Pregnant  Pregnant  Procedure/Surgical History  Casirivi and imdevi inj (06/30/2021)  Surgery care (05/25/2021)  Axillary Node Dissection (Right) (05/27/2020)  Biopsy or excision of lymph node(s); open, deep axillary node(s) (05/27/2020)  Excision of Right Breast, Open Approach (05/27/2020)  Injection procedure; radioactive tracer for identification of sentinel node (05/27/2020)  Intraoperative identification (eg, mapping) of sentinel lymph node(s) includes injection of non-radioactive dye, when performed (List separately in addition to code for primary procedure) (05/27/2020)  Mastectomy Simple (Right) (05/27/2020)  Mastectomy, simple, complete (05/27/2020)  Planar Nuclear Medicine Imaging of Upper Chest Lymphatics using Other Radionuclide (05/27/2020)  Planar Nuclear Medicine Imaging of Upper Chest Lymphatics using Technetium 99m (Tc-99m) (05/27/2020)  Resection of Right Axillary Lymphatic, Open Approach (05/27/2020)  Catheter  Insertion Mediport (None) (11/27/2019)  Insertion of Totally Implantable Vascular Access Device into Chest Subcutaneous Tissue and Fascia, Open Approach (11/27/2019)  Insertion of tunneled centrally inserted central venous access device, with subcutaneous port; age 5 years or older (11/27/2019)  Mammogram (2019)  Biopsy of breast  Cerclage  Tubal ligation   Medications  anastrozole 1 mg oral tablet, 1 mg= 1 tab(s), Oral, Daily, 3 refills  calcium carbonate 600 mg oral tablet, 600 mg= 1 tab(s), Oral, BID  cholecalciferol 1000 intl units (25 mcg) oral tablet, 25 mcg= 1 tab(s), Oral, Daily, 11 refills  Colace 100 mg oral capsule, 100 mg= 1 cap(s), Oral, BID, PRN, 5 refills  Cymbalta 60 mg oral delayed release capsule, 60 mg= 1 cap(s), Oral, At Bedtime, 3 refills  gabapentin 400 mg oral capsule, 400 mg= 1 cap(s), Oral, TID, 3 refills,? ?Still taking, not as prescribed: PRN  hydrochlorothiazide-lisinopril 12.5 mg-10 mg oral tablet, 1 tab(s), Oral, Daily  ibuprofen 800 mg oral tablet, 800 mg= 1 tab(s), Oral, TID  Allergies  Bactrim  sulfa drugs?(RASH, DIFFICULTY BREATHIN)  Social History  Abuse/Neglect  No, No, Yes, 01/05/2022  Alcohol - Denies Alcohol Use, 09/01/2014  Current, 1-2 times per month, 01/05/2022  Employment/School  Employed, 11/25/2015  Exercise  Exercise duration: 0., 11/10/2021  Home/Environment  Lives with Children. Living situation: Home/Independent., 11/12/2019    Never in , 11/10/2021  Nutrition/Health  Regular, Fair, 11/12/2019  Sexual  Sexually active: Yes. Number of current partners 1. Sexual orientation: Straight or heterosexual. Gender Identity Identifies as female., 01/02/2020  Spiritual/Cultural  Sikhism, 12/17/2019  Substance Use - Denies Substance Abuse, 09/01/2014  Never, 01/05/2022  Tobacco - Denies Tobacco Use, 09/01/2014  Never (less than 100 in lifetime), No, 01/05/2022  Family History  Hypertension.: Mother.  Malignant neoplasm of uterus: Negative: Aunt.  Primary  malignant neoplasm of breast: Grandmother.  Immunizations  Vaccine Date Status   COVID-19 MRNA, LNP-S, PF- Pfizer 10/22/2021 Given   influenza virus vaccine, inactivated 10/12/2021 Given   COVID-19 MRNA, LNP-S, PF- Pfizer 10/01/2021 Given   influenza virus vaccine, inactivated 09/22/2020 Given   pneumococcal 13-valent conjugate vaccine 03/05/2020 Given   influenza virus vaccine, inactivated 11/12/2019 Given   influenza virus vaccine, inactivated 12/07/2016 Given   tetanus/diphtheria/pertussis, acel(Tdap) 12/07/2016 Given   Health Maintenance  Health Maintenance  ???Pending?(in the next year)  ??? ??Due?  ??? ? ? ?Alcohol Misuse Screening due??01/02/22??and every 1??year(s)  ??? ??Due In Future?  ??? ? ? ?Influenza Vaccine not due until??10/01/22??and every 1??day(s)  ??? ? ? ?Hypertension Maintenance-Medication Prescribed not due until??10/11/22??and every 1??year(s)  ??? ? ? ?Hypertension Management-Education not due until??10/11/22??and every 1??year(s)  ??? ? ? ?ADL Screening not due until??11/10/22??and every 1??year(s)  ??? ? ? ?Obesity Screening not due until??01/01/23??and every 1??year(s)  ???Satisfied?(in the past 1 year)  ??? ??Satisfied?  ??? ? ? ?ADL Screening on??11/10/21.??Satisfied by Tawana Lanza LPN  ??? ? ? ?Alcohol Misuse Screening on??06/25/21.??Satisfied by Malorie Ku LPN  ??? ? ? ?Blood Pressure Screening on??01/05/22.??Satisfied by Marisa Rosenberg  ??? ? ? ?Body Mass Index Check on??01/05/22.??Satisfied by Marisa Rosenberg  ??? ? ? ?Cervical Cancer Screening on??11/10/21.??Satisfied by Rut GARCIA CT(ASCP)Irina  ??? ? ? ?Depression Screening on??01/05/22.??Satisfied by Marisa Rosenberg  ??? ? ? ?Diabetes Screening on??01/05/22.??Satisfied by Lucio King.  ??? ? ? ?Hypertension Management-Blood Pressure on??01/05/22.??Satisfied by Marisa Rosenberg  ??? ? ? ?Hypertension Management-BMP on??01/05/22.??Satisfied by Lucio King.  ??? ? ? ?Hypertension Management-Education  on??10/11/21.??Satisfied by Joseph Herr MD??Reason: Expectation Satisfied Elsewhere  ??? ? ? ?Hypertension Maintenance-Medication Prescribed on??10/11/21.??Satisfied by Joseph Herr MD  ??? ? ? ?Influenza Vaccine on??01/05/22.??Satisfied by Marisa Rosenberg  ??? ? ? ?Lipid Screening on??10/12/21.??Satisfied by Sana Gardner  ??? ? ? ?Obesity Screening on??01/05/22.??Satisfied by Marisa Rosenberg  ?  Lab Results  Test Name Test Result Date/Time   Sodium Lvl 143 mmol/L 01/05/2022 12:10 CST   Potassium Lvl 3.8 mmol/L 01/05/2022 12:10 CST   Chloride 105 mmol/L 01/05/2022 12:10 CST   CO2 32 mmol/L (High) 01/05/2022 12:10 CST   Calcium Lvl 10.5 mg/dL 01/05/2022 12:10 CST   Glucose Lvl 139 mg/dL (High) 01/05/2022 12:10 CST   BUN 8.1 mg/dL 01/05/2022 12:10 CST   Creatinine 0.78 mg/dL 01/05/2022 12:10 CST   eGFR- 01/05/2022 12:10 CST   eGFR-BHAVANI 85 mL/min/1.73 m2 (Low) 01/05/2022 12:10 CST   Bili Total 0.6 mg/dL 01/05/2022 12:10 CST   Bili Direct 0.2 mg/dL 01/05/2022 12:10 CST   Bili Indirect 0.40 mg/dL 01/05/2022 12:10 CST   AST 26 unit/L 01/05/2022 12:10 CST   ALT 31 unit/L 01/05/2022 12:10 CST   Alk Phos 143 unit/L 01/05/2022 12:10 CST   Total Protein 7.7 gm/dL 01/05/2022 12:10 CST   Albumin Lvl 3.7 gm/dL 01/05/2022 12:10 CST   Globulin 4.0 gm/dL (High) 01/05/2022 12:10 CST   A/G Ratio 0.9 ratio (Low) 01/05/2022 12:10 CST   WBC 6.3 x10(3)/mcL 01/05/2022 12:10 CST   RBC 4.15 x10(6)/mcL 01/05/2022 12:10 CST   Hgb 12.9 gm/dL 01/05/2022 12:10 CST   Hct 38.8 % 01/05/2022 12:10 CST   Platelet 275 x10(3)/mcL 01/05/2022 12:10 CST   MCV 93.5 fL 01/05/2022 12:10 CST   MCH 31.1 pg 01/05/2022 12:10 CST   MCHC 33.2 gm/dL 01/05/2022 12:10 CST   RDW 13.8 % 01/05/2022 12:10 CST   MPV 10.2 fL 01/05/2022 12:10 CST   Abs Neut 2.91 x10(3)/mcL 01/05/2022 12:10 CST   Neutro Auto 46 % 01/05/2022 12:10 CST   Lymph Auto 44 % 01/05/2022 12:10 CST   Mono Auto 7 % 01/05/2022 12:10 CST   Eos Auto 2 % 01/05/2022 12:10 CST   Abs Eos  0.2 x10(3)/mcL 01/05/2022 12:10 CST   Basophil Auto 0 % 01/05/2022 12:10 CST   Abs Neutro 2.91 x10(3)/mcL 01/05/2022 12:10 CST   Abs Lymph 2.8 x10(3)/mcL 01/05/2022 12:10 CST   Abs Mono 0.4 x10(3)/mcL 01/05/2022 12:10 CST   Abs Baso 0.0 x10(3)/[facility] 01/05/2022 12:10 CST   NRBC% 0.0 % 01/05/2022 12:10 CST   IG% 0 % 01/05/2022 12:10 CST   IG# 0.030 01/05/2022 12:10 CST   Diagnostic Results  (11/24/2021 12:04 CST MG Diagnostic Bilateral W Contrast)  ?  Reason For Exam  hx of breast cancer before age 50, right mastecomy w/reconsruction  ?  Radiology Report  ?- MG DIAGNOSTIC BILATERAL W CONTRAST  ?  BILATERAL DIGITAL DIAGNOSTIC MAMMOGRAM WITH CAD: 11/24/2021  Exam(s): Bilateral diagnostic contrast-enhanced mammography including digital 2-D and tomosynthesis CC, MLO, and ML views  ?  Technique: 1.5 mL/kg of low osmolality iodinated contrast Isovue-370 was administrated intravenously at a rate of 3 mL/s via a power injector (with 25 mL normal saline flush pre-injection and 20 mL normal saline flush post-injection). The total contrast dose was 136.8 mL. Low-energy and high-energy mammographic images were then obtained.  ?  ?  Clinical History: 46-year-old woman with history of right breast IDC, DCIS with right axillary lymph node involvement status post right mastectomy on 5/27/2020. She also had benign left breast biopsies performed at an outside facility on 10/1/2019. The patient reports undergoing right breast reconstruction and left breast reduction mammoplasty in 6/2021.  ?  ?  Comparison:  Prior examinations dated 11/17/2020, 10/1/2019, 5/15/2017, 1/18/2016, 1/2/2014, and 7/18/2013 were reviewed for comparison.  ?  ?  Mammographic Findings:  There are scattered areas of fibroglandular density.  ?  There is mild background parenchymal enhancement.  ?  RIGHT BREAST:  Right postmastectomy changes with reconstruction and surgical clip are noted.  ?  No suspicious mass, asymmetry, distortion, or calcification is  identified.?  ?  No suspicious enhancement is identified.  ?  LEFT BREAST:  Postsurgical changes related to reduction mammoplasty, left breast.  ?  Biopsy clips, left breast.  ?  No suspicious mass, asymmetry, distortion, or calcification is identified.?  ?  No suspicious enhancement is identified.  ?  ?  CAD was used in the interpretation of this mammography study.  ?  ?  IMPRESSION: BENIGN  Right Breast: Benign (BI-RADS 2)?  Left Breast: Benign (BI-RADS 2)?  ?  Recommendations:  Recommend continued annual screening mammography (with Digital Breast Tomosynthesis) due in 11/2022, according to American College of Radiology guidelines.  ?  Given the patients history of right breast cancer diagnosed before the age of 50 years, recommend:  1) continued annual diagnostic contrast-enhanced mammography with Digital Breast Tomosynthesis in lieu of the patients next annual screening mammogram due in 11/2022 if the patient has no contraindications to iodinated contrast. Please contact the [facility] radiology department for ordering information.  ?  OR  ?  2) annual breast MRI surveillance (not currently available at [facility]) in addition to annual mammography (with Digital Breast Tomosynthesis), preferably alternating mammography and MRI every 6 months.  ?  Findings and recommendations were discussed by Dr. POLA Drummond with the patient.  ?  ?  ?  ?  ?  Srinivas Drummond M.D. ? ? ? ? ?  lm/:11/24/2021 12:41:54 ?  ?  ?  letter sent: Mammography Normal ?  Mammogram BI-RADS: 2 Benign  ?  Signature Line  Electronically Signed By: Srinivas Drummond MD  Date/Time Signed: 11/24/2021 12:41  ?  ?  This document has an image  ?  Result type:???????MG Diagnostic Bilateral W Contrast  Result date:???????November 24, 2021 12:04 CST  Result status:???????Auth (Verified)  Result title:???????MG Diagnostic Bilateral W Contrast  Performed by:???????Srinivas Drummond MD on November 24, 2021 12:41 CST  Verified by:???????Srinivas Drummond MD on November 24,  2021 12:41 CST  Encounter info:???????[id number], [facility], Outpatient, 11/24/2021 - 11/24/2021 [1]     [1]?MG Diagnostic Bilateral W Contrast; Bhanu ANTONIO, Srinivas CONKLIN 11/24/2021 12:04 CST

## 2022-05-05 NOTE — HISTORICAL [facility] CERNER
This is a historical note converted from [employer]. Formatting and pictures may have been removed.  Please reference [employer] for original formatting and attached multimedia. Indication for Surgery  44 year old female with multifocal infiltrating ductal carcinoma of the right breast and right axillary lymph node biopsy that was positive. He is ER/MD +, HER-2 negative. Ki67 borderline positive. Patient has been treated with neoadjuvant DDACx4 and [id number].  Preoperative Diagnosis  infiltrating ductal carcinoma with +metastasis to right axillary lymph node  Postoperative Diagnosis  metastatic infiltrating ductal carcinoma  Operation  1. simple mastectomy  2. right axillary lymph node dissection  Surgeon(s)  Dr. Silva  Assistant  MD Sarthak PGY-3  MD Santo PGY-1  Anesthesia  GETA  Estimated Blood Loss  100cc  Findings  right simple mastectomy with right axillary sentinel lymph node biopsy x3  previously biopsied right axillary lymph node with biopsy clip was contained in axillary tail of right breast simple mastectomy specimen  2 of 3 sentinel lymph nodes (separate from previously biopsied axillary lymph node) were positive for carcinoma requiring axillary lymph node dissection  Specimen(s)  1. right breast mastectomy  2. right axillary sentinel lymph node biopsy #1 - not blue, not hot, palpable  3. right axillary sentinel lymph node biopsy #2 - not blue, not hot, palpable  4. right axillary sentinel lymph node biopsy #3 - not blue, not hot, palpable  5. right axillary lymph node dissection  Complications  none  Technique  Prior to the operating room the patient was injected with Technetium-99 (TC-99) sulfur colloid by the nuclear medicine. The patient was then brought to the operating room and placed supine on the operative table. General anesthesia was induced. 3 cc?s of isosulfan blue dye was injected in the subareolar space of the right breast?and gently massaged. The skin of the Right breast was prepped and  draped in standard sterile surgical fashion along with the Right axilla and upper arm. A time-out was completed verifying correct patient, procedure, site, positioning and equipment prior to beginning the procedure.?  ?  A large elliptical skin incision was made that encompassed the nipple-areola complex and the previous biopsy site on the Right. Flaps were raised in the avascular plane between subcutaneous tissue and breast tissue from the clavicle superiorly, the sternum medially, the anterior rectus sheath inferiorly, and the lateral border of the pectoralis major muscle laterally. Hemostasis was achieved in the flaps. Next, the breast tissue and underlying pectoralis fascia were excised from the pectoralis major muscle, progressing from medial to lateral. At the lateral border of the pectoralis major muscle, the breast tissue was swung laterally and a lateral pedicle identified where breast tissue gave way to fat of axilla. Prior to completely removing the breast tissue the sentinel lymph node biopsy was performed.  ?  The lateral border of the pectoralis of the Right axilla was excised and the axilla was entered. Using a hand-held gamma probe ([employer]) the axilla was assessed for a sentinel lymph node. There were no significant counts noted however there were 3 lymph nodes that were grossly palpable, but not hot nor blue and sent as specimen for frozens.?The lymph nodes were sent as specimen sentinel lymph node #1 and #2 and #3. While in the operating room pathology reported the sentinel lymph nodes as?Positive. Further dissection was undertaken as below. The lateral pedicle was incised, removed and the specimen was marked. Superiorly was a short suture and laterally was a long suture and double suture marked the axillary tail. Intra-operative XR confirmed?presence of biopsy clip in the axillary tail of the breast mastectomy specimen.?  ?  The edge of the pectoralis major muscled was identified and it was used  to dissect up towards the axillary vein. The Pectoralis major muscle was retracted medially to expose the pectoralis minor muscle and allow dissection of Rotters lymph nodes. The clavipectoral fascia was divided at the level of the inferior axillary sheath to expose the underlying fat pad and axillary lymph nodes within the fat. Dissections was carried posteriorly until the axillary vein was visualized. The fatty tissue and nodes were dissected off the chest wall and inferior surface of the the axillary vein. The long thoracic nerve bundle was identified just lateral to and along the chest wall. The thoracodorsal neurovascular bundle was identified in the mid-axilla posteriorly along the anterior surface of the latissimus dorsi muscle. Level I and II axillary lymph nodes were removed and sent as specimen. During the dissection, the intercostobrachial nerves were?transected for adequate exposure to proceed with the axillary lymph node dissection.  ?   The?mastectomy wound bed and axilla were irrigated and suctioned. Hemostasis was confirmed. Two 15 fr round drains were placed; the superior drain was secured with 2-0 silk and situated in the axilla; the inferior drain was secured with 2-0?nylon?and situated in the mastectomy wound bed.?The?wound was?closed with running 3-0 Vicryl sutures. The subdermal layers were closed with 3-0 vicryl.?A 4-0 monocryl subcuticular stitch and skin glue were used to close the skin.?Steri-strips were then applied. Patient tolerated procedure well without complication and was taken to PACU in stable condition. All instruments, lap sponge, and needle counts were correct x2. Dr. Silva was present and scrubbed for the entirety of the operation.  ?  ?  ?   Patient was discussed with the resident. Agree with the above assessment and plan.

## 2022-05-21 NOTE — HISTORICAL [facility] CERNER
This is a historical note converted from [employer]. Formatting and pictures may have been removed.  Please reference Liz for original formatting and attached multimedia. Chief Complaint  Breast Cancer Surveillance  History of Present Illness  Problem List:  1. Stage IIIB (cT4b cN1 M0) Multifocal Infiltrating Ductal Carcinoma of the Right Breast  -Diagnosed: 10/01/2019  -1.2 cm, DCIS present, Right axillary LN positive  -ER positive (93.2%), IN positive (90.3%), HER-2 negative, Ki-67: 15.3% (borderline)  ?   Current Treatment:  1. Lupron monthly. Started 7/28/2020.  ?   2. Arimidex 1mg po daily to start 6-8 weeks after initiation of Lupron (late September).  Started 9/18/2020.  ?   Treatment History:  1. Neoadjuvant DDAC x4 cycles: 12/13/2019-01/24/2020  2. Neoadjuvant Taxol 80 mg/m? IV weekly x12 cycles. Started 2/7/2020. Completed 12 cycles on 4/23/2020.  3. 05/27/2020: Simple right mastectomy, right axillary lymph node dissection  3.? XRT 6/2/2020 to 8/7/2020.  ?   History of present illness:  44-year-old female referred by Dr. Stephanie Silva for breast cancer.  ?   For a full, detailed history, please see the note dated 8/20/2020.  ?   Interval History  4/27/2022: Patient presents today for scheduled follow up on Arimidex; she is scheduled to receive Lupron today. She presents alone and reports adherence to Arimidex; she complains of hot flashes & joint pain, occasional pain to right breast along with right arm lymphedema.??Advised her to try Tylenol arthritis or Motrin or voltaren gel for joint pain and black cohosh or evening primrose for hot flashes.? She has?a lymphedema sleeve and machine; advised her to use it as she states she does not. She also reports itching, patient encouraged to use a good body moisturizer 2-3 times or more if needed. Lab work reviewed with patient, potassium slightly low encouraged to increase potassium rich foods into diet. She is aware of upcoming DEXA scan. She denies any further  questions needs or concerns.  Review of Systems  A complete 12-point ROS was performed in full with pertinent positives as described in interval history. Remainder of ROS done in full and are negative.  ?  Physical Exam  Vitals & Measurements  T:?37.6? ?C (Oral)? HR:?84(Peripheral)? RR:?18? BP:?138/94? SpO2:?99%?  HT:?168.00?cm? WT:?92.890?kg? BMI:?32.91?  Physical Exam:  General: Alert and oriented, No acute distress.?  Appearance: Well-groomed wearing mask  HEENT: Normocephalic, Oral mucosa is moist. Pupils are equal, round and reactive to light, Extraocular movements are intact, Normal conjunctiva.?  Neck: Supple, Non-tender, No lymphadenopathy, No thyromegaly.?  Respiratory: Lungs are clear to auscultation, Respirations are non-labored, Breath sounds are equal, Symmetrical chest wall expansion.?  Cardiovascular: Normal rate, Regular rhythm, No edema.?  Breast: Breast exam not performed on todays visit.?  Gastrointestinal: Rounded, Soft, Non-tender, Non-distended, Normal bowel sounds.?  Musculoskeletal: Normal strength. Ambulates without assistance  Integumentary: Warm, dry, intact.?  Neurologic: Alert, Oriented, No focal deficits, Cranial Nerves II-XII are grossly intact.?  Cognition and Speech: Oriented, Speech clear and coherent.?  Psychiatric: Cooperative, Appropriate mood & affect.?  ECOG Performance Scale:?0 - Capable of all self-care no restrictions  Assessment/Plan  1.?Breast cancer?C50.919  1.?Breast cancer of lower-inner quadrant of right female breast?C50.311  ?#Multifocal infiltrating ductal carcinoma of right breast, status post right axillary lymph node biopsy?(positive: 10/01/2019)  At least?5 breast masses,?multiple?abnormal?lymph nodes?in axilla (mammogram and ultrasound 09/24/2019)  IDC, intermediate grade, at least 1.2 cm, along with DCIS; right axillary lymph node positive (biopsy 10/01/2019)  ER positive (93.2%).? MO positive (90.3%).? HER-2 negative ([facility], FISH testing).? Ki-67 15.3%  (borderline positive)  Left breast masses benign  -->  Orange peel appearance?of skin of right breast,?involving <1/3 of the skin?(physical exam 11/11/2019)  No palpable regional lymphadenopathy?but?right axillary lymph node positive on biopsy?(physical exam?11/11/2019)  -No metastasis?(bone scan and CTs?11/2019)  -LVEF 60% (TTE?11/2019)  -->  cT4b cN1 M0, AJCC anatomic stage IIIB, clinical prognostic stage stage IIIB  -11/25/2019:???Inflammatory breast carcinoma  -->  ?cT4d cN1 M0, AJCC anatomic stage IIIB, clinical prognostic stage stage IIIB  (Biopsy positive?axillary lymph node)  ?ER positive. ?NM positive.? HER-2 negative.  ?-Neoadjuvant DDAC x4 (12/13/2019-01/24/2020), clinically, with good response  -Neoadjuvant weekly Taxol x12 (02/07/2020-04/23/2020)?  ?-05/27/2020: Simple right mastectomy, right axillary lymph node dissection:  Pathology: Total right mastectomy; invasive carcinoma; multiple foci of invasive carcinoma; largest invasive carcinoma, at least 2.0 cm; overall grade 1; DCIS present, positive for extensive intraductal component; size of DCIS, 5 cm, cribriform, clinging, micropapillary, nuclear grade 3, central necrosis; invasive carcinoma margins uninvolved, 3.4 cm; DCIS margins uninvolved, 3.4 cm; number of lymph nodes examined, 20; number of sentinel lymph nodes examined, 3; number of lymph nodes with macro metastasis, 3; number of lymph nodes with micrometastasis, 2; number of lymph nodes with isolated tumor cells, 0; no extranodal extension; no definite response to presurgical therapy in the invasive carcinoma; no definite response to presurgical therapy in the metastatic carcinoma; lymphovascular invasion present  -->???pT2 pN2a?(2.0 cm tumor;?5 lymph nodes positive)?(no definitive response to?neoadjuvant chemotherapy)  2. ?Right axillary SLN #1, excisional biopsy: Negative for carcinoma  3 Right axillary SLN #2, excisional biopsy: 1 lymph node, positive for micrometastasis  4. ?Right  axillary SLN #3, excisional biopsy: 1 lymph node, positive for 1 micrometastasis  5. ?Right axillary lymph node, dissection permanent: 1/15 lymph nodes identified  ?ER positive (60%). ?SD positive (99%). ?HER-2 not overexpressed (score 0). ?Ki-67 low proliferation (2%)  -No metastasis?(CT C/A/P, bone scan: 07/01/2020;?brain MRI: 07/07/2020)  -Normal BMD (DEXA: 07/01/2020)  -11/24/2021: Bilateral ?diagnostic contrast-enhanced?MMG with tiffany: Right Breast: Benign (BI-RADS 2)?Left Breast: Benign (BI-RADS 2)?  ???????-----  ?   #????????Premenopausal as of 11/11/2019 by history  -No menstrual cycles since December 2019?(after starting neoadjuvant chemotherapy)  ----  ?   # ????????Family history of breast cancer and uterine cancers  ?-12/02/2019: Genetic testing:?Three (3) variants of uncertain significance (VUS): AXIN2 c.1573C>G (p.Uzo567Nrr), msh3 c.2005C>T (p.Gvy484Mpl), and POLE c.1007A>G (p.Wos990Lpg)  ----  ?   #Exophytic fibroid versus left adnexal mass (4 cm, solid) on CT 11/22/2019  ?12/03/2019: Pelvic ultrasound: No discrete sonographic pathology  -----  ????  ?Plan:  -Since she has aggressive, node positive disease,?therefore,?for adjuvant endocrine therapy,?she will benefit from?ovarian suppression?plus aromatase inhibitor?therapy.  ?   Recommendation:?Counseling for fertility concerns?if premenopausal; pregnancy test in all women of childbearing potential.  She is already status post tubal ligation.  ?   ?-Significant residual?disease?(2 cm invasive carcinoma, 5 lymph nodes positive)?post neoadjuvant chemotherapy, with no definitive response?to neoadjuvant chemotherapy  -Metastasis have been ruled out  -Needs adjuvant radiation therapy?(already receiving)  -Was premenopausal as of 11/11/2019, before starting neoadjuvant chemotherapy  -Lupron shots monthly  ?   ?No evidence?of disease recurrence. Continue?surveillance.  Continue Lupron monthly;?due?today, 5/25/2022, 6/22/2022, & 7/20/2022.  Follow up in 3  months (F2F on 7/20/2022) with CBC, CMP prior to Lupron.  Annual screening MMG?bilateral?due 11/2022;?Scheduled for 11/29/2022  ?   ?Breast cancer surveillance:  -History and physical exam 1-4 times per year for 5 years, then annually;?  ?  -Mammogram every 12 months, with no clear advantage to shorter interval imaging; patient should wait 6-12 months after the completion of radiation therapy to begin their annual mammogram surveillance; suspicious findings on physical examination or surveillance imaging might warrant a shorter interval between mammograms;?  ?  -Educate, monitor, and referred for lymphedema management  ?  -Women on an aromatase inhibitor or who experience ovarian failure secondary to treatment, should have monitoring of bone health with a bone mineral density determination at baseline and periodically thereafter (the use of a bisphosphonate, oral or IV, or denosumab is acceptable to maintain or to improve bone mineral density and reduce risk of fractures in postmenopausal (natural or induced) patients receiving adjuvant endocrine therapy. ?Optimal duration of therapy has not been established. Duration beyond 3 years is not known. ?Women treated with a bisphosphonate or denosumab should take supplemental calcium and vitamin D.  ?  -Routine imaging of reconstructed breast is not indicated;?  ?  -In the absence of clinical signs or symptoms suggestive of recurrent disease, there is no indication for laboratory or imaging studies for metastasis screening;?  ?  -Active lifestyle, healthy diet, limited alcohol intake, and achieving and maintaining an ideal body weight (20-25 BMI) may lead to optimal breast cancer outcomes.  Ordered:  ?  2.?Long term (current) use of aromatase inhibitors?Z79.811  Needs adjuvant endocrine therapy  For adjuvant endocrine therapy, will treat her with LHRH agonist plus aromatase inhibitor (Arimidex) for 5 years  Arimidex to start?6-8 weeks after initiation of monthly LHRH  agonist shots  Baseline DEXA scan (03/21/2020, with normal BMD?  Normal BMD on baseline?DEXA scan (07/01/2020)  Advised to start taking calcium and vitamin D supplements (calcium 1200 mg daily and vitamin D3 800-1000?units daily)??  ?   Continue Arimidex daily. Prescription sent to patients pharmacy.  Continue calcium + vitamin D supplementation daily.  Repeat?DEXA scan in 2 years (7/2022); Scheduled 7/7/2022  Ordered:  ?  3.?Chemotherapy-induced peripheral neuropathy?G62.0  #Taxol-induced peripheral neuropathy  -Gabapentin started 02/19/2020  -Cymbalta 60 mg p.o. nightly started?03/18/2020?  ?   Continue gabapentin and Cymbalta for Taxol-induced peripheral neuropathy.  Ordered:  ?   Problem List/Past Medical History  Ongoing  Breast cancer  Breast cancer of lower-inner quadrant of right female breast  Chemotherapy-induced peripheral neuropathy  Hypertension  Long term (current) use of aromatase inhibitors  Lymphedema of upper limb  Obesity  Historical  Pregnant  Pregnant  Procedure/Surgical History  Casirivi and imdevi inj (06/30/2021)  Surgery care (05/25/2021)  Axillary Node Dissection (Right) (05/27/2020)  Biopsy or excision of lymph node(s); open, deep axillary node(s) (05/27/2020)  Excision of Right Breast, Open Approach (05/27/2020)  Injection procedure; radioactive tracer for identification of sentinel node (05/27/2020)  Intraoperative identification (eg, mapping) of sentinel lymph node(s) includes injection of non-radioactive dye, when performed (List separately in addition to code for primary procedure) (05/27/2020)  Mastectomy Simple (Right) (05/27/2020)  Mastectomy, simple, complete (05/27/2020)  Planar Nuclear Medicine Imaging of Upper Chest Lymphatics using Other Radionuclide (05/27/2020)  Planar Nuclear Medicine Imaging of Upper Chest Lymphatics using Technetium 99m (Tc-99m) (05/27/2020)  Resection of Right Axillary Lymphatic, Open Approach (05/27/2020)  [facility]ion [facility] (None)  (11/27/2019)  Insertion of Totally Implantable Vascular Access Device into Chest Subcutaneous Tissue and Fascia, Open Approach (11/27/2019)  Insertion of tunneled centrally inserted central venous access device, with subcutaneous port; age 5 years or older (11/27/2019)  Mammogram (2019)  Biopsy of breast  [place]  Tubal ligation   Medications  anastrozole 1 mg oral tablet, 1 mg= 1 tab(s), Oral, Daily, 3 refills  Calcium 1200mg plus Vitamin D3 25mcg, See Instructions  calcium carbonate 600 mg oral tablet, 600 mg= 1 tab(s), Oral, BID,? ?Not taking  cholecalciferol 1000 intl units (25 mcg) oral tablet, 25 mcg= 1 tab(s), Oral, Daily, 11 refills  Colace 100 mg oral capsule, 100 mg= 1 cap(s), Oral, BID, PRN, 5 refills  Cymbalta 60 mg oral delayed release capsule, 60 mg= 1 cap(s), Oral, At Bedtime, 11 refills  gabapentin 400 mg oral capsule, 400 mg= 1 cap(s), Oral, TID, 11 refills  hydrochlorothiazide-lisinopril 12.5 mg-10 mg oral tablet, 1 tab(s), Oral, Daily, 11 refills  ibuprofen 800 mg oral tablet, 800 mg= 1 tab(s), Oral, TID,? ?Not Taking, Completed Rx: PRN  Lupron, 3.75 mg= 1 EA, IM, Once-chemo  Allergies  Bactrim  sulfa drugs?(RASH, DIFFICULTY BREATHIN)  Social History  Abuse/Neglect  No, No, Yes, 04/27/2022  Alcohol - Denies Alcohol Use, 09/01/2014  Current, 1-2 times per month, 01/05/2022  Employment/School  Employed, 11/25/2015  Exercise  Exercise duration: 0., 11/10/2021  Home/Environment  Lives with Children. Living situation: Home/Independent., 11/12/2019    Never in , 11/10/2021  Nutrition/Health  Regular, Fair, 11/12/2019  Sexual  Sexually active: Yes. Number of current partners 1. Sexual orientation: Straight or heterosexual. Gender Identity Identifies as female., 01/02/2020  Spiritual/Cultural  Yazdanism, 12/17/2019  Substance Use - Denies Substance Abuse, 09/01/2014  Never, 01/05/2022  Tobacco - Denies Tobacco Use, 09/01/2014  Never (less than 100 in lifetime), N/A, 04/27/2022  Family  History  Hypertension.: Mother.  Malignant neoplasm of uterus: Negative: Aunt.  Primary malignant neoplasm of breast: Grandmother.  Immunizations  Vaccine Date Status   COVID-19 MRNA, LNP-S, PF- Pfizer 10/22/2021 Given   influenza virus vaccine, inactivated 10/12/2021 Given   COVID-19 MRNA, LNP-S, PF- Pfizer 10/01/2021 Given   influenza virus vaccine, inactivated 09/22/2020 Given   pneumococcal 13-valent conjugate vaccine 03/05/2020 Given   influenza virus vaccine, inactivated 11/12/2019 Given   influenza virus vaccine, inactivated 12/07/2016 Given   tetanus/diphtheria/pertussis, acel(Tdap) 12/07/2016 Given   Health Maintenance  Health Maintenance  ???Pending?(in the next year)  ??? ??OverDue  ??? ? ? ?Alcohol Misuse Screening due??01/02/22??and every 1??year(s)  ??? ??Due In Future?  ??? ? ? ?Hypertension Maintenance-Medication Prescribed not due until??10/11/22??and every 1??year(s)  ??? ? ? ?Hypertension Management-Education not due until??10/11/22??and every 1??year(s)  ??? ? ? ?Obesity Screening not due until??01/01/23??and every 1??year(s)  ??? ? ? ?ADL Screening not due until??02/07/23??and every 1??year(s)  ???Satisfied?(in the past 1 year)  ??? ??Satisfied?  ??? ? ? ?ADL Screening on??02/07/22.??Satisfied by Kassidy Swanson LPN  ??? ? ? ?Alcohol Misuse Screening on??06/25/21.??Satisfied by Malorie Ku LPN  ??? ? ? ?Blood Pressure Screening on??04/27/22.??Satisfied by ESSIE Tafoya Ericka  ??? ? ? ?Body Mass Index Check on??04/27/22.??Satisfied by ESSIE Tafoya Ericka  ??? ? ? ?Cervical Cancer Screening on??11/10/21.??Satisfied by Rut GARCIA CT(ASCP)Irina  ??? ? ? ?Depression Screening on??04/27/22.??Satisfied by ESSIE Tafoya Ericka  ??? ? ? ?Diabetes Screening on??04/27/22.??Satisfied by Shari Corey MT.  ??? ? ? ?Hypertension Management-Blood Pressure on??04/27/22.??Satisfied by ESSIE Tafoya Ericka  ??? ? ? ?Hypertension Management-BMP on??04/27/22.??Satisfied by Shari Corey MT  S.  ??? ? ? ?Hypertension Management-Education on??10/11/21.??Satisfied by Joseph Herr MD??Reason: Expectation Satisfied Elsewhere  ??? ? ? ?Hypertension Maintenance-Medication Prescribed on??10/11/21.??Satisfied by Joseph Herr MD  ??? ? ? ?Influenza Vaccine on??01/05/22.??Satisfied by Marisa Rosenberg  ??? ? ? ?Lipid Screening on??04/27/22.??Satisfied by Madhav CORADO, Shari BROWNING  ??? ? ? ?Obesity Screening on??04/27/22.??Satisfied by ESSIE Tafoya, Merna  ?  Lab Results  Test Name Test Result Date/Time   Sodium Lvl 141 mmol/L 04/27/2022 13:27 CDT   Potassium Lvl 3.4 mmol/L (Low) 04/27/2022 13:27 CDT   Chloride 103 mmol/L 04/27/2022 13:27 CDT   CO2 31 mmol/L (High) 04/27/2022 13:27 CDT   Calcium Lvl 9.7 mg/dL 04/27/2022 13:27 CDT   Glucose Lvl 103 mg/dL (High) 04/27/2022 13:27 CDT   .9 mg/dL 04/27/2022 13:27 CDT   BUN 10.9 mg/dL 04/27/2022 13:27 CDT   Creatinine 0.79 mg/dL 04/27/2022 13:27 CDT   Est Creat Clearance Ser 83.76 mL/min 04/27/2022 14:11 CDT   eGFR- 04/27/2022 13:27 CDT   eGFR-BHAVANI 83 mL/min/1.73 m2 (Low) 04/27/2022 13:27 CDT   Bili Total 0.4 mg/dL 04/27/2022 13:27 CDT   Bili Direct 0.2 mg/dL 04/27/2022 13:27 CDT   Bili Indirect 0.20 mg/dL 04/27/2022 13:27 CDT   AST 26 unit/L 04/27/2022 13:27 CDT   ALT 15 unit/L 04/27/2022 13:27 CDT   Alk Phos 157 unit/L (High) 04/27/2022 13:27 CDT   Total Protein 7.7 gm/dL 04/27/2022 13:27 CDT   Albumin Lvl 3.6 gm/dL 04/27/2022 13:27 CDT   Globulin 4.1 gm/dL (High) 04/27/2022 13:27 CDT   A/G Ratio 0.9 ratio (Low) 04/27/2022 13:27 CDT   Hgb A1c 5.7 % 04/27/2022 13:27 CDT   Chol 192 mg/dL 04/27/2022 13:27 CDT   HDL 67 mg/dL (High) 04/27/2022 13:27 CDT   Trig 136 mg/dL 04/27/2022 13:27 CDT   LDL 98.00 mg/dL 04/27/2022 13:27 CDT   Chol/HDL 3 04/27/2022 13:27 CDT   VLDL 27 04/27/2022 13:27 CDT

## 2022-05-25 ENCOUNTER — INFUSION (OUTPATIENT)
Dept: INFUSION THERAPY | Facility: HOSPITAL | Age: 47
End: 2022-05-25
Attending: INTERNAL MEDICINE
Payer: MEDICAID

## 2022-05-25 VITALS
HEART RATE: 84 BPM | DIASTOLIC BLOOD PRESSURE: 108 MMHG | TEMPERATURE: 98 F | RESPIRATION RATE: 18 BRPM | BODY MASS INDEX: 32.77 KG/M2 | WEIGHT: 203.94 LBS | SYSTOLIC BLOOD PRESSURE: 138 MMHG

## 2022-05-25 DIAGNOSIS — C50.919 MALIGNANT NEOPLASM OF FEMALE BREAST, UNSPECIFIED ESTROGEN RECEPTOR STATUS, UNSPECIFIED LATERALITY, UNSPECIFIED SITE OF BREAST: Primary | ICD-10-CM

## 2022-05-25 PROCEDURE — 96402 CHEMO HORMON ANTINEOPL SQ/IM: CPT

## 2022-05-25 PROCEDURE — 63600175 PHARM REV CODE 636 W HCPCS: Mod: JG | Performed by: INTERNAL MEDICINE

## 2022-05-25 RX ADMIN — LEUPROLIDE ACETATE 3.75 MG: KIT at 02:05

## 2022-05-25 NOTE — NURSING
14:43pm Patient is here for Lupron monthly injection. Voices no c/o.  14:57pm Patient tolerated injection well. AVS provided.

## 2022-06-22 ENCOUNTER — INFUSION (OUTPATIENT)
Dept: INFUSION THERAPY | Facility: HOSPITAL | Age: 47
End: 2022-06-22
Attending: INTERNAL MEDICINE
Payer: MEDICAID

## 2022-06-22 VITALS
SYSTOLIC BLOOD PRESSURE: 137 MMHG | DIASTOLIC BLOOD PRESSURE: 96 MMHG | BODY MASS INDEX: 32.7 KG/M2 | TEMPERATURE: 98 F | WEIGHT: 203.5 LBS | RESPIRATION RATE: 20 BRPM | HEART RATE: 93 BPM

## 2022-06-22 DIAGNOSIS — C50.919 MALIGNANT NEOPLASM OF FEMALE BREAST, UNSPECIFIED ESTROGEN RECEPTOR STATUS, UNSPECIFIED LATERALITY, UNSPECIFIED SITE OF BREAST: Primary | ICD-10-CM

## 2022-06-22 PROCEDURE — 96402 CHEMO HORMON ANTINEOPL SQ/IM: CPT

## 2022-06-22 PROCEDURE — 63600175 PHARM REV CODE 636 W HCPCS: Mod: JG | Performed by: INTERNAL MEDICINE

## 2022-06-22 RX ADMIN — LEUPROLIDE ACETATE 3.75 MG: KIT at 02:06

## 2022-07-07 ENCOUNTER — HOSPITAL ENCOUNTER (OUTPATIENT)
Dept: RADIOLOGY | Facility: HOSPITAL | Age: 47
Discharge: HOME | End: 2022-07-07
Attending: NURSE PRACTITIONER
Payer: MEDICAID

## 2022-07-07 DIAGNOSIS — Z79.811 LONG TERM (CURRENT) USE OF AROMATASE INHIBITORS: ICD-10-CM

## 2022-07-07 PROCEDURE — 77080 DXA BONE DENSITY AXIAL: CPT | Mod: TC

## 2022-07-20 ENCOUNTER — INFUSION (OUTPATIENT)
Dept: INFUSION THERAPY | Facility: HOSPITAL | Age: 47
End: 2022-07-20
Attending: INTERNAL MEDICINE
Payer: MEDICAID

## 2022-07-20 VITALS
DIASTOLIC BLOOD PRESSURE: 82 MMHG | OXYGEN SATURATION: 100 % | BODY MASS INDEX: 32.2 KG/M2 | HEIGHT: 66 IN | TEMPERATURE: 98 F | SYSTOLIC BLOOD PRESSURE: 114 MMHG | WEIGHT: 200.38 LBS | HEART RATE: 94 BPM | RESPIRATION RATE: 20 BRPM

## 2022-07-20 DIAGNOSIS — C50.919 MALIGNANT NEOPLASM OF FEMALE BREAST, UNSPECIFIED ESTROGEN RECEPTOR STATUS, UNSPECIFIED LATERALITY, UNSPECIFIED SITE OF BREAST: Primary | ICD-10-CM

## 2022-07-20 PROCEDURE — 96402 CHEMO HORMON ANTINEOPL SQ/IM: CPT

## 2022-07-20 PROCEDURE — 63600175 PHARM REV CODE 636 W HCPCS: Mod: JG | Performed by: INTERNAL MEDICINE

## 2022-07-20 RX ADMIN — LEUPROLIDE ACETATE 3.75 MG: KIT at 03:07

## 2022-08-03 ENCOUNTER — OFFICE VISIT (OUTPATIENT)
Dept: URGENT CARE | Facility: CLINIC | Age: 47
End: 2022-08-03
Payer: MEDICAID

## 2022-08-03 VITALS
RESPIRATION RATE: 20 BRPM | WEIGHT: 198.19 LBS | TEMPERATURE: 99 F | DIASTOLIC BLOOD PRESSURE: 97 MMHG | HEIGHT: 66 IN | BODY MASS INDEX: 31.85 KG/M2 | HEART RATE: 101 BPM | SYSTOLIC BLOOD PRESSURE: 147 MMHG | OXYGEN SATURATION: 100 %

## 2022-08-03 DIAGNOSIS — U07.1 COVID-19: ICD-10-CM

## 2022-08-03 DIAGNOSIS — U07.1 COVID-19 VIRUS DETECTED: ICD-10-CM

## 2022-08-03 DIAGNOSIS — Z11.52 ENCOUNTER FOR SCREENING FOR COVID-19: Primary | ICD-10-CM

## 2022-08-03 LAB
CTP QC/QA: YES
SARS-COV-2 RDRP RESP QL NAA+PROBE: POSITIVE

## 2022-08-03 PROCEDURE — U0002 COVID-19 LAB TEST NON-CDC: HCPCS | Mod: PBBFAC | Performed by: FAMILY MEDICINE

## 2022-08-03 PROCEDURE — 99213 OFFICE O/P EST LOW 20 MIN: CPT | Mod: S$PBB,,, | Performed by: FAMILY MEDICINE

## 2022-08-03 PROCEDURE — 99214 OFFICE O/P EST MOD 30 MIN: CPT | Mod: PBBFAC | Performed by: FAMILY MEDICINE

## 2022-08-03 PROCEDURE — 99213 PR OFFICE/OUTPT VISIT, EST, LEVL III, 20-29 MIN: ICD-10-PCS | Mod: S$PBB,,, | Performed by: FAMILY MEDICINE

## 2022-08-03 RX ORDER — DULOXETIN HYDROCHLORIDE 60 MG/1
60 CAPSULE, DELAYED RELEASE ORAL
COMMUNITY
Start: 2022-02-07 | End: 2022-08-18 | Stop reason: SDUPTHER

## 2022-08-03 RX ORDER — GABAPENTIN 400 MG/1
400 CAPSULE ORAL
COMMUNITY
Start: 2022-02-07 | End: 2023-09-11 | Stop reason: SDUPTHER

## 2022-08-03 RX ORDER — ONDANSETRON 4 MG/1
TABLET, FILM COATED ORAL
COMMUNITY
Start: 2022-03-23 | End: 2023-03-30 | Stop reason: SDUPTHER

## 2022-08-03 RX ORDER — HYDROXYZINE HYDROCHLORIDE 10 MG/1
10 TABLET, FILM COATED ORAL 3 TIMES DAILY PRN
COMMUNITY
Start: 2022-02-07 | End: 2023-04-18 | Stop reason: SDUPTHER

## 2022-08-03 RX ORDER — CALCIUM CARBONATE 600 MG
600 TABLET ORAL
COMMUNITY

## 2022-08-03 RX ORDER — DOCUSATE SODIUM 100 MG/1
100 CAPSULE, LIQUID FILLED ORAL
COMMUNITY
Start: 2021-07-20 | End: 2023-03-14

## 2022-08-03 RX ORDER — LISINOPRIL AND HYDROCHLOROTHIAZIDE 10; 12.5 MG/1; MG/1
TABLET ORAL
COMMUNITY
Start: 2022-02-07 | End: 2023-09-11 | Stop reason: SDUPTHER

## 2022-08-03 RX ORDER — CHOLECALCIFEROL (VITAMIN D3) 25 MCG
1000 TABLET ORAL DAILY
COMMUNITY
Start: 2022-07-01 | End: 2024-01-31 | Stop reason: SDUPTHER

## 2022-08-03 RX ORDER — ANASTROZOLE 1 MG/1
1 TABLET ORAL
COMMUNITY
Start: 2022-01-05 | End: 2022-08-18 | Stop reason: SDUPTHER

## 2022-08-03 NOTE — LETTER
August 3, 2022      [facility]  [address] [facility] [address]  Phone: [phone]       Patient: Jeni Mejia   YOB: 1975  Date of Visit: 08/03/2022    To Whom It May Concern:    Brionna Mejia  was at [facility] on 08/03/2022. The patient may return to work/school on AUG 9 2022 with no restrictions. If you have any questions or concerns, or if I can be of further assistance, please do not hesitate to contact me.    Sincerely,    LELAND GENTILE MD

## 2022-08-03 NOTE — PROGRESS NOTES
"Subjective:       Patient ID: Jeni Mejia is a 46 y.o. female.    Vitals:  height is 5' 5.75" (1.67 m) and weight is 89.9 kg (198 lb 3.1 oz). Her temperature is 98.8 °F (37.1 °C). Her blood pressure is 147/97 (abnormal) and her pulse is 101. Her respiration is 20 and oxygen saturation is 100%.     Chief Complaint: COVID-19 Concerns (TESTED POSITIVE AT WORK THIS A.M)    HPI   COVID exposure, scratchy throat began this morning.  Minimal cough.  No shortness of breath.  Tested positive at home.  History of breast CA, hypertension  ROS    Constitutional: negative except as stated in HPI  Eye: negative except as stated in HPI  ENT: negative except as stated in HPI  Respiratory: negative except as stated in HPI  Cardiovascular: negative except as stated in HPI  Gastrointestinal: negative except as stated in HPI  Genitourinary: negative except as stated in HPI  Objective:      Physical Exam    GENERAL:  Nontoxic.  Well developed and well nourished.   Appears well hydrated.  No respiratory distress  HEAD:  No signs of head trauma.  EYES:  Pupils are equal.  Extraocular motions intact  NOSE:  No sinus tenderness  NECK:  Supple, nontender, no masses.  Full range of motion without pain.  No meningismus     CHEST:  nontender to palpation, clear breath sounds bilaterally, no wheezes, crackles, or rhonchi.  No increased work of breathing  CARDIOVASCULAR:  Regular    Results for orders placed or performed in visit on 08/03/22   POCT COVID-19 Rapid Screening   Result Value Ref Range    POC Rapid COVID Positive (A) Negative     Acceptable Yes        Assessment:       1. Encounter for screening for COVID-19    2. COVID-19          Plan:         Encounter for screening for COVID-19  -     POCT COVID-19 Rapid Screening    COVID-19    Other orders  -     nirmatrelvir-ritonavir 300 mg (150 mg x 2)-100 mg copackaged tablets (EUA); Take 3 tablets by mouth 2 (two) times daily for 5 days. Each dose contains 2 nirmatrelvir " (pink tablets) and 1 ritonavir (white tablet). Take all 3 tablets together  Dispense: 30 tablet; Refill: 0         COVID risk score is 1. Patient interested in Paxlovid.  No CKD history.  Discussed COVID 19 isolation instructions, contagious precautions.  Use symptomatic medications as needed.  ER precautions.  Please read patient education material.

## 2022-08-04 ENCOUNTER — NURSE TRIAGE (OUTPATIENT)
Dept: ADMINISTRATIVE | Facility: CLINIC | Age: 47
End: 2022-08-04
Payer: MEDICAID

## 2022-08-04 NOTE — TELEPHONE ENCOUNTER
[facility] call -         Pt c/o pos yesterday, sore throat and body and HA, stomach aches, afebrile. Covid protocol followed and pt advised to tx at home. Education provided on covid, isolation, tx of symptoms and when to call ooc or seek further help. Invited Pt to call ooc at [phone] at anytime for nursing advice. Alert and oriented, Pt agrees with above. Unable to route encounter to non ohn pcp.    Reason for Disposition   [1] COVID-19 diagnosed by positive lab test (e.g., PCR, rapid self-test kit) AND [2] mild symptoms (e.g., cough, fever, others) AND [3] no complications or SOB    Additional Information   Negative: SEVERE difficulty breathing (e.g., struggling for each breath, speaks in single words)   Negative: Difficult to awaken or acting confused (e.g., disoriented, slurred speech)   Negative: Bluish (or gray) lips or face now   Negative: Shock suspected (e.g., cold/pale/clammy skin, too weak to stand, low BP, rapid pulse)   Negative: Sounds like a life-threatening emergency to the triager   Negative: SEVERE or constant chest pain or pressure  (Exception: Mild central chest pain, present only when coughing.)   Negative: MODERATE difficulty breathing (e.g., speaks in phrases, SOB even at rest, pulse 100-120)   Negative: Headache and stiff neck (can't touch chin to chest)   Negative: Oxygen level (e.g., pulse oximetry) 90 percent or lower   Negative: Chest pain or pressure   Negative: Patient sounds very sick or weak to the triager   Negative: MILD difficulty breathing (e.g., minimal/no SOB at rest, SOB with walking, pulse <100)   Negative: Fever > 103 F (39.4 C)   Negative: [1] Fever > 101 F (38.3 C) AND [2] over 60 years of age   Negative: [1] Fever > 100.0 F (37.8 C) AND [2] bedridden (e.g., nursing home patient, CVA, chronic illness, recovering from surgery)   Negative: HIGH RISK for severe COVID complications (e.g., weak immune system, age > 64 years, obesity with BMI > 25, pregnant,  chronic lung disease or other chronic medical condition) (Exception: Already seen by PCP and no new or worsening symptoms.)   Negative: [1] HIGH RISK patient AND [2] influenza is widespread in the community AND [3] ONE OR MORE respiratory symptoms: cough, sore throat, runny or stuffy nose   Negative: [1] HIGH RISK patient AND [2] influenza exposure within the last 7 days AND [3] ONE OR MORE respiratory symptoms: cough, sore throat, runny or stuffy nose   Negative: Oxygen level (e.g., pulse oximetry) 91 to 94 percent   Negative: [1] COVID-19 infection suspected by caller or triager AND [2] mild symptoms (cough, fever, or others) AND [3] negative COVID-19 rapid test   Negative: Fever present > 3 days (72 hours)   Negative: [1] Fever returns after gone for over 24 hours AND [2] symptoms worse or not improved   Negative: [1] Continuous (nonstop) coughing interferes with work or school AND [2] no improvement using cough treatment per Care Advice   Negative: Cough present > 3 weeks   Negative: [1] COVID-19 diagnosed by positive lab test (e.g., PCR, rapid self-test kit) AND [2] NO symptoms (e.g., cough, fever, others)    Protocols used: CORONAVIRUS (COVID-19) DIAGNOSED OR [id number]

## 2022-08-08 ENCOUNTER — OFFICE VISIT (OUTPATIENT)
Dept: URGENT CARE | Facility: CLINIC | Age: 47
End: 2022-08-08
Payer: MEDICAID

## 2022-08-08 VITALS
DIASTOLIC BLOOD PRESSURE: 89 MMHG | RESPIRATION RATE: 16 BRPM | WEIGHT: 198.38 LBS | HEART RATE: 78 BPM | OXYGEN SATURATION: 100 % | TEMPERATURE: 99 F | BODY MASS INDEX: 33.05 KG/M2 | SYSTOLIC BLOOD PRESSURE: 128 MMHG | HEIGHT: 65 IN

## 2022-08-08 DIAGNOSIS — Z86.16 HISTORY OF COVID-19: Primary | ICD-10-CM

## 2022-08-08 LAB
CTP QC/QA: YES
SARS-COV-2 RDRP RESP QL NAA+PROBE: POSITIVE

## 2022-08-08 PROCEDURE — 99214 OFFICE O/P EST MOD 30 MIN: CPT | Mod: PBBFAC

## 2022-08-08 PROCEDURE — 99213 PR OFFICE/OUTPT VISIT, EST, LEVL III, 20-29 MIN: ICD-10-PCS | Mod: S$PBB,,,

## 2022-08-08 PROCEDURE — 99213 OFFICE O/P EST LOW 20 MIN: CPT | Mod: S$PBB,,,

## 2022-08-08 PROCEDURE — U0002 COVID-19 LAB TEST NON-CDC: HCPCS | Mod: PBBFAC

## 2022-08-08 NOTE — LETTER
August 8, 2022      [facility]  [address] [address]  Phone: [phone]       Patient: Jeni Mejia   YOB: 1975  Date of Visit: 08/08/2022    To Whom It May Concern:    Brionna Mejia  was at [facility] on 08/08/2022. The patient may return to work/school on 8/9/22. Pt is out of the 5 day quarantine, non symptomatic. May show a + for several weeks after initial test. Proper masking is advised for an additional 5 days. If you have any questions or concerns, or if I can be of further assistance, please do not hesitate to contact me.    Sincerely,    NAM De Guzman NP

## 2022-08-08 NOTE — PROGRESS NOTES
"Subjective:       Patient ID: Jeni Mejia is a 46 y.o. female.    Vitals:  height is 5' 5" (1.651 m) and weight is 90 kg (198 lb 6.4 oz). Her temperature is 98.6 °F (37 °C). Her blood pressure is 128/89 and her pulse is 78. Her respiration is 16 and oxygen saturation is 100%.     Chief Complaint: Needs a negative COVID test for work. (8/3 +, states OTC test yesterday was negative. )    Patient states testing positive for COVID on 8/3. States needs a negative test to return to work. States no longer symptomatic. Denies fever.       Constitution: Negative.   HENT: Negative.    Neck: neck negative.   Cardiovascular: Negative.    Eyes: Negative.    Respiratory: Negative.    Gastrointestinal: Negative.    Endocrine: negative.   Genitourinary: Negative.    Musculoskeletal: Negative.    Skin: Negative.    Neurological: Negative.        Objective:      Physical Exam   HENT:   Head: Normocephalic.   Ears:   Right Ear: Tympanic membrane, external ear and ear canal normal.   Left Ear: Tympanic membrane, external ear and ear canal normal.   Nose: Nose normal.   Mouth/Throat: Uvula is midline, oropharynx is clear and moist and mucous membranes are normal. Mucous membranes are moist. Oropharynx is clear.   Eyes: Pupils are equal, round, and reactive to light.   Cardiovascular: Normal rate, regular rhythm and normal pulses.   Pulmonary/Chest: Effort normal and breath sounds normal.   Abdominal: Normal appearance. Soft.   Musculoskeletal: Normal range of motion.         General: Normal range of motion.   Neurological: She is alert.   Skin: Skin is warm and dry.   Vitals reviewed.    Results for orders placed or performed in visit on 08/08/22   POCT COVID-19 Rapid Screening   Result Value Ref Range    POC Rapid COVID Positive (A) Negative     Acceptable Yes            Assessment:       1. History of COVID-19          Plan:         History of COVID-19  -     POCT COVID-19 Rapid Screening         Please see " provided patient education for guidance.  I recommend a 7 day quarantine from day of symptom onset. The CDC permits 5 days. If you choose to quarantine for 5 days, wear a mask when around other people and in public through day 8.    Go to the ER if you experience chest pain with SOB, SOBOE, high fevers 103+, excessive vomiting/diarrhea, or general distress.        Please see provided patient education for guidance.    .

## 2022-08-18 ENCOUNTER — INFUSION (OUTPATIENT)
Dept: INFUSION THERAPY | Facility: HOSPITAL | Age: 47
End: 2022-08-18
Attending: INTERNAL MEDICINE
Payer: MEDICAID

## 2022-08-18 ENCOUNTER — OFFICE VISIT (OUTPATIENT)
Dept: HEMATOLOGY/ONCOLOGY | Facility: CLINIC | Age: 47
End: 2022-08-18
Payer: MEDICAID

## 2022-08-18 VITALS
DIASTOLIC BLOOD PRESSURE: 90 MMHG | RESPIRATION RATE: 20 BRPM | WEIGHT: 197.56 LBS | HEIGHT: 65 IN | BODY MASS INDEX: 32.91 KG/M2 | SYSTOLIC BLOOD PRESSURE: 136 MMHG | HEART RATE: 90 BPM | TEMPERATURE: 98 F | OXYGEN SATURATION: 100 %

## 2022-08-18 DIAGNOSIS — C50.911 INFILTRATING DUCTAL CARCINOMA OF BREAST, RIGHT: Primary | ICD-10-CM

## 2022-08-18 DIAGNOSIS — C50.919 MALIGNANT NEOPLASM OF FEMALE BREAST, UNSPECIFIED ESTROGEN RECEPTOR STATUS, UNSPECIFIED LATERALITY, UNSPECIFIED SITE OF BREAST: ICD-10-CM

## 2022-08-18 DIAGNOSIS — C50.919 MALIGNANT NEOPLASM OF FEMALE BREAST, UNSPECIFIED ESTROGEN RECEPTOR STATUS, UNSPECIFIED LATERALITY, UNSPECIFIED SITE OF BREAST: Primary | ICD-10-CM

## 2022-08-18 LAB
ALBUMIN SERPL-MCNC: 3.8 GM/DL (ref 3.5–5)
ALBUMIN/GLOB SERPL: 0.9 RATIO (ref 1.1–2)
ALP SERPL-CCNC: 139 UNIT/L (ref 40–150)
ALT SERPL-CCNC: 19 UNIT/L (ref 0–55)
AST SERPL-CCNC: 19 UNIT/L (ref 5–34)
BASOPHILS # BLD AUTO: 0.03 X10(3)/MCL (ref 0–0.2)
BASOPHILS NFR BLD AUTO: 0.4 %
BILIRUBIN DIRECT+TOT PNL SERPL-MCNC: 0.4 MG/DL
BUN SERPL-MCNC: 15.8 MG/DL (ref 7–18.7)
CALCIUM SERPL-MCNC: 10.1 MG/DL (ref 8.4–10.2)
CHLORIDE SERPL-SCNC: 102 MMOL/L (ref 98–107)
CO2 SERPL-SCNC: 33 MMOL/L (ref 22–29)
CREAT SERPL-MCNC: 0.81 MG/DL (ref 0.55–1.02)
EOSINOPHIL # BLD AUTO: 0.2 X10(3)/MCL (ref 0–0.9)
EOSINOPHIL NFR BLD AUTO: 3 %
ERYTHROCYTE [DISTWIDTH] IN BLOOD BY AUTOMATED COUNT: 12.1 % (ref 11.5–17)
GFR SERPLBLD CREATININE-BSD FMLA CKD-EPI: >60 MLS/MIN/1.73/M2
GLOBULIN SER-MCNC: 4.1 GM/DL (ref 2.4–3.5)
GLUCOSE SERPL-MCNC: 152 MG/DL (ref 74–100)
HCT VFR BLD AUTO: 38.1 % (ref 37–47)
HGB BLD-MCNC: 12.7 GM/DL (ref 12–16)
IMM GRANULOCYTES # BLD AUTO: 0.03 X10(3)/MCL (ref 0–0.04)
IMM GRANULOCYTES NFR BLD AUTO: 0.4 %
LYMPHOCYTES # BLD AUTO: 3.01 X10(3)/MCL (ref 0.6–4.6)
LYMPHOCYTES NFR BLD AUTO: 45 %
MCH RBC QN AUTO: 31.6 PG (ref 27–31)
MCHC RBC AUTO-ENTMCNC: 33.3 MG/DL (ref 33–36)
MCV RBC AUTO: 94.8 FL (ref 80–94)
MONOCYTES # BLD AUTO: 0.38 X10(3)/MCL (ref 0.1–1.3)
MONOCYTES NFR BLD AUTO: 5.7 %
NEUTROPHILS # BLD AUTO: 3 X10(3)/MCL (ref 2.1–9.2)
NEUTROPHILS NFR BLD AUTO: 45.5 %
NRBC BLD AUTO-RTO: 0 %
PLATELET # BLD AUTO: 275 X10(3)/MCL (ref 130–400)
PMV BLD AUTO: 10.7 FL (ref 7.4–10.4)
POTASSIUM SERPL-SCNC: 3.3 MMOL/L (ref 3.5–5.1)
PROT SERPL-MCNC: 7.9 GM/DL (ref 6.4–8.3)
RBC # BLD AUTO: 4.02 X10(6)/MCL (ref 4.2–5.4)
SODIUM SERPL-SCNC: 143 MMOL/L (ref 136–145)
WBC # SPEC AUTO: 6.7 X10(3)/MCL (ref 4.5–11.5)

## 2022-08-18 PROCEDURE — 96402 CHEMO HORMON ANTINEOPL SQ/IM: CPT

## 2022-08-18 PROCEDURE — 3080F DIAST BP >= 90 MM HG: CPT | Mod: CPTII,,, | Performed by: NURSE PRACTITIONER

## 2022-08-18 PROCEDURE — 63600175 PHARM REV CODE 636 W HCPCS: Mod: JG | Performed by: INTERNAL MEDICINE

## 2022-08-18 PROCEDURE — 3008F PR BODY MASS INDEX (BMI) DOCUMENTED: ICD-10-PCS | Mod: CPTII,,, | Performed by: NURSE PRACTITIONER

## 2022-08-18 PROCEDURE — 3075F PR MOST RECENT SYSTOLIC BLOOD PRESS GE 130-139MM HG: ICD-10-PCS | Mod: CPTII,,, | Performed by: NURSE PRACTITIONER

## 2022-08-18 PROCEDURE — 4010F ACE/ARB THERAPY RXD/TAKEN: CPT | Mod: CPTII,,, | Performed by: NURSE PRACTITIONER

## 2022-08-18 PROCEDURE — 3080F PR MOST RECENT DIASTOLIC BLOOD PRESSURE >= 90 MM HG: ICD-10-PCS | Mod: CPTII,,, | Performed by: NURSE PRACTITIONER

## 2022-08-18 PROCEDURE — 36415 COLL VENOUS BLD VENIPUNCTURE: CPT

## 2022-08-18 PROCEDURE — 1159F PR MEDICATION LIST DOCUMENTED IN MEDICAL RECORD: ICD-10-PCS | Mod: CPTII,,, | Performed by: NURSE PRACTITIONER

## 2022-08-18 PROCEDURE — 3008F BODY MASS INDEX DOCD: CPT | Mod: CPTII,,, | Performed by: NURSE PRACTITIONER

## 2022-08-18 PROCEDURE — 99214 PR OFFICE/OUTPT VISIT, EST, LEVL IV, 30-39 MIN: ICD-10-PCS | Mod: S$PBB,,, | Performed by: NURSE PRACTITIONER

## 2022-08-18 PROCEDURE — 1159F MED LIST DOCD IN RCRD: CPT | Mod: CPTII,,, | Performed by: NURSE PRACTITIONER

## 2022-08-18 PROCEDURE — 1160F PR REVIEW ALL MEDS BY PRESCRIBER/CLIN PHARMACIST DOCUMENTED: ICD-10-PCS | Mod: CPTII,,, | Performed by: NURSE PRACTITIONER

## 2022-08-18 PROCEDURE — 1160F RVW MEDS BY RX/DR IN RCRD: CPT | Mod: CPTII,,, | Performed by: NURSE PRACTITIONER

## 2022-08-18 PROCEDURE — 4010F PR ACE/ARB THEARPY RXD/TAKEN: ICD-10-PCS | Mod: CPTII,,, | Performed by: NURSE PRACTITIONER

## 2022-08-18 PROCEDURE — 3075F SYST BP GE 130 - 139MM HG: CPT | Mod: CPTII,,, | Performed by: NURSE PRACTITIONER

## 2022-08-18 PROCEDURE — 99214 OFFICE O/P EST MOD 30 MIN: CPT | Mod: S$PBB,,, | Performed by: NURSE PRACTITIONER

## 2022-08-18 PROCEDURE — 99214 OFFICE O/P EST MOD 30 MIN: CPT | Mod: 25,PBBFAC | Performed by: NURSE PRACTITIONER

## 2022-08-18 RX ORDER — DULOXETIN HYDROCHLORIDE 60 MG/1
60 CAPSULE, DELAYED RELEASE ORAL NIGHTLY
Qty: 30 CAPSULE | Refills: 4 | Status: SHIPPED | OUTPATIENT
Start: 2022-08-18 | End: 2023-09-11 | Stop reason: SDUPTHER

## 2022-08-18 RX ORDER — ANASTROZOLE 1 MG/1
1 TABLET ORAL DAILY
Qty: 30 TABLET | Refills: 4 | Status: SHIPPED | OUTPATIENT
Start: 2022-08-18 | End: 2022-11-10 | Stop reason: SDUPTHER

## 2022-08-18 RX ADMIN — LEUPROLIDE ACETATE 3.75 MG: KIT at 02:08

## 2022-08-18 NOTE — PROGRESS NOTES
Potassium 3.3, low.      Check magnesium level.    Start potassium chloride 20 mEq p.o. q.day.    In 2 weeks, recheck CMP and magnesium level.

## 2022-08-18 NOTE — Clinical Note
Infusion today for luproin injection Lupron injection with lab work infusion only on 9/15, 10/13, 11/10 Follow up with np in 3 months with lab work + infusion Lupron injection 11/10/2022

## 2022-08-18 NOTE — NURSING
Pt did bloodwork, saw provider, and here for lupron injection.  Pt denies any pain.  Does state she is tired.

## 2022-08-18 NOTE — PROGRESS NOTES
Problem List:  1. 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 Lupron monthly. Started 7/28/2020.    2. Arimidex 1mg po daily to start 6-8 weeks after initiation of Lupron (late September).  Started 9/18/2020.    Treatment History:  1. Neoadjuvant DDAC x4 cycles: 12/13/2019-01/24/2020  2. Neoadjuvant Taxol 80 mg/m² IV weekly x12 cycles. Started 2/7/2020. Completed 12 cycles on 4/23/2020.  3. 05/27/2020: Simple right mastectomy, right axillary lymph node dissection  3. XRT 6/2/2020 to 8/7/2020.      History of Present Illness:  44-year-old female referred by Dr. Stephanie Silva for breast cancer.    Interval History 8/18/2022: Patient presents alone for scheduled follow breast cancer surveillance visit. She reports doing well however does experience some fatigue and trouble swallowing along with forgetfulness. She is UTD with DEXA scan and mammogram. Her last DEXA scan completed in July showed WNL however had decreased. Patient voiced that she does go walking just about everyday for exercise. She is aware of upcoming mammogram in November. She is due for Lupron injection today. She reports adherence to Arimidex, Calcium +Vitamin D daily. Lab work reviewed with patient, stable. No further questions needs or concerns voiced.       Review of Systems: A complete 12-point ROS was performed in full with pertinent positives as described in interval history. Remainder of ROS done in full and are negative.    Lab Results   Component Value Date    WBC 6.7 08/18/2022    HGB 12.7 08/18/2022    HCT 38.1 08/18/2022    MCV 94.8 (H) 08/18/2022     08/18/2022       CMP  Sodium Level   Date Value Ref Range Status   08/18/2022 143 136 - 145 mmol/L Final     Potassium Level   Date Value Ref Range Status    08/18/2022 3.3 (L) 3.5 - 5.1 mmol/L Final     Carbon Dioxide   Date Value Ref Range Status   08/18/2022 33 (H) 22 - 29 mmol/L Final     Blood Urea Nitrogen   Date Value Ref Range Status   08/18/2022 15.8 7.0 - 18.7 mg/dL Final     Creatinine   Date Value Ref Range Status   08/18/2022 0.81 0.55 - 1.02 mg/dL Final     Calcium Level Total   Date Value Ref Range Status   08/18/2022 10.1 8.4 - 10.2 mg/dL Final     Albumin Level   Date Value Ref Range Status   08/18/2022 3.8 3.5 - 5.0 gm/dL Final     Bilirubin Total   Date Value Ref Range Status   08/18/2022 0.4 <=1.5 mg/dL Final     Alkaline Phosphatase   Date Value Ref Range Status   08/18/2022 139 40 - 150 unit/L Final     Aspartate Aminotransferase   Date Value Ref Range Status   08/18/2022 19 5 - 34 unit/L Final     Alanine Aminotransferase   Date Value Ref Range Status   08/18/2022 19 0 - 55 unit/L Final     Estimated GFR-Non    Date Value Ref Range Status   04/27/2022 83 >=90        Physical Exam    Vitals & Measurements  Vitals:    08/18/22 1347   BP: (!) 136/90   Pulse: 90   Resp: 20   Temp: 98.1 °F (36.7 °C)         General: Alert and oriented. NAD  Eye: Pupils are equal, round and reactive to light, Extraocular movements are intact. Normal conjunctiva  HENT: Normocephalic. Oropharynx exam deferred; mask in place due to coronavirus  Neck: Supple, Non-tender  Respiratory: Respirations are non-labored, Symmetrical chest wall expansion. Breath sounds CTA bilaterally  Cardiovascular: Regular rate, rhythm, Normal peripheral perfusion, No bilateral lower extremity edema  Gastrointestinal: Non-distended, Present bowel sounds   Genitourinary: Exam deferred  Lymphatics: No lymphadenopathy appreciated  Musculoskeletal: Moves all extremities  Integumentary: Intact. Warm, dry. No rashes, or lesions to visible skin  Neurologic: No focal deficits  Psychiatric: Cooperative. Appropriate mood and affect   ECOG Performance Scale: 0 - Capable of all self-care no  restrictions    Assessment:  1. Breast cancer C50.919 1. Breast cancer of lower-inner quadrant of right female breast C50.311  #Multifocal infiltrating ductal carcinoma of right breast, status post right axillary lymph node biopsy (positive: 10/01/2019)  At least 5 breast masses, multiple abnormal lymph nodes in axilla (mammogram and ultrasound 09/24/2019)  IDC, intermediate grade, at least 1.2 cm, along with DCIS; right axillary lymph node positive (biopsy 10/01/2019)  ER positive (93.2%). [place] positive (90.3%). HER-2 negative ([facility], FISH testing). 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 [place] positive. HER-2 negative.  -Neoadjuvant DDAC x4 (12/13/2019-01/24/2020), clinically, with good response  -Neoadjuvant weekly Taxol x12 (02/07/2020-04/23/2020)   -05/27/2020: Simple right mastectomy, right axillary lymph node dissection:  Pathology: Total right mastectomy; invasive carcinoma; multiple foci of invasive carcinoma; largest invasive carcinoma, at least 2.0 cm; overall grade 1; DCIS present, positive for extensive intraductal component; size of DCIS, 5 cm, cribriform, clinging, micropapillary, nuclear grade 3, central necrosis; invasive carcinoma margins uninvolved, 3.4 cm; DCIS margins uninvolved, 3.4 cm; number of lymph nodes examined, 20; number of sentinel lymph nodes examined, 3; number of lymph nodes with macro metastasis, 3; number of lymph nodes with  micrometastasis, 2; number of lymph nodes with isolated tumor cells, 0; no extranodal extension; no definite response to presurgical therapy in the invasive carcinoma; no definite response to presurgical therapy in the metastatic carcinoma; lymphovascular invasion present  --> pT2 pN2a (2.0 cm tumor; 5 lymph nodes positive) (no definitive response to neoadjuvant chemotherapy)  2. Right axillary SLN #1, excisional biopsy: Negative for carcinoma  3 Right axillary SLN #2, excisional biopsy: 1 lymph node, positive for micrometastasis  4. Right axillary SLN #3, excisional biopsy: 1 lymph node, positive for 1 micrometastasis  5. Right axillary lymph node, dissection permanent: 1/15 lymph nodes identified  ER positive (60%). MO positive (99%). HER-2 not overexpressed (score 0). Ki-67 low proliferation (2%)  -No metastasis (CT C/A/P, bone scan: 07/01/2020; brain MRI: 07/07/2020)  -Normal BMD (DEXA: 07/01/2020)  -11/24/2021: Bilateral diagnostic contrast-enhanced MMG with tiffany: Right Breast: Benign (BI-RADS 2) Left Breast: Benign (BI-RADS 2)   -----    # Premenopausal as of 11/11/2019 by history  -No menstrual cycles since December 2019 (after starting neoadjuvant chemotherapy)  ----    # Family history of breast cancer and uterine cancers  -12/02/2019: Genetic testing: Three (3) variants of uncertain significance (VUS): AXIN2 c.1573C>G (p.[id number]), msh3 c.2005C>T (p.Lke406Jls), and POLE c.1007A>G (p.Ujm849Vtp)  ----    #Exophytic fibroid versus left adnexal mass (4 cm, solid) on CT 11/22/2019 12/03/2019: Pelvic ultrasound: No discrete sonographic pathology    Plan:  -Since she has aggressive, node positive disease, therefore, for adjuvant endocrine therapy, she will benefit from ovarian suppression plus aromatase inhibitor therapy.    Recommendation: Counseling for fertility concerns if premenopausal; pregnancy test in all women of childbearing potential.  She is already status post tubal ligation.    -Significant  residual disease (2 cm invasive carcinoma, 5 lymph nodes positive) post neoadjuvant chemotherapy, with no definitive response to neoadjuvant chemotherapy  -Metastasis have been ruled out  -Needs adjuvant radiation therapy completed  -Was premenopausal as of 11/11/2019, before starting neoadjuvant chemotherapy  -Lupron shots monthly    No evidence of disease recurrence. Continue surveillance.  Continue Lupron monthly; due today, 9/15/2022, 10/13/2022, & 11/10/2022.  Follow up in 3 months (F2F on 11/10/2022) with CBC, CMP prior to Lupron.  Annual screening MMG bilateral due 11/2022; Scheduled for 11/29/2022    Breast cancer surveillance:  -History and physical exam 1-4 times per year for 5 years, then annually;     -Mammogram every 12 months, with no clear advantage to shorter interval imaging; patient should wait 6-12 months after the completion of radiation therapy to begin their annual mammogram surveillance; suspicious findings on physical examination or surveillance imaging might warrant a shorter interval between mammograms;     -Educate, monitor, and referred for lymphedema management    -Women on an aromatase inhibitor or who experience ovarian failure secondary to treatment, should have monitoring of bone health with a bone mineral density determination at baseline and periodically thereafter (the use of a bisphosphonate, oral or IV, or denosumab is acceptable to maintain or to improve bone mineral density and reduce risk of fractures in postmenopausal (natural or induced) patients receiving adjuvant endocrine therapy. Optimal duration of therapy has not been established. Duration beyond 3 years is not known. Women treated with a bisphosphonate or denosumab should take supplemental calcium and vitamin D.    -Routine imaging of reconstructed breast is not indicated;     -In the absence of clinical signs or symptoms suggestive of recurrent disease, there is no indication for laboratory or imaging studies for  metastasis screening;     -Active lifestyle, healthy diet, limited alcohol intake, and achieving and maintaining an ideal body weight (20-25 BMI) may lead to optimal breast cancer outcomes.     2. Long term (current) use of aromatase inhibitors AREN Needs adjuvant endocrine therapy  For adjuvant endocrine therapy, will treat her with LHRH agonist plus aromatase inhibitor (Arimidex) for 5 years  Arimidex to start 6-8 weeks after initiation of monthly LHRH agonist shots  Baseline DEXA scan (03/21/2020, with normal BMD   Normal BMD on baseline DEXA scan (07/01/2020)  7/7/2022 DEXA normal but showed decreased density  Advised to start taking calcium and vitamin D supplements (calcium 1200 mg daily and vitamin D3 800-1000 units daily)     Continue Arimidex daily.refills sent to pharmacy  Continue calcium + vitamin D supplementation daily.  Repeat DEXA scan in 2 years (7/2022); Scheduled 7/7/2022     3. 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 nightly started 03/18/2020     Continue gabapentin and Cymbalta for Taxol-induced peripheral neuropathy.

## 2022-08-27 ENCOUNTER — HOSPITAL ENCOUNTER (EMERGENCY)
Facility: HOSPITAL | Age: 47
Discharge: HOME | End: 2022-08-27
Attending: INTERNAL MEDICINE
Payer: MEDICAID

## 2022-08-27 VITALS
BODY MASS INDEX: 31.76 KG/M2 | SYSTOLIC BLOOD PRESSURE: 145 MMHG | HEART RATE: 69 BPM | RESPIRATION RATE: 16 BRPM | OXYGEN SATURATION: 99 % | WEIGHT: 197.63 LBS | TEMPERATURE: 98 F | DIASTOLIC BLOOD PRESSURE: 90 MMHG | HEIGHT: 66 IN

## 2022-08-27 DIAGNOSIS — N30.00 ACUTE CYSTITIS WITHOUT HEMATURIA: Primary | ICD-10-CM

## 2022-08-27 LAB
ALBUMIN SERPL-MCNC: 3.8 GM/DL (ref 3.5–5)
ALBUMIN/GLOB SERPL: 1 RATIO (ref 1.1–2)
ALP SERPL-CCNC: 138 UNIT/L (ref 40–150)
ALT SERPL-CCNC: 14 UNIT/L (ref 0–55)
APPEARANCE UR: ABNORMAL
AST SERPL-CCNC: 18 UNIT/L (ref 5–34)
BACTERIA #/AREA URNS AUTO: ABNORMAL /HPF
BASOPHILS # BLD AUTO: 0.02 X10(3)/MCL (ref 0–0.2)
BASOPHILS NFR BLD AUTO: 0.4 %
BILIRUB UR QL STRIP.AUTO: NEGATIVE MG/DL
BILIRUBIN DIRECT+TOT PNL SERPL-MCNC: 0.6 MG/DL
BUN SERPL-MCNC: 9.9 MG/DL (ref 7–18.7)
CALCIUM SERPL-MCNC: 10.2 MG/DL (ref 8.4–10.2)
CHLORIDE SERPL-SCNC: 105 MMOL/L (ref 98–107)
CO2 SERPL-SCNC: 34 MMOL/L (ref 22–29)
COLOR UR AUTO: YELLOW
CREAT SERPL-MCNC: 0.81 MG/DL (ref 0.55–1.02)
EOSINOPHIL # BLD AUTO: 0.18 X10(3)/MCL (ref 0–0.9)
EOSINOPHIL NFR BLD AUTO: 3.2 %
ERYTHROCYTE [DISTWIDTH] IN BLOOD BY AUTOMATED COUNT: 12.1 % (ref 11.5–17)
GFR SERPLBLD CREATININE-BSD FMLA CKD-EPI: >60 MLS/MIN/1.73/M2
GLOBULIN SER-MCNC: 3.8 GM/DL (ref 2.4–3.5)
GLUCOSE SERPL-MCNC: 90 MG/DL (ref 74–100)
GLUCOSE UR QL STRIP.AUTO: NORMAL MG/DL
HCT VFR BLD AUTO: 40.5 % (ref 37–47)
HGB BLD-MCNC: 13.5 GM/DL (ref 12–16)
HYALINE CASTS #/AREA URNS LPF: ABNORMAL /LPF
IMM GRANULOCYTES # BLD AUTO: 0.02 X10(3)/MCL (ref 0–0.04)
IMM GRANULOCYTES NFR BLD AUTO: 0.4 %
KETONES UR QL STRIP.AUTO: NEGATIVE MG/DL
LEUKOCYTE ESTERASE UR QL STRIP.AUTO: 250 UNIT/L
LYMPHOCYTES # BLD AUTO: 2.56 X10(3)/MCL (ref 0.6–4.6)
LYMPHOCYTES NFR BLD AUTO: 45.3 %
MCH RBC QN AUTO: 30.5 PG (ref 27–31)
MCHC RBC AUTO-ENTMCNC: 33.3 MG/DL (ref 33–36)
MCV RBC AUTO: 91.6 FL (ref 80–94)
MONOCYTES # BLD AUTO: 0.37 X10(3)/MCL (ref 0.1–1.3)
MONOCYTES NFR BLD AUTO: 6.5 %
MUCOUS THREADS URNS QL MICRO: ABNORMAL /LPF
NEUTROPHILS # BLD AUTO: 2.5 X10(3)/MCL (ref 2.1–9.2)
NEUTROPHILS NFR BLD AUTO: 44.2 %
NITRITE UR QL STRIP.AUTO: NEGATIVE
NRBC BLD AUTO-RTO: 0 %
PH UR STRIP.AUTO: 7 [PH]
PLATELET # BLD AUTO: 255 X10(3)/MCL (ref 130–400)
PMV BLD AUTO: 10.4 FL (ref 7.4–10.4)
POTASSIUM SERPL-SCNC: 3.8 MMOL/L (ref 3.5–5.1)
PROT SERPL-MCNC: 7.6 GM/DL (ref 6.4–8.3)
PROT UR QL STRIP.AUTO: NEGATIVE MG/DL
RBC # BLD AUTO: 4.42 X10(6)/MCL (ref 4.2–5.4)
RBC #/AREA URNS AUTO: ABNORMAL /HPF
RBC UR QL AUTO: NEGATIVE UNIT/L
SODIUM SERPL-SCNC: 145 MMOL/L (ref 136–145)
SP GR UR STRIP.AUTO: 1.02
SQUAMOUS #/AREA URNS LPF: ABNORMAL /HPF
UROBILINOGEN UR STRIP-ACNC: NORMAL MG/DL
WBC # SPEC AUTO: 5.7 X10(3)/MCL (ref 4.5–11.5)
WBC #/AREA URNS AUTO: ABNORMAL /HPF

## 2022-08-27 PROCEDURE — 81001 URINALYSIS AUTO W/SCOPE: CPT | Performed by: INTERNAL MEDICINE

## 2022-08-27 PROCEDURE — 80053 COMPREHEN METABOLIC PANEL: CPT | Performed by: INTERNAL MEDICINE

## 2022-08-27 PROCEDURE — 99284 EMERGENCY DEPT VISIT MOD MDM: CPT | Mod: 25

## 2022-08-27 PROCEDURE — 85025 COMPLETE CBC W/AUTO DIFF WBC: CPT | Performed by: INTERNAL MEDICINE

## 2022-08-27 PROCEDURE — 36415 COLL VENOUS BLD VENIPUNCTURE: CPT | Performed by: INTERNAL MEDICINE

## 2022-08-27 RX ORDER — DICLOFENAC SODIUM 50 MG/1
50 TABLET, DELAYED RELEASE ORAL 2 TIMES DAILY PRN
Qty: 15 TABLET | Refills: 0 | Status: SHIPPED | OUTPATIENT
Start: 2022-08-27 | End: 2022-08-27 | Stop reason: SDUPTHER

## 2022-08-27 RX ORDER — DICLOFENAC SODIUM 50 MG/1
50 TABLET, DELAYED RELEASE ORAL 2 TIMES DAILY PRN
Qty: 15 TABLET | Refills: 0 | Status: SHIPPED | OUTPATIENT
Start: 2022-08-27 | End: 2023-03-30

## 2022-08-27 RX ORDER — NITROFURANTOIN 25; 75 MG/1; MG/1
100 CAPSULE ORAL 2 TIMES DAILY
Qty: 10 CAPSULE | Refills: 0 | Status: SHIPPED | OUTPATIENT
Start: 2022-08-27 | End: 2022-08-27 | Stop reason: SDUPTHER

## 2022-08-27 RX ORDER — NITROFURANTOIN 25; 75 MG/1; MG/1
100 CAPSULE ORAL 2 TIMES DAILY
Qty: 10 CAPSULE | Refills: 0 | Status: SHIPPED | OUTPATIENT
Start: 2022-08-27 | End: 2022-09-01

## 2022-08-27 NOTE — ED PROVIDER NOTES
Encounter Date: 8/27/2022       History     Chief Complaint   Patient presents with    Flank Pain     Pt c/o left flank pain that radiates to her abdomen and back x 1 week.     Presents with left flank pain for 1 week. Hx of breast CA in chemotherapy.    The history is provided by the patient.   Abdominal Pain  The current episode started several days ago. The abdominal pain is located in the left flank. The abdominal pain radiates to the LLQ. The severity of the abdominal pain is 7/10. The abdominal pain is relieved by nothing. The other symptoms of the illness do not include fever, jaundice, melena, shortness of breath, nausea, vomiting, dysuria, hematemesis, hematochezia, vaginal discharge or vaginal bleeding.   The patient states that she believes she is currently not pregnant. The patient has not had a change in bowel habit. Additional symptoms associated with the illness include back pain. Symptoms associated with the illness do not include urgency, hematuria or frequency. Significant associated medical issues do not include diabetes, gallstones or diverticulitis.   Review of patient's allergies indicates:   Allergen Reactions    Sulfa (sulfonamide antibiotics) Itching     Other reaction(s): RASH, DIFFICULTY BREATHIN    Sulfamethoxazole-trimethoprim Itching and Rash     rash       Past Medical History:   Diagnosis Date    Breast cancer     HTN (hypertension)      Past Surgical History:   Procedure Laterality Date    AUGMENTATION OF BREAST Left     BREAST SURGERY Right     tram flap of rt breast    MASTECTOMY Right     TUBAL LIGATION       Family History   Problem Relation Age of Onset    Hypertension Mother     No Known Problems Father      Social History     Tobacco Use    Smoking status: Never    Smokeless tobacco: Never   Substance Use Topics    Alcohol use: Never    Drug use: Never     Review of Systems   Constitutional:  Negative for fever.   HENT:  Negative for sore throat.    Respiratory:  Negative for  shortness of breath.    Cardiovascular:  Negative for chest pain.   Gastrointestinal:  Positive for abdominal pain. Negative for hematemesis, hematochezia, jaundice, melena, nausea and vomiting.   Genitourinary:  Negative for dysuria, frequency, hematuria, urgency, vaginal bleeding and vaginal discharge.   Musculoskeletal:  Positive for back pain.   Skin:  Negative for rash.   Neurological:  Negative for weakness.   Hematological:  Does not bruise/bleed easily.   All other systems reviewed and are negative.    Physical Exam     Initial Vitals [08/27/22 0925]   BP Pulse Resp Temp SpO2   (!) 137/90 85 18 98.1 °F (36.7 °C) 99 %      MAP       --         Physical Exam    Nursing note and vitals reviewed.  Constitutional: She appears well-developed and well-nourished. No distress.   HENT:   Head: Normocephalic and atraumatic.   Mouth/Throat: Oropharynx is clear and moist.   Eyes: Conjunctivae are normal. Pupils are equal, round, and reactive to light.   Neck: Neck supple.   Normal range of motion.  Cardiovascular:  Normal rate, regular rhythm, normal heart sounds and intact distal pulses.           Pulmonary/Chest: Breath sounds normal.   Abdominal: Abdomen is soft. Bowel sounds are normal. She exhibits no distension. There is no abdominal tenderness.   Lt CVT present There is no rebound and no guarding.   Musculoskeletal:         General: No edema. Normal range of motion.      Cervical back: Normal range of motion and neck supple.     Neurological: She is alert and oriented to person, place, and time. She has normal strength.   Skin: Skin is warm and dry. No rash noted.   Psychiatric: Her behavior is normal.       ED Course   Procedures  Labs Reviewed   URINALYSIS - Abnormal; Notable for the following components:       Result Value    Appearance, UA Turbid (*)     Leukocyte Esterase,  (*)     Bacteria, UA Trace (*)     Squamous Epithelial Cells, UA Few (*)     Mucous, UA Trace (*)     All other components within  normal limits   COMPREHENSIVE METABOLIC PANEL - Abnormal; Notable for the following components:    Carbon Dioxide 34 (*)     Globulin 3.8 (*)     Albumin/Globulin Ratio 1.0 (*)     All other components within normal limits   CBC W/ AUTO DIFFERENTIAL    Narrative:     The following orders were created for panel order CBC auto differential.  Procedure                               Abnormality         Status                     ---------                               -----------         ------                     CBC with Differential[[id number]]                            Final result                 Please view results for these tests on the individual orders.   CBC WITH DIFFERENTIAL   EXTRA TUBES    Narrative:     The following orders were created for panel order EXTRA TUBES.  Procedure                               Abnormality         Status                     ---------                               -----------         ------                     Light Blue Top Hold[[id number]]                              In process                   Please view results for these tests on the individual orders.   LIGHT BLUE TOP HOLD          Imaging Results    None          Medications - No data to display                       Clinical Impression:   Final diagnoses:  [N30.00] Acute cystitis without hematuria (Primary)      ED Disposition Condition    Discharge Stable          ED Prescriptions       Medication Sig Dispense Start Date End Date Auth. Provider    nitrofurantoin, macrocrystal-monohydrate, (MACROBID) 100 MG capsule Take 1 capsule (100 mg total) by mouth 2 (two) times daily. for 5 days 10 capsule 8/27/2022 9/1/2022 Mario Chand MD          Follow-up Information       Follow up With Specialties Details Why Contact Info    [facility] Emergency Medicine  If symptoms worsen [address] [address] [phone]    [facility]  In 2 months  [address]  [facility] [address]             Mario Chand MD  08/27/22 2146

## 2022-09-14 ENCOUNTER — INFUSION (OUTPATIENT)
Dept: INFUSION THERAPY | Facility: HOSPITAL | Age: 47
End: 2022-09-14
Attending: INTERNAL MEDICINE
Payer: MEDICAID

## 2022-09-14 VITALS
RESPIRATION RATE: 20 BRPM | BODY MASS INDEX: 31.75 KG/M2 | SYSTOLIC BLOOD PRESSURE: 131 MMHG | TEMPERATURE: 99 F | OXYGEN SATURATION: 100 % | WEIGHT: 197.56 LBS | DIASTOLIC BLOOD PRESSURE: 92 MMHG | HEART RATE: 98 BPM | HEIGHT: 66 IN

## 2022-09-14 DIAGNOSIS — C50.919 MALIGNANT NEOPLASM OF FEMALE BREAST, UNSPECIFIED ESTROGEN RECEPTOR STATUS, UNSPECIFIED LATERALITY, UNSPECIFIED SITE OF BREAST: Primary | ICD-10-CM

## 2022-09-14 PROCEDURE — 96402 CHEMO HORMON ANTINEOPL SQ/IM: CPT

## 2022-09-14 PROCEDURE — 63600175 PHARM REV CODE 636 W HCPCS: Mod: JG | Performed by: INTERNAL MEDICINE

## 2022-09-14 RX ADMIN — LEUPROLIDE ACETATE 3.75 MG: KIT at 01:09

## 2022-10-13 ENCOUNTER — INFUSION (OUTPATIENT)
Dept: INFUSION THERAPY | Facility: HOSPITAL | Age: 47
End: 2022-10-13
Attending: INTERNAL MEDICINE
Payer: MEDICAID

## 2022-10-13 VITALS
WEIGHT: 197.75 LBS | HEIGHT: 66 IN | DIASTOLIC BLOOD PRESSURE: 95 MMHG | RESPIRATION RATE: 20 BRPM | SYSTOLIC BLOOD PRESSURE: 129 MMHG | TEMPERATURE: 99 F | OXYGEN SATURATION: 99 % | HEART RATE: 89 BPM | BODY MASS INDEX: 31.78 KG/M2

## 2022-10-13 DIAGNOSIS — C50.919 MALIGNANT NEOPLASM OF FEMALE BREAST, UNSPECIFIED ESTROGEN RECEPTOR STATUS, UNSPECIFIED LATERALITY, UNSPECIFIED SITE OF BREAST: Primary | ICD-10-CM

## 2022-10-13 PROCEDURE — 96372 THER/PROPH/DIAG INJ SC/IM: CPT

## 2022-10-13 PROCEDURE — 63600175 PHARM REV CODE 636 W HCPCS: Mod: JG | Performed by: INTERNAL MEDICINE

## 2022-10-13 RX ADMIN — LEUPROLIDE ACETATE 3.75 MG: KIT at 01:10

## 2022-10-27 ENCOUNTER — OFFICE VISIT (OUTPATIENT)
Dept: URGENT CARE | Facility: CLINIC | Age: 47
End: 2022-10-27
Payer: MEDICAID

## 2022-10-27 VITALS
OXYGEN SATURATION: 100 % | TEMPERATURE: 98 F | HEIGHT: 66 IN | DIASTOLIC BLOOD PRESSURE: 104 MMHG | WEIGHT: 196.88 LBS | RESPIRATION RATE: 18 BRPM | BODY MASS INDEX: 31.64 KG/M2 | HEART RATE: 89 BPM | SYSTOLIC BLOOD PRESSURE: 160 MMHG

## 2022-10-27 DIAGNOSIS — B02.9 HERPES ZOSTER WITHOUT COMPLICATION: Primary | ICD-10-CM

## 2022-10-27 PROCEDURE — 99215 OFFICE O/P EST HI 40 MIN: CPT | Mod: PBBFAC | Performed by: FAMILY MEDICINE

## 2022-10-27 PROCEDURE — 99214 PR OFFICE/OUTPT VISIT, EST, LEVL IV, 30-39 MIN: ICD-10-PCS | Mod: S$PBB,,, | Performed by: FAMILY MEDICINE

## 2022-10-27 PROCEDURE — 99214 OFFICE O/P EST MOD 30 MIN: CPT | Mod: S$PBB,,, | Performed by: FAMILY MEDICINE

## 2022-10-27 RX ORDER — VALACYCLOVIR HYDROCHLORIDE 1 G/1
1000 TABLET, FILM COATED ORAL 3 TIMES DAILY
Qty: 21 TABLET | Refills: 0 | Status: SHIPPED | OUTPATIENT
Start: 2022-10-27 | End: 2023-03-30

## 2022-10-27 NOTE — LETTER
October 27, 2022      [facility]  [address] [facility] [address]  Phone: [phone]       Patient: Jeni Mejia   YOB: 1975  Date of Visit: 10/27/2022    To Whom It May Concern:    Brionna Mejia  was at [facility] on 10/27/2022. The patient may return to work/school on OCT 28 2022 with no restrictions. If you have any questions or concerns, or if I can be of further assistance, please do not hesitate to contact me.    Sincerely,    CALEB GENTILE MD

## 2022-10-28 NOTE — PROGRESS NOTES
"Subjective:       Patient ID: Jeni Mejia is a 47 y.o. female.    Vitals:  height is 5' 5.75" (1.67 m) and weight is 89.3 kg (196 lb 13.9 oz). Her temperature is 98.2 °F (36.8 °C). Her blood pressure is 160/104 (abnormal) and her pulse is 89. Her respiration is 18 and oxygen saturation is 100%.     Chief Complaint: Rash (Mid back, states itching/soreness noted)    Rash    Patient with 1 day of pruritic mildly uncomfortable rash on left flank.  No systemic symptoms.  History of varicella as a child.  Chart reviewed    Skin:  Positive for rash.     Constitutional: negative except as stated in HPI  Eye: negative except as stated in HPI  ENT: negative except as stated in HPI  Respiratory: negative except as stated in HPI  Cardiovascular: negative except as stated in HPI  Gastrointestinal: negative except as stated in HPI  Genitourinary: negative except as stated in HPI  Objective:      Physical Exam   Constitutional: She appears well-developed.   HENT:   Head: Atraumatic.   Nose: No purulent discharge. Right sinus exhibits no maxillary sinus tenderness and no frontal sinus tenderness. Left sinus exhibits no maxillary sinus tenderness and no frontal sinus tenderness.   Mouth/Throat: Oropharynx is clear and moist.   Eyes: Conjunctivae are normal.   Neck: Neck supple.   Cardiovascular: Normal rate.   Pulmonary/Chest: Effort normal.   Lymphadenopathy:     She has no cervical adenopathy.   Neurological: She is alert.   Skin: Skin is warm and dry.         Comments: Zoster like rash left midback with a dermatomal pattern to lateral chest wall, no vesicles, no drainage.  No secondary infection   Nursing note and vitals reviewed.      Assessment:       1. Herpes zoster without complication            Plan:         Herpes zoster without complication    Other orders  -     valACYclovir (VALTREX) 1000 MG tablet; Take 1 tablet (1,000 mg total) by mouth 3 (three) times daily. for 7 days  Dispense: 21 tablet; Refill: 0       "   Discussed shingles.  Prescription for Valtrex.  Contagious precautions.  Please read patient education material  Return to urgent care if need

## 2022-11-04 ENCOUNTER — OFFICE VISIT (OUTPATIENT)
Dept: URGENT CARE | Facility: CLINIC | Age: 47
End: 2022-11-04
Payer: MEDICAID

## 2022-11-04 VITALS
WEIGHT: 196.63 LBS | OXYGEN SATURATION: 100 % | DIASTOLIC BLOOD PRESSURE: 84 MMHG | TEMPERATURE: 98 F | RESPIRATION RATE: 18 BRPM | SYSTOLIC BLOOD PRESSURE: 141 MMHG | HEIGHT: 66 IN | HEART RATE: 73 BPM | BODY MASS INDEX: 31.6 KG/M2

## 2022-11-04 DIAGNOSIS — B02.9 HERPES ZOSTER WITHOUT COMPLICATION: Primary | ICD-10-CM

## 2022-11-04 PROCEDURE — 99213 OFFICE O/P EST LOW 20 MIN: CPT | Mod: S$PBB,,,

## 2022-11-04 PROCEDURE — 99214 OFFICE O/P EST MOD 30 MIN: CPT | Mod: PBBFAC

## 2022-11-04 PROCEDURE — 99213 PR OFFICE/OUTPT VISIT, EST, LEVL III, 20-29 MIN: ICD-10-PCS | Mod: S$PBB,,,

## 2022-11-04 NOTE — PROGRESS NOTES
"Subjective:       Patient ID: Jeni Mejia is a 47 y.o. female.    Vitals:  height is 5' 6" (1.676 m) and weight is 89.2 kg (196 lb 9.6 oz). Her oral temperature is 98.1 °F (36.7 °C). Her blood pressure is 141/84 (abnormal) and her pulse is 73. Her respiration is 18 and oxygen saturation is 100%.     Chief Complaint: Herpes Zoster (C/o pain r/t shingles. )    Pt states she was diagnosed with shingles last week and completed valtrex yesterday. [employer] work sent her here for reevaluation because she was still having pain.      Constitution: Negative.   HENT: Negative.     Neck: neck negative.   Cardiovascular: Negative.    Eyes: Negative.    Respiratory: Negative.     Gastrointestinal: Negative.    Endocrine: negative.   Genitourinary: Negative.    Musculoskeletal:  Positive for pain.   Skin:  Positive for rash.   Allergic/Immunologic: Negative.    Neurological: Negative.      Objective:      Physical Exam   Constitutional: She is oriented to person, place, and time. normal  HENT:   Head: Normocephalic.   Nose: Nose normal.   Mouth/Throat: Uvula is midline, oropharynx is clear and moist and mucous membranes are normal. Mucous membranes are moist. Oropharynx is clear.   Eyes: Pupils are equal, round, and reactive to light.   Cardiovascular: Normal rate and normal pulses.   Pulmonary/Chest: Effort normal.   Abdominal: Normal appearance. Soft.   Musculoskeletal: Normal range of motion.         General: Normal range of motion.   Neurological: She is alert and oriented to person, place, and time.   Skin: Skin is warm, dry and rash.   Vitals reviewed.      Assessment:       1. Herpes zoster without complication          Plan:         Herpes zoster without complication       Rash has crusted over. No vesicular lesions noted.    Tylenol and ibuprofen for pain.    Instructed pt to keep rash covered, pt verbalized understanding.    Pt has an appt with PCP on Tuesday.    ER precautions given, pt verbalized understanding. "     Please see provided patient education for guidance.

## 2022-11-04 NOTE — LETTER
November 4, 2022      [facility]  [facility] [facility] [address]  Phone: [phone]       Patient: Jeni Mejia   YOB: 1975  Date of Visit: 11/04/2022    To Whom It May Concern:    Brionna Mejia  was at [facility] on 11/04/2022. The patient may return to work/school on 11/9/22 with no restrictions. If you have any questions or concerns, or if I can be of further assistance, please do not hesitate to contact me.    Sincerely,    ABHIJEET Ward

## 2022-11-07 NOTE — PROGRESS NOTES
[facility] INTERNAL MEDICINE  OUTPATIENT OFFICE VISIT NOTE       Chief Complaint: Follow-up (Break out of shingles  a week ago  was taking valacyclovir )       HPI: Jeni Mejia is a 47 y.o. yo female with  has a past medical history of Breast cancer and HTN (hypertension)., who presents for establishment with myself. Patient was seen a week ago at urgent care due to painful rash with blisters that appeared on her back in a dermatomal pattern. She was diagnosed with Shingles at the time and started on antivirals which helped to resolved her symptoms. Due to her infection, she had been off from work due to concerns of active infectious process. Today, she says her blisters have crusted over and her pain is more well controlled. She is requesting a work clearance form today to bring to her workplace to confirm that she is no longer infectious and able to work without restrictions. Patient is also due flu vaccine today which she is amenable to receiving. Aside from her shingles, patient has no other complaints at this time. She denies any fevers, chills, SOB, chest pain, abdominal pain, dysuria, constipation or diarrhea.      Past Medical History:   has a past medical history of Breast cancer and HTN (hypertension).     Past Surgical History:   has a past surgical history that includes Mastectomy (Right); Augmentation of breast (Left); Breast surgery (Right); and Tubal ligation.     Family History:  family history includes Hypertension in her mother; No Known Problems in her father.     Social History:   reports that she has never smoked. She has never used smokeless tobacco. She reports that she does not currently use alcohol. She reports that she does not use drugs.     Allergies:  is allergic to sulfa (sulfonamide antibiotics) and sulfamethoxazole-trimethoprim.     Home Medications:  Prior to Admission medications    Medication Sig Start Date End Date Taking? Authorizing Provider   anastrozole (ARIMIDEX) 1 mg Tab Take  1 tablet (1 mg total) by mouth once daily. 8/18/22   ABHIJEET Vega   calcium carbonate (OS-MARK) 600 mg calcium (1,500 mg) Tab Take 600 mg by mouth.    Historical Provider   diclofenac (VOLTAREN) 50 MG EC tablet Take 1 tablet (50 mg total) by mouth 2 (two) times daily as needed (Pain). 8/27/22   TITI Rooney   docusate sodium (COLACE) 100 MG capsule Take 100 mg by mouth. 7/20/21   Historical Provider   DULoxetine (CYMBALTA) 60 MG capsule Take 1 capsule (60 mg total) by mouth every evening. 8/18/22   ABHIJEET Vega   gabapentin (NEURONTIN) 400 MG capsule Take 400 mg by mouth. 2/7/22   Historical Provider   hydrOXYzine HCL (ATARAX) 10 MG Tab TAKE 2 TABLETS BY MOUTH FOUR TIMES A DAY AS NEEDED FOR ITCHING 2/7/22   Historical Provider   lisinopriL-hydrochlorothiazide (PRINZIDE,ZESTORETIC) 10-12.5 mg per tablet Take by mouth. 2/7/22 2/2/23  Historical Provider   ondansetron (ZOFRAN) 4 MG tablet TAKE 1 TABLET BY MOUTH EVERY 8 HOURS FOR UP TO 7 DAYS AS NEEDED FOR NAUSEA 3/23/22   Historical Provider   valACYclovir (VALTREX) 1000 MG tablet Take 1 tablet (1,000 mg total) by mouth 3 (three) times daily. for 7 days 10/27/22 11/3/22  Andry Blank MD   vitamin D (VITAMIN D3) 1000 units Tab Take 1,000 Units by mouth once daily. 7/1/22   Historical Provider       ROS:  Constitutional: no fever, fatigue, weakness  Eye: no vision loss, eye redness, drainage, or pain  ENMT: no sore throat, ear pain, sinus pain/congestion, nasal congestion/drainage  Respiratory: no cough, no wheezing, no shortness of breath  Cardiovascular: no chest pain, no palpitations, no edema  Gastrointestinal: no nausea, vomiting, or diarrhea. No abdominal pain  Genitourinary: no dysuria, no urinary frequency or urgency, no hematuria  Hema/Lymph: no abnormal bruising or bleeding  Endocrine: no heat or cold intolerance, no excessive thirst or excessive urination  Musculoskeletal: no muscle or joint pain, no joint  "swelling  Integumentary: rash on upper back  Neurologic: no headache, no dizziness, no weakness or numbness    OBJECTIVE:     Vital signs:   /86   Pulse 76   Temp 98.4 °F (36.9 °C)   Resp 20   Ht 5' 6" (1.676 m)   Wt 89.8 kg (198 lb)   SpO2 98%   BMI 31.96 kg/m²      Physical Examination:  Physical Exam  Constitutional:       Appearance: Normal appearance.   HENT:      Head: Normocephalic.      Mouth/Throat:      Mouth: Mucous membranes are moist.      Pharynx: Oropharynx is clear.   Eyes:      Conjunctiva/sclera: Conjunctivae normal.      Pupils: Pupils are equal, round, and reactive to light.   Cardiovascular:      Rate and Rhythm: Normal rate and regular rhythm.      Pulses: Normal pulses.      Heart sounds: No murmur heard.  Pulmonary:      Effort: Pulmonary effort is normal. No respiratory distress.   Chest:      Chest wall: No tenderness.   Abdominal:      General: Abdomen is flat. Bowel sounds are normal. There is no distension.      Palpations: Abdomen is soft.      Tenderness: There is no abdominal tenderness.   Musculoskeletal:         General: Normal range of motion.      Cervical back: Normal range of motion.      Comments: Rash on back with crusted over blisters no longer draining fluid; no signs of active transmittable infectious process   Skin:     General: Skin is warm.      Capillary Refill: Capillary refill takes less than 2 seconds.   Neurological:      General: No focal deficit present.      Mental Status: She is alert and oriented to person, place, and time.        Labs:  Lab Results   Component Value Date    WBC 6.2 10/13/2022    HGB 12.6 10/13/2022    HCT 38.6 10/13/2022     10/13/2022    MCV 95.1 (H) 10/13/2022    RDW 12.5 10/13/2022    Lab Results   Component Value Date     10/13/2022    K 3.4 (L) 10/13/2022    CO2 29 10/13/2022    BUN 11.3 10/13/2022    CREATININE 0.84 10/13/2022    CALCIUM 10.2 10/13/2022    MG 2.0 07/01/2020      Lab Results   Component Value " Date    HGBA1C 5.7 04/27/2022    .9 04/27/2022    CREATININE 0.84 10/13/2022    CREATRANDUR 242.1 (H) 10/12/2021    PROTEINURINE 14.4 10/12/2021    Lab Results   Component Value Date    TSH 1.4834 04/27/2022                  ASSESSMENT & PLAN:     Shingles  -Reports having history of burning rash with blisters about a week ago  -Seen in urgent care and was prescribed antivirals which helped to improve her pain and rash  -Rash currently crusted over and no longer weeping any fluids  -Patient okay to return to work from a medical perspective with no restrictions  -Will prescribe Toradol for lingering pain at this time  -Plan to administer Shingles vaccination next visit     Breast cancer-Stage IIIB (cT4b cN1 M0) Multifocal Infiltrating Ductal Carcinoma of the  Right Breast; s/p reconstructive surgery  -ER positive (93.2%), IN positive (90.3%), HER-2 negative, Ki-67: 15.3% (borderline)  -Lupron monthly (started July 28, 2020) and Arimidex  -S/p right mastectomy and lymph node dissection May 2020; last chemo April 2020, last  radiation August 2020. S/p liposuction and breast augmentation (2021) in [place]  -Patient being seen by Dr. Fraser in [facility] hematology/oncology clinic  -We will continue to follow along    Hypertension  -Well controlled; 118/86 today  -Continue HCTZ 12.5 mg-lisinopril 10 mg daily    Uterine fibroids  -Seen by gynecology (9/10/2021); suspect uterine fibroids  -No abnormal uterine bleeding noted by patient; last menstrual cycle in 2019  -Transvaginal ultrasound ordered and showed lesion in the uterus most probably related to  fibroids; one of them exophytic. Exophytic fibroid appeared to be adjacent to the left ovary.  MRI suggested by radiology; will defer to gynecology      Health Maintenance   Topic Date Due    TETANUS VACCINE  Never done    Lipid Panel  04/27/2027    Hepatitis C Screening  Completed        Health Maintenance/ Wellness  Pneumococcal vaccine: #13 (3/5/2020); #23 (at  next visit as patient just had COVID-19)  TDAP: Up-to-date 2016  Influenza vaccine: Administered today 11/22  Shingrix vaccine: Not applicable; performed at age 50  Screening colonoscopy: N/A  Pap smear: UTD 2021  Mammogram: S/P MASTECTOMY      Labs ordered: CBC, CMP  Imaging: N/A  Medications: reconciled, discussed and refills given.  RTC in 3 months      Jorge Shell MD  [facility], [facility]

## 2022-11-08 ENCOUNTER — OFFICE VISIT (OUTPATIENT)
Dept: INTERNAL MEDICINE | Facility: CLINIC | Age: 47
End: 2022-11-08
Payer: MEDICAID

## 2022-11-08 VITALS
TEMPERATURE: 98 F | RESPIRATION RATE: 20 BRPM | OXYGEN SATURATION: 98 % | BODY MASS INDEX: 31.82 KG/M2 | HEIGHT: 66 IN | HEART RATE: 76 BPM | SYSTOLIC BLOOD PRESSURE: 118 MMHG | WEIGHT: 198 LBS | DIASTOLIC BLOOD PRESSURE: 86 MMHG

## 2022-11-08 DIAGNOSIS — Z23 NEED FOR VACCINATION FOR H FLU TYPE B: Primary | ICD-10-CM

## 2022-11-08 DIAGNOSIS — I10 HYPERTENSION, UNSPECIFIED TYPE: ICD-10-CM

## 2022-11-08 DIAGNOSIS — B02.9 HERPES ZOSTER WITHOUT COMPLICATION: ICD-10-CM

## 2022-11-08 PROCEDURE — 90471 IMMUNIZATION ADMIN: CPT | Mod: PBBFAC

## 2022-11-08 PROCEDURE — 99214 OFFICE O/P EST MOD 30 MIN: CPT | Mod: PBBFAC

## 2022-11-08 RX ORDER — KETOROLAC TROMETHAMINE 10 MG/1
10 TABLET, FILM COATED ORAL EVERY 6 HOURS PRN
Qty: 30 TABLET | Refills: 0 | Status: SHIPPED | OUTPATIENT
Start: 2022-11-08 | End: 2022-11-16

## 2022-11-08 NOTE — LETTER
November 8, 2022    Jeni Mejia  [address] [place] [address]             [facility]  [address] [facility] [address]  Phone: [phone] To whom it may concern,    Ms. Jeni Mejia was seen in [facility] clinic on 11/8/22 for evaluation of shingles infection. As of today, her shingles rash is no longer showing any signs of leakage and has crusted over. While the rash is still present, there is no signs of fluid leakage from the wound. Due to this, patient is no longer considered infectious and should be able to return to work at this time with no restrictions. Patient also received her flu vaccination during this visit and it should not restrict her from returning to work.      If you have any questions or concerns, please don't hesitate to call.    Sincerely,        Jorge Shell MD

## 2022-11-09 DIAGNOSIS — C50.911 INFILTRATING DUCTAL CARCINOMA OF BREAST, RIGHT: Primary | ICD-10-CM

## 2022-11-10 ENCOUNTER — INFUSION (OUTPATIENT)
Dept: INFUSION THERAPY | Facility: HOSPITAL | Age: 47
End: 2022-11-10
Attending: INTERNAL MEDICINE
Payer: MEDICAID

## 2022-11-10 ENCOUNTER — OFFICE VISIT (OUTPATIENT)
Dept: HEMATOLOGY/ONCOLOGY | Facility: CLINIC | Age: 47
End: 2022-11-10
Payer: MEDICAID

## 2022-11-10 VITALS
DIASTOLIC BLOOD PRESSURE: 88 MMHG | SYSTOLIC BLOOD PRESSURE: 129 MMHG | WEIGHT: 196 LBS | OXYGEN SATURATION: 100 % | RESPIRATION RATE: 20 BRPM | HEIGHT: 66 IN | TEMPERATURE: 98 F | BODY MASS INDEX: 31.5 KG/M2 | HEART RATE: 75 BPM

## 2022-11-10 DIAGNOSIS — C50.919 MALIGNANT NEOPLASM OF FEMALE BREAST, UNSPECIFIED ESTROGEN RECEPTOR STATUS, UNSPECIFIED LATERALITY, UNSPECIFIED SITE OF BREAST: Primary | ICD-10-CM

## 2022-11-10 DIAGNOSIS — R19.00 PELVIC MASS: ICD-10-CM

## 2022-11-10 DIAGNOSIS — C50.919 MALIGNANT NEOPLASM OF FEMALE BREAST, UNSPECIFIED ESTROGEN RECEPTOR STATUS, UNSPECIFIED LATERALITY, UNSPECIFIED SITE OF BREAST: ICD-10-CM

## 2022-11-10 DIAGNOSIS — N85.8 OTHER SPECIFIED NONINFLAMMATORY DISORDERS OF UTERUS: ICD-10-CM

## 2022-11-10 DIAGNOSIS — C50.911 INFILTRATING DUCTAL CARCINOMA OF BREAST, RIGHT: Primary | ICD-10-CM

## 2022-11-10 PROCEDURE — 3074F PR MOST RECENT SYSTOLIC BLOOD PRESSURE < 130 MM HG: ICD-10-PCS | Mod: CPTII,,, | Performed by: NURSE PRACTITIONER

## 2022-11-10 PROCEDURE — 3079F DIAST BP 80-89 MM HG: CPT | Mod: CPTII,,, | Performed by: NURSE PRACTITIONER

## 2022-11-10 PROCEDURE — 3008F PR BODY MASS INDEX (BMI) DOCUMENTED: ICD-10-PCS | Mod: CPTII,,, | Performed by: NURSE PRACTITIONER

## 2022-11-10 PROCEDURE — 4010F PR ACE/ARB THEARPY RXD/TAKEN: ICD-10-PCS | Mod: CPTII,,, | Performed by: NURSE PRACTITIONER

## 2022-11-10 PROCEDURE — 96402 CHEMO HORMON ANTINEOPL SQ/IM: CPT

## 2022-11-10 PROCEDURE — 3008F BODY MASS INDEX DOCD: CPT | Mod: CPTII,,, | Performed by: NURSE PRACTITIONER

## 2022-11-10 PROCEDURE — 3074F SYST BP LT 130 MM HG: CPT | Mod: CPTII,,, | Performed by: NURSE PRACTITIONER

## 2022-11-10 PROCEDURE — 1159F PR MEDICATION LIST DOCUMENTED IN MEDICAL RECORD: ICD-10-PCS | Mod: CPTII,,, | Performed by: NURSE PRACTITIONER

## 2022-11-10 PROCEDURE — 99214 PR OFFICE/OUTPT VISIT, EST, LEVL IV, 30-39 MIN: ICD-10-PCS | Mod: S$PBB,,, | Performed by: NURSE PRACTITIONER

## 2022-11-10 PROCEDURE — 1160F RVW MEDS BY RX/DR IN RCRD: CPT | Mod: CPTII,,, | Performed by: NURSE PRACTITIONER

## 2022-11-10 PROCEDURE — 4010F ACE/ARB THERAPY RXD/TAKEN: CPT | Mod: CPTII,,, | Performed by: NURSE PRACTITIONER

## 2022-11-10 PROCEDURE — 63600175 PHARM REV CODE 636 W HCPCS: Mod: JG | Performed by: INTERNAL MEDICINE

## 2022-11-10 PROCEDURE — 1160F PR REVIEW ALL MEDS BY PRESCRIBER/CLIN PHARMACIST DOCUMENTED: ICD-10-PCS | Mod: CPTII,,, | Performed by: NURSE PRACTITIONER

## 2022-11-10 PROCEDURE — 99215 OFFICE O/P EST HI 40 MIN: CPT | Mod: PBBFAC | Performed by: NURSE PRACTITIONER

## 2022-11-10 PROCEDURE — 99214 OFFICE O/P EST MOD 30 MIN: CPT | Mod: S$PBB,,, | Performed by: NURSE PRACTITIONER

## 2022-11-10 PROCEDURE — 1159F MED LIST DOCD IN RCRD: CPT | Mod: CPTII,,, | Performed by: NURSE PRACTITIONER

## 2022-11-10 PROCEDURE — 3079F PR MOST RECENT DIASTOLIC BLOOD PRESSURE 80-89 MM HG: ICD-10-PCS | Mod: CPTII,,, | Performed by: NURSE PRACTITIONER

## 2022-11-10 RX ORDER — ANASTROZOLE 1 MG/1
1 TABLET ORAL DAILY
Qty: 30 TABLET | Refills: 4 | Status: SHIPPED | OUTPATIENT
Start: 2022-11-10 | End: 2023-03-14 | Stop reason: SDUPTHER

## 2022-11-10 RX ADMIN — LEUPROLIDE ACETATE 3.75 MG: KIT at 02:11

## 2022-11-10 NOTE — Clinical Note
Lupron injection today Infusion for Lupron injection on 12/8/22, 1/5/23, 2/2/23 with lab work  Schedule MRI pelvis w/wo next available-Already scheduled  Fu w/np 2-3 week with MRI results-already scheduled

## 2022-11-10 NOTE — PROGRESS NOTES
Problem List:  1. Stage IIIB (cT4b cN1 M0) Multifocal Infiltrating Ductal Carcinoma of the Right Breast  -Diagnosed: 10/01/2019  -1.2 cm, DCIS present, Right axillary LN positive  -ER positive (93.2%), CA positive (90.3%), HER-2 negative, Ki-67: 15.3% (borderline)    Current Treatment:  1. Lupron monthly. Started 7/28/2020.    2. Arimidex 1mg po daily to start 6-8 weeks after initiation of Lupron (late September).  Started 9/18/2020.    Treatment History:  1. Neoadjuvant DDAC x4 cycles: 12/13/2019-01/24/2020  2. Neoadjuvant Taxol 80 mg/m² IV weekly x12 cycles. Started 2/7/2020. Completed 12 cycles on 4/23/2020.  3. 05/27/2020: Simple right mastectomy, right axillary lymph node dissection  3. XRT 6/2/2020 to 8/7/2020.      History of Present Illness:  44-year-old female referred by Dr. Stephanie Silva for breast cancer.    Interval History 11/10/2022: Patient presents alone for scheduled follow breast cancer surveillance visit. She is due for Lupron injection today. She reports adherence to Arimidex, Calcium + Vitamin D daily. She reports doing well overall reports dental problems, vision changes and occasional headache. She also reports forgetfulness and confusion at times. She is UTD with DEXA scan and mammogram. She is aware of upcoming mammogram. She says that she has sharp pain with certain movements to left pelvic area. There is prior imaging MRI which showed a 4cm mass positioned between the uterine fundus and left ovary impression likely exophytic fibroid. Patient says she was never told of imaging results. She says she has not seen her GYN doctor in over a year. She is amenable for referral to GYN services here at [facility]. Lab work reviewed with patient, stable. She denies any unintentional weight loss, changes to bowel/bladder patterns, blood in urine/stool, N/V/D/C, Lymphedema, unusual headaches or dyspnea. Lab work reviewed with patient, stable slightly low potassium.    Review of Systems: A complete 12-point  ROS was performed in full with pertinent positives as described in interval history. Remainder of ROS done in full and are negative.    Lab Results   Component Value Date    WBC 6.7 11/10/2022    RBC 4.13 (L) 11/10/2022    HGB 12.9 11/10/2022    HCT 38.5 11/10/2022    MCV 93.2 11/10/2022    MCH 31.2 (H) 11/10/2022    MCHC 33.5 11/10/2022    RDW 12.3 11/10/2022     11/10/2022    MPV 10.3 11/10/2022       CMP  Sodium Level   Date Value Ref Range Status   11/10/2022 143 136 - 145 mmol/L Final     Potassium Level   Date Value Ref Range Status   11/10/2022 3.3 (L) 3.5 - 5.1 mmol/L Final     Carbon Dioxide   Date Value Ref Range Status   11/10/2022 32 (H) 22 - 29 mmol/L Final     Blood Urea Nitrogen   Date Value Ref Range Status   11/10/2022 10.7 7.0 - 18.7 mg/dL Final     Creatinine   Date Value Ref Range Status   11/10/2022 0.83 0.55 - 1.02 mg/dL Final     Calcium Level Total   Date Value Ref Range Status   11/10/2022 10.0 8.4 - 10.2 mg/dL Final     Albumin Level   Date Value Ref Range Status   11/10/2022 3.7 3.5 - 5.0 gm/dL Final     Bilirubin Total   Date Value Ref Range Status   11/10/2022 0.3 <=1.5 mg/dL Final     Alkaline Phosphatase   Date Value Ref Range Status   11/10/2022 142 40 - 150 unit/L Final     Aspartate Aminotransferase   Date Value Ref Range Status   11/10/2022 18 5 - 34 unit/L Final     Alanine Aminotransferase   Date Value Ref Range Status   11/10/2022 14 0 - 55 unit/L Final     eGFR   Date Value Ref Range Status   11/10/2022 >60 mls/min/1.73/m2 Final       Physical Exam   Vitals & Measurements  Vitals:    11/10/22 1345   BP: 129/88   Pulse: 75   Resp: 20   Temp: 97.8 °F (36.6 °C)       General: Alert and oriented. NAD  Eye: Pupils are equal, round and reactive to light, Extraocular movements are intact. Normal conjunctiva  HENT: Normocephalic. Oropharynx exam deferred; mask in place due to coronavirus  Neck: Supple, Non-tender  Respiratory: Respirations are non-labored, Symmetrical chest wall  expansion. Breath sounds CTA bilaterally  Cardiovascular: Regular rate, rhythm, Normal peripheral perfusion, No bilateral lower extremity edema  Gastrointestinal: Non-distended, Present bowel sounds, palpable left pelvic mass tender to touch  Genitourinary: Exam deferred  Lymphatics: No lymphadenopathy appreciated  Musculoskeletal: Moves all extremities  Integumentary: Intact. Warm, dry. No rashes, or lesions to visible skin  Neurologic: No focal deficits  Psychiatric: Cooperative. Appropriate mood and affect   ECOG Performance Scale: 0 - Capable of all self-care no restrictions    Assessment:  1. Breast cancer C50.919 1. Breast cancer of lower-inner quadrant of right female breast C50.311  #Multifocal infiltrating ductal carcinoma of right breast, status post right axillary lymph node biopsy (positive: 10/01/2019)  At least 5 breast masses, multiple abnormal lymph nodes in axilla (mammogram and ultrasound 09/24/2019)  IDC, intermediate grade, at least 1.2 cm, along with DCIS; right axillary lymph node positive (biopsy 10/01/2019)  ER positive (93.2%). OR positive (90.3%). HER-2 negative (IHC, FISH testing). 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 OR positive. HER-2 negative.  -Neoadjuvant DDAC x4 (12/13/2019-01/24/2020), clinically, with good response  -Neoadjuvant weekly Taxol x12 (02/07/2020-04/23/2020)   -05/27/2020: Simple right mastectomy, right  axillary lymph node dissection:  Pathology: Total right mastectomy; invasive carcinoma; multiple foci of invasive carcinoma; largest invasive carcinoma, at least 2.0 cm; overall grade 1; DCIS present, positive for extensive intraductal component; size of DCIS, 5 cm, cribriform, clinging, micropapillary, nuclear grade 3, central necrosis; invasive carcinoma margins uninvolved, 3.4 cm; DCIS margins uninvolved, 3.4 cm; number of lymph nodes examined, 20; number of sentinel lymph nodes examined, 3; number of lymph nodes with macro metastasis, 3; number of lymph nodes with micrometastasis, 2; number of lymph nodes with isolated tumor cells, 0; no extranodal extension; no definite response to presurgical therapy in the invasive carcinoma; no definite response to presurgical therapy in the metastatic carcinoma; lymphovascular invasion present  --> pT2 pN2a (2.0 cm tumor; 5 lymph nodes positive) (no definitive response to neoadjuvant chemotherapy)  2. Right axillary SLN #1, excisional biopsy: Negative for carcinoma  3 Right axillary SLN #2, excisional biopsy: 1 lymph node, positive for micrometastasis  4. Right axillary SLN #3, excisional biopsy: 1 lymph node, positive for 1 micrometastasis  5. Right axillary lymph node, dissection permanent: 1/15 lymph nodes identified  ER positive (60%). [place] positive (99%). HER-2 not overexpressed (score 0). Ki-67 low proliferation (2%)  -No metastasis (CT C/A/P, bone scan: 07/01/2020; brain MRI: 07/07/2020)  -Normal BMD (DEXA: 07/01/2020)  -11/24/2021: Bilateral diagnostic contrast-enhanced MMG with tiffany: Right Breast: Benign (BI-RADS 2) Left Breast: Benign (BI-RADS 2)   -----    # Premenopausal as of 11/11/2019 by history  -No menstrual cycles since December 2019 (after starting neoadjuvant chemotherapy)  ----    # Family history of breast cancer and uterine cancers  -12/02/2019: Genetic testing: Three (3) variants of uncertain significance (VUS): AXIN2 c.1573C>G (p.[id number]), msh3  c.2005C>T (p.Ryg102Xro), and POLE c.1007A>G (p.[id number])  ----    #Exophytic fibroid versus left adnexal mass (4 cm, solid) on CT 11/22/2019 12/03/2019: Pelvic ultrasound: No discrete sonographic pathology    Plan:  -Since she has aggressive, node positive disease, therefore, for adjuvant endocrine therapy, she will benefit from ovarian suppression plus aromatase inhibitor therapy.    Recommendation: Counseling for fertility concerns if premenopausal; pregnancy test in all women of childbearing potential.  She is already status post tubal ligation.    -Significant residual disease (2 cm invasive carcinoma, 5 lymph nodes positive) post neoadjuvant chemotherapy, with no definitive response to neoadjuvant chemotherapy  -Metastasis have been ruled out  -Needs adjuvant radiation therapy completed  -Was premenopausal as of 11/11/2019, before starting neoadjuvant chemotherapy  -Lupron shots monthly    -No evidence of disease recurrence. Continue surveillance.  -Continue Lupron monthly; due today, 12/8/2022, 1/5/2023, & 2/2/2023.  -Schedule MRI of pelvis w/wo to evaluate mass likely fibroid observed on prior imaging  -Follow up w/np in 2-3 weeks with MRI results no lab work needed via telemedicine  -Annual screening MMG bilateral due 11/2022; Scheduled for 11/29/2022  -Left pelvic mass-MRI pelvic w/wo-ordered today, prior MRI 12/2021 4cm enhancing mass likely exophytic fibroid patient says worsening pain left pelvic area with certain movements  -Refer to GYN for further evaluation and workup for left pelvic mass impressionable of exophytic fibroid per prior MRI imagine 12/2021    Breast cancer surveillance:  -History and physical exam 1-4 times per year for 5 years, then annually;     -Mammogram every 12 months, with no clear advantage to shorter interval imaging; patient should wait 6-12 months after the completion of radiation therapy to begin their annual mammogram surveillance; suspicious findings on physical  examination or surveillance imaging might warrant a shorter interval between mammograms;     -Educate, monitor, and referred for lymphedema management    -Women on an aromatase inhibitor or who experience ovarian failure secondary to treatment, should have monitoring of bone health with a bone mineral density determination at baseline and periodically thereafter (the use of a bisphosphonate, oral or IV, or denosumab is acceptable to maintain or to improve bone mineral density and reduce risk of fractures in postmenopausal (natural or induced) patients receiving adjuvant endocrine therapy. Optimal duration of therapy has not been established. Duration beyond 3 years is not known. Women treated with a bisphosphonate or denosumab should take supplemental calcium and vitamin D.    -Routine imaging of reconstructed breast is not indicated;     -In the absence of clinical signs or symptoms suggestive of recurrent disease, there is no indication for laboratory or imaging studies for metastasis screening;     -Active lifestyle, healthy diet, limited alcohol intake, and achieving and maintaining an ideal body weight (20-25 BMI) may lead to optimal breast cancer outcomes.     2. Long term (current) use of aromatase inhibitors Z79.811 Needs adjuvant endocrine therapy  For adjuvant endocrine therapy, will treat her with LHRH agonist plus aromatase inhibitor (Arimidex) for 5 years  Arimidex to start 6-8 weeks after initiation of monthly LHRH agonist shots  Baseline DEXA scan (03/21/2020, with normal BMD   Normal BMD on baseline DEXA scan (07/01/2020)  7/7/2022 DEXA normal but showed decreased density  Advised to start taking calcium and vitamin D supplements (calcium 1200 mg daily and vitamin D3 800-1000 units daily)     Continue Arimidex daily.refills sent to pharmacy  Continue calcium + vitamin D supplementation daily.  Repeat DEXA scan in 1 years (7/2023); Ordered today    3. 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 nightly started 03/18/2020     Continue gabapentin and Cymbalta for Taxol-induced peripheral neuropathy.

## 2022-12-08 ENCOUNTER — INFUSION (OUTPATIENT)
Dept: INFUSION THERAPY | Facility: HOSPITAL | Age: 47
End: 2022-12-08
Attending: INTERNAL MEDICINE
Payer: MEDICAID

## 2022-12-08 DIAGNOSIS — C50.919 MALIGNANT NEOPLASM OF FEMALE BREAST, UNSPECIFIED ESTROGEN RECEPTOR STATUS, UNSPECIFIED LATERALITY, UNSPECIFIED SITE OF BREAST: Primary | ICD-10-CM

## 2022-12-08 PROCEDURE — 96402 CHEMO HORMON ANTINEOPL SQ/IM: CPT

## 2022-12-08 PROCEDURE — 63600175 PHARM REV CODE 636 W HCPCS: Mod: JG | Performed by: INTERNAL MEDICINE

## 2022-12-08 RX ADMIN — LEUPROLIDE ACETATE 3.75 MG: KIT at 02:12

## 2022-12-09 ENCOUNTER — HOSPITAL ENCOUNTER (OUTPATIENT)
Dept: RADIOLOGY | Facility: HOSPITAL | Age: 47
Discharge: HOME | End: 2022-12-09
Attending: NURSE PRACTITIONER
Payer: MEDICAID

## 2022-12-09 DIAGNOSIS — N85.8 OTHER SPECIFIED NONINFLAMMATORY DISORDERS OF UTERUS: ICD-10-CM

## 2022-12-09 DIAGNOSIS — R19.00 PELVIC MASS: ICD-10-CM

## 2022-12-09 PROCEDURE — 72197 MRI PELVIS W/O & W/DYE: CPT | Mod: TC

## 2022-12-09 PROCEDURE — 25500020 PHARM REV CODE 255

## 2022-12-09 PROCEDURE — A9577 INJ MULTIHANCE: HCPCS

## 2022-12-09 RX ADMIN — GADOBENATE DIMEGLUMINE 18 ML: 529 INJECTION, SOLUTION INTRAVENOUS at 08:12

## 2022-12-13 ENCOUNTER — OFFICE VISIT (OUTPATIENT)
Dept: HEMATOLOGY/ONCOLOGY | Facility: CLINIC | Age: 47
End: 2022-12-13
Payer: MEDICAID

## 2022-12-13 DIAGNOSIS — C50.911 INFILTRATING DUCTAL CARCINOMA OF BREAST, RIGHT: Primary | ICD-10-CM

## 2022-12-13 DIAGNOSIS — E87.6 HYPOKALEMIA: ICD-10-CM

## 2022-12-13 PROCEDURE — 4010F ACE/ARB THERAPY RXD/TAKEN: CPT | Mod: CPTII,95,, | Performed by: NURSE PRACTITIONER

## 2022-12-13 PROCEDURE — 1159F PR MEDICATION LIST DOCUMENTED IN MEDICAL RECORD: ICD-10-PCS | Mod: CPTII,95,, | Performed by: NURSE PRACTITIONER

## 2022-12-13 PROCEDURE — 99213 OFFICE O/P EST LOW 20 MIN: CPT | Mod: 95,,, | Performed by: NURSE PRACTITIONER

## 2022-12-13 PROCEDURE — 1160F PR REVIEW ALL MEDS BY PRESCRIBER/CLIN PHARMACIST DOCUMENTED: ICD-10-PCS | Mod: CPTII,95,, | Performed by: NURSE PRACTITIONER

## 2022-12-13 PROCEDURE — 4010F PR ACE/ARB THEARPY RXD/TAKEN: ICD-10-PCS | Mod: CPTII,95,, | Performed by: NURSE PRACTITIONER

## 2022-12-13 PROCEDURE — 1159F MED LIST DOCD IN RCRD: CPT | Mod: CPTII,95,, | Performed by: NURSE PRACTITIONER

## 2022-12-13 PROCEDURE — 99213 PR OFFICE/OUTPT VISIT, EST, LEVL III, 20-29 MIN: ICD-10-PCS | Mod: 95,,, | Performed by: NURSE PRACTITIONER

## 2022-12-13 PROCEDURE — 1160F RVW MEDS BY RX/DR IN RCRD: CPT | Mod: CPTII,95,, | Performed by: NURSE PRACTITIONER

## 2022-12-13 RX ORDER — POTASSIUM CHLORIDE 20 MEQ/1
20 TABLET, EXTENDED RELEASE ORAL DAILY
Qty: 30 TABLET | Refills: 0 | Status: SHIPPED | OUTPATIENT
Start: 2022-12-13 | End: 2023-03-30

## 2022-12-13 NOTE — PROGRESS NOTES
Problem List:  1. Stage IIIB (cT4b cN1 M0) Multifocal Infiltrating Ductal Carcinoma of the Right Breast  -Diagnosed: 10/01/2019  -1.2 cm, DCIS present, Right axillary LN positive  -ER positive (93.2%), DC positive (90.3%), HER-2 negative, Ki-67: 15.3% (borderline)    Current Treatment:  1. Lupron monthly. Started 7/28/2020.    2. Arimidex 1mg po daily to start 6-8 weeks after initiation of Lupron (late September).  Started 9/18/2020.    Treatment History:  1. Neoadjuvant DDAC x4 cycles: 12/13/2019-01/24/2020  2. Neoadjuvant Taxol 80 mg/m² IV weekly x12 cycles. Started 2/7/2020. Completed 12 cycles on 4/23/2020.  3. 05/27/2020: Simple right mastectomy, right axillary lymph node dissection  3. XRT 6/2/2020 to 8/7/2020.    History of Present Illness:  44-year-old female referred by Dr. Stephanie Silva for breast cancer.    Interval History 12/13/2022: Patient presents alone for scheduled follow breast cancer surveillance visit. She reports adherence to Arimidex, Calcium + Vitamin D daily. She reports doing well overall reports headache, vision changes, night sweats, hot flashes, numbness and tingling, and swelling in arm on right side. Patient says she does not use lymphedema sleeve nor lymphedema machine as often as she should but will start to help with swelling. She completed the repeat MRI pelvic imaging which was without any new or worsening findings. Patient urged to follow up with GYN for further planning. Patient says she still has not heard anything from GYN for an appointment. Lab work reviewed with patient, stable with low potassium level. he denies any unintentional weight loss, changes to bowel/bladder patterns, blood in urine/stool, N/V/D/C. Denies any further questions or concerns.       This is a telemedicine note. Patient was treated using telemedicine, real time audio and video, according to [facility] protocols. I, distant provider, conducted the visit from location identified below. The patient participated  in the visit at a non-[facility] location selected by the patient (or patient's representative), identified below. I am licensed in the state where the patient stated they are located. The patient (or patient's representative) stated that they understood and accepted the privacy and security risks to their information at their location. Patient was located at her/his residence in , [place]. I, distant provider, was located at [facility].    Face to face time spent with patient exceeds 15 minutes, over 50% of which was used for education and counseling regarding medical conditions, current medications including risk/benefit and side effects/adverse events, over the counter medications-uses/doses, home self-care and contact precautions, and red flags and indications for immediate medical attention. The patient is receptive, expresses understanding and is agreeable to plan. All questions answered.      Review of Systems: A complete 12-point ROS was performed in full with pertinent positives as described in interval history. Remainder of ROS done in full and are negative.    Lab Results   Component Value Date    WBC 5.7 12/08/2022    RBC 4.13 (L) 12/08/2022    HGB 13.0 12/08/2022    HCT 38.9 12/08/2022    MCV 94.2 (H) 12/08/2022    MCH 31.5 (H) 12/08/2022    MCHC 33.4 12/08/2022    RDW 12.1 12/08/2022     12/08/2022    MPV 10.8 (H) 12/08/2022       CMP  Sodium Level   Date Value Ref Range Status   12/08/2022 143 136 - 145 mmol/L Final     Potassium Level   Date Value Ref Range Status   12/08/2022 3.4 (L) 3.5 - 5.1 mmol/L Final     Carbon Dioxide   Date Value Ref Range Status   12/08/2022 27 22 - 29 mmol/L Final     Blood Urea Nitrogen   Date Value Ref Range Status   12/08/2022 10.6 7.0 - 18.7 mg/dL Final     Creatinine   Date Value Ref Range Status   12/08/2022 0.88 0.55 - 1.02 mg/dL Final     Calcium Level Total   Date Value Ref Range Status   12/08/2022 10.1 8.4 - 10.2 mg/dL Final     Albumin Level   Date Value Ref Range  Status   12/08/2022 3.8 3.5 - 5.0 gm/dL Final     Bilirubin Total   Date Value Ref Range Status   12/08/2022 0.4 <=1.5 mg/dL Final     Alkaline Phosphatase   Date Value Ref Range Status   12/08/2022 139 40 - 150 unit/L Final     Aspartate Aminotransferase   Date Value Ref Range Status   12/08/2022 23 5 - 34 unit/L Final     Alanine Aminotransferase   Date Value Ref Range Status   12/08/2022 23 0 - 55 unit/L Final     eGFR   Date Value Ref Range Status   12/08/2022 >60 mls/min/1.73/m2 Final     Physical Exam: Physical Exam was not completed today as this was a telemedicine visit.    Assessment:  1. Breast cancer C50.919 1. Breast cancer of lower-inner quadrant of right female breast C50.311  #Multifocal infiltrating ductal carcinoma of right breast, status post right axillary lymph node biopsy (positive: 10/01/2019)  At least 5 breast masses, multiple abnormal lymph nodes in axilla (mammogram and ultrasound 09/24/2019)  IDC, intermediate grade, at least 1.2 cm, along with DCIS; right axillary lymph node positive (biopsy 10/01/2019)  ER positive (93.2%). [place] positive (90.3%). HER-2 negative (IHC, FISH testing). 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 DE positive. HER-2 negative.  -Neoadjuvant DDAC x4 (12/13/2019-01/24/2020), clinically, with good response  -Neoadjuvant weekly Taxol x12 (02/07/2020-04/23/2020)   -05/27/2020: Simple right mastectomy, right  axillary lymph node dissection:  Pathology: Total right mastectomy; invasive carcinoma; multiple foci of invasive carcinoma; largest invasive carcinoma, at least 2.0 cm; overall grade 1; DCIS present, positive for extensive intraductal component; size of DCIS, 5 cm, cribriform, clinging, micropapillary, nuclear grade 3, central necrosis; invasive carcinoma margins uninvolved, 3.4 cm; DCIS margins uninvolved, 3.4 cm; number of lymph nodes examined, 20; number of sentinel lymph nodes examined, 3; number of lymph nodes with macro metastasis, 3; number of lymph nodes with micrometastasis, 2; number of lymph nodes with isolated tumor cells, 0; no extranodal extension; no definite response to presurgical therapy in the invasive carcinoma; no definite response to presurgical therapy in the metastatic carcinoma; lymphovascular invasion present  --> pT2 pN2a (2.0 cm tumor; 5 lymph nodes positive) (no definitive response to neoadjuvant chemotherapy)  2. Right axillary SLN #1, excisional biopsy: Negative for carcinoma  3 Right axillary SLN #2, excisional biopsy: 1 lymph node, positive for micrometastasis  4. Right axillary SLN #3, excisional biopsy: 1 lymph node, positive for 1 micrometastasis  5. Right axillary lymph node, dissection permanent: 1/15 lymph nodes identified  ER positive (60%). [place] positive (99%). HER-2 not overexpressed (score 0). Ki-67 low proliferation (2%)  -No metastasis (CT C/A/P, bone scan: 07/01/2020; brain MRI: 07/07/2020)  -Normal BMD (DEXA: 07/01/2020)  -11/24/2021: Bilateral diagnostic contrast-enhanced MMG with tiffany: Right Breast: Benign (BI-RADS 2) Left Breast: Benign (BI-RADS 2)   -----    # Premenopausal as of 11/11/2019 by history  -No menstrual cycles since December 2019 (after starting neoadjuvant chemotherapy)  ----    # Family history of breast cancer and uterine cancers  -12/02/2019: Genetic testing: Three (3) variants of uncertain significance (VUS): AXIN2 c.1573C>G (p.[id number]), msh3  c.2005C>T (p.Nor971Rhu), and POLE c.1007A>G (p.Icf987Xpu)  ----    #Exophytic fibroid versus left adnexal mass (4 cm, solid) on CT 11/22/2019 12/03/2019: Pelvic ultrasound: No discrete sonographic pathology    Plan:  -Since she has aggressive, node positive disease, therefore, for adjuvant endocrine therapy, she will benefit from ovarian suppression plus aromatase inhibitor therapy.    Recommendation: Counseling for fertility concerns if premenopausal; pregnancy test in all women of childbearing potential.  She is already status post tubal ligation.    -Significant residual disease (2 cm invasive carcinoma, 5 lymph nodes positive) post neoadjuvant chemotherapy, with no definitive response to neoadjuvant chemotherapy  -Metastasis have been ruled out  -Needs adjuvant radiation therapy completed  -Was premenopausal as of 11/11/2019, before starting neoadjuvant chemotherapy  -Lupron shots monthly    -No evidence of disease recurrence. Continue surveillance.  -Start potassium chloride 20meq po daily-Rx sent to pharmacy   -Continue Lupron monthly; due today, 1/5/2023, 2/2/2023, 3/2/2023  -Follow up w/np in 3 months with lab work   -Annual screening MMG bilateral due 11/2023;   -Refer to GYN for further evaluation and workup for left pelvic mass impressionable of exophytic fibroid per prior MRI imagine 12/2021-referral pending will check on status      Breast cancer surveillance:  -History and physical exam 1-4 times per year for 5 years, then annually;     -Mammogram every 12 months, with no clear advantage to shorter interval imaging; patient should wait 6-12 months after the completion of radiation therapy to begin their annual mammogram surveillance; suspicious findings on physical examination or surveillance imaging might warrant a shorter interval between mammograms;     -Educate, monitor, and referred for lymphedema management    -Women on an aromatase inhibitor or who experience ovarian failure secondary to  treatment, should have monitoring of bone health with a bone mineral density determination at baseline and periodically thereafter (the use of a bisphosphonate, oral or IV, or denosumab is acceptable to maintain or to improve bone mineral density and reduce risk of fractures in postmenopausal (natural or induced) patients receiving adjuvant endocrine therapy. Optimal duration of therapy has not been established. Duration beyond 3 years is not known. Women treated with a bisphosphonate or denosumab should take supplemental calcium and vitamin D.    -Routine imaging of reconstructed breast is not indicated;     -In the absence of clinical signs or symptoms suggestive of recurrent disease, there is no indication for laboratory or imaging studies for metastasis screening;     -Active lifestyle, healthy diet, limited alcohol intake, and achieving and maintaining an ideal body weight (20-25 BMI) may lead to optimal breast cancer outcomes.     2. Long term (current) use of aromatase inhibitors ZSarkis Needs adjuvant endocrine therapy  For adjuvant endocrine therapy, will treat her with LHRH agonist plus aromatase inhibitor (Arimidex) for 5 years  Arimidex to start 6-8 weeks after initiation of monthly LHRH agonist shots  Baseline DEXA scan (03/21/2020, with normal BMD   Normal BMD on baseline DEXA scan (07/01/2020)  7/7/2022 DEXA normal but showed decreased density  Advised to start taking calcium and vitamin D supplements (calcium 1200 mg daily and vitamin D3 800-1000 units daily)     Continue Arimidex daily.refills sent to pharmacy  Continue calcium + vitamin D supplementation daily.  Repeat DEXA scan in 1 years (7/2023); Need to be scheduled     3. 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 nightly started 03/18/2020     Continue gabapentin and Cymbalta for Taxol-induced peripheral neuropathy.

## 2023-01-05 ENCOUNTER — INFUSION (OUTPATIENT)
Dept: INFUSION THERAPY | Facility: HOSPITAL | Age: 48
End: 2023-01-05
Attending: INTERNAL MEDICINE
Payer: MEDICAID

## 2023-01-05 VITALS
OXYGEN SATURATION: 99 % | RESPIRATION RATE: 20 BRPM | DIASTOLIC BLOOD PRESSURE: 96 MMHG | WEIGHT: 197.31 LBS | TEMPERATURE: 99 F | HEIGHT: 66 IN | HEART RATE: 85 BPM | SYSTOLIC BLOOD PRESSURE: 136 MMHG | BODY MASS INDEX: 31.71 KG/M2

## 2023-01-05 DIAGNOSIS — C50.919 MALIGNANT NEOPLASM OF FEMALE BREAST, UNSPECIFIED ESTROGEN RECEPTOR STATUS, UNSPECIFIED LATERALITY, UNSPECIFIED SITE OF BREAST: Primary | ICD-10-CM

## 2023-01-05 PROCEDURE — 63600175 PHARM REV CODE 636 W HCPCS: Mod: JG | Performed by: INTERNAL MEDICINE

## 2023-01-05 PROCEDURE — 96402 CHEMO HORMON ANTINEOPL SQ/IM: CPT

## 2023-01-05 RX ADMIN — LEUPROLIDE ACETATE 3.75 MG: KIT at 01:01

## 2023-01-05 NOTE — NURSING
Pt did labwork and is here for lupron q 4 weeks.  Preports left pelvic pain 5/10 with some constipation and fatigue.   Pt questioning gyn referral update for second opinion about MRI results.  Messaged A Rosalino NP about contacting pt about that status update.

## 2023-01-17 ENCOUNTER — HOSPITAL ENCOUNTER (OUTPATIENT)
Dept: RADIOLOGY | Facility: HOSPITAL | Age: 48
Discharge: HOME | End: 2023-01-17
Attending: NURSE PRACTITIONER
Payer: MEDICAID

## 2023-01-17 DIAGNOSIS — Z12.31 BREAST CANCER SCREENING BY MAMMOGRAM: ICD-10-CM

## 2023-01-17 PROCEDURE — 25500020 PHARM REV CODE 255: Performed by: NURSE PRACTITIONER

## 2023-01-17 PROCEDURE — 77067 MAMMO DIGITAL SCREENING WITH CONTRAST, BILATERAL: ICD-10-PCS | Mod: 26,,, | Performed by: RADIOLOGY

## 2023-01-17 PROCEDURE — 77067 SCR MAMMO BI INCL CAD: CPT | Mod: TC

## 2023-01-17 PROCEDURE — 77067 SCR MAMMO BI INCL CAD: CPT | Mod: 26,,, | Performed by: RADIOLOGY

## 2023-01-17 RX ADMIN — IOHEXOL 150 ML: 350 INJECTION, SOLUTION INTRAVENOUS at 03:01

## 2023-01-26 ENCOUNTER — OFFICE VISIT (OUTPATIENT)
Dept: SURGERY | Facility: CLINIC | Age: 48
End: 2023-01-26
Payer: MEDICAID

## 2023-01-26 VITALS
WEIGHT: 192 LBS | SYSTOLIC BLOOD PRESSURE: 128 MMHG | OXYGEN SATURATION: 100 % | BODY MASS INDEX: 31.99 KG/M2 | HEIGHT: 65 IN | TEMPERATURE: 98 F | HEART RATE: 86 BPM | RESPIRATION RATE: 18 BRPM | DIASTOLIC BLOOD PRESSURE: 88 MMHG

## 2023-01-26 DIAGNOSIS — Z90.10 HISTORY OF MASTECTOMY, UNSPECIFIED LATERALITY: Primary | ICD-10-CM

## 2023-01-26 PROCEDURE — 99213 OFFICE O/P EST LOW 20 MIN: CPT | Mod: PBBFAC

## 2023-01-26 NOTE — PROGRESS NOTES
Pt was seen by . I (Beatriz Parekh)  went in to observe along with  for breast examing. Patient didn't have any complaints and she will rtc as needed.

## 2023-01-26 NOTE — PROGRESS NOTES
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 ER +, PA +, HER-2 negative, Ki-67: 15.3%. Patient s/p right breast reconstruction and left breast reduction.  here for 1 year follow up   Reports bilateral lateral breast tenderness. Reports fatigue, nausea, hot flashes, headaches, intermittent blurry vision, and constipation. Positive for right hand tingling. Positive for brittle teeth, lumbar back and pelvic pain. Denies CP and SOB.     Objective:     Vitals:    01/26/23 1410   BP: 128/88   Pulse: 86   Resp: 18   Temp: 97.9 °F (36.6 °C)        Physical Exam:  Gen: NAD, well nourished  Neuro: awake, alert, answering questions appropriately  HEENT: EOM grossly intact; Anicteric sclera  CV: regular rhythm  Resp: non-labored breathing  Breast: reconstructed right breast; no erythema bilaterally  Abd: soft, non-tender, non-distended  Ext: no edema in ankles bilaterally    Imaging reviewed:  Pertinent findings: 01/17/23     Mammographic Findings:  There are scattered areas of fibroglandular density.    There is mild background parenchymal enhancement.    RIGHT BREAST:  Right postmastectomy changes with reconstruction and surgical clip are noted.  No suspicious mass, asymmetry, distortion, or calcification is identified.   No suspicious enhancement is identified.     LEFT BREAST:  Postsurgical changes related to reduction mammoplasty, left breast.  Biopsy clips, left breast.  No suspicious mass, asymmetry, distortion, or calcification is identified.   No suspicious enhancement is identified.        IMPRESSION: BENIGN  Right Breast: Benign (BI-RADS 2)  Left Breast: Benign (BI-RADS 2)      Assessment/Plan:     Jeni Mejia is a 47 y.o.female with PMHx of Stage IIIB multifocal  Infiltrating Ductal Carcinoma of the Right Breast s/p right modified radical mastectomy and s/p breast reconstruction.    - continue following heme/onc  - annual contrast-enhanced mammography with Digital Breast Tomosynthesis   - RTC PRN    Manan Robert, MS3  [facility]  1/26/2023 2:07 PM      RESIDENT ATTESTATION:    Agree with above documentation of history and physical exam. Changes made to body of text. In summary: 47F s/p R MRM and breast recon, repeat mammograms BI-RADS2 bilaterally. Return to routine screening. RTC PRN.      Chris Sher MD, PGY3  [facility] General Surgery

## 2023-01-26 NOTE — LETTER
January 26, 2023      [facility]  [address] [address]  Phone: [phone]       Patient: Jeni Mejia   YOB: 1975  Date of Visit: 01/26/2023    To Whom It May Concern:    Brionna Mejia  was at [facility] on 01/26/2023. The patient may return to work/school on 1/26/2023 with no restrictions. If you have any questions or concerns, or if I can be of further assistance, please do not hesitate to contact me.    Sincerely,    Beatriz Parekh MA

## 2023-01-27 NOTE — PROGRESS NOTES
I have reviewed the notes, assessments, and/or procedures performed by the resident, I concur with her/his documentation of Jeni Mejia.     Vianey Luna MD

## 2023-02-02 ENCOUNTER — INFUSION (OUTPATIENT)
Dept: INFUSION THERAPY | Facility: HOSPITAL | Age: 48
End: 2023-02-02
Attending: INTERNAL MEDICINE
Payer: MEDICAID

## 2023-02-02 VITALS
HEART RATE: 84 BPM | DIASTOLIC BLOOD PRESSURE: 100 MMHG | SYSTOLIC BLOOD PRESSURE: 140 MMHG | WEIGHT: 196.19 LBS | TEMPERATURE: 98 F | OXYGEN SATURATION: 98 % | BODY MASS INDEX: 32.69 KG/M2 | RESPIRATION RATE: 20 BRPM | HEIGHT: 65 IN

## 2023-02-02 DIAGNOSIS — C50.919 MALIGNANT NEOPLASM OF FEMALE BREAST, UNSPECIFIED ESTROGEN RECEPTOR STATUS, UNSPECIFIED LATERALITY, UNSPECIFIED SITE OF BREAST: Primary | ICD-10-CM

## 2023-02-02 PROCEDURE — 63600175 PHARM REV CODE 636 W HCPCS: Mod: JG | Performed by: INTERNAL MEDICINE

## 2023-02-02 PROCEDURE — 96402 CHEMO HORMON ANTINEOPL SQ/IM: CPT

## 2023-02-02 RX ADMIN — LEUPROLIDE ACETATE 3.75 MG: KIT at 01:02

## 2023-03-02 ENCOUNTER — INFUSION (OUTPATIENT)
Dept: INFUSION THERAPY | Facility: HOSPITAL | Age: 48
End: 2023-03-02
Attending: INTERNAL MEDICINE
Payer: MEDICAID

## 2023-03-02 VITALS — BODY MASS INDEX: 32.65 KG/M2 | HEIGHT: 65 IN

## 2023-03-02 DIAGNOSIS — C50.919 MALIGNANT NEOPLASM OF FEMALE BREAST, UNSPECIFIED ESTROGEN RECEPTOR STATUS, UNSPECIFIED LATERALITY, UNSPECIFIED SITE OF BREAST: Primary | ICD-10-CM

## 2023-03-02 PROCEDURE — 63600175 PHARM REV CODE 636 W HCPCS: Mod: JZ,JG | Performed by: INTERNAL MEDICINE

## 2023-03-02 PROCEDURE — 96402 CHEMO HORMON ANTINEOPL SQ/IM: CPT

## 2023-03-02 RX ADMIN — LEUPROLIDE ACETATE 3.75 MG: KIT at 02:03

## 2023-03-13 ENCOUNTER — TELEPHONE (OUTPATIENT)
Dept: HEMATOLOGY/ONCOLOGY | Facility: CLINIC | Age: 48
End: 2023-03-13
Payer: MEDICAID

## 2023-03-14 ENCOUNTER — APPOINTMENT (OUTPATIENT)
Dept: HEMATOLOGY/ONCOLOGY | Facility: CLINIC | Age: 48
End: 2023-03-14
Payer: MEDICAID

## 2023-03-14 ENCOUNTER — OFFICE VISIT (OUTPATIENT)
Dept: HEMATOLOGY/ONCOLOGY | Facility: CLINIC | Age: 48
End: 2023-03-14
Payer: MEDICAID

## 2023-03-14 VITALS
HEART RATE: 90 BPM | SYSTOLIC BLOOD PRESSURE: 162 MMHG | TEMPERATURE: 98 F | RESPIRATION RATE: 20 BRPM | OXYGEN SATURATION: 100 % | WEIGHT: 196 LBS | HEIGHT: 65 IN | DIASTOLIC BLOOD PRESSURE: 92 MMHG | BODY MASS INDEX: 32.65 KG/M2

## 2023-03-14 DIAGNOSIS — C50.911 INFILTRATING DUCTAL CARCINOMA OF BREAST, RIGHT: ICD-10-CM

## 2023-03-14 DIAGNOSIS — K59.09 OTHER CONSTIPATION: Primary | ICD-10-CM

## 2023-03-14 LAB
ALBUMIN SERPL-MCNC: 3.9 G/DL (ref 3.5–5)
ALBUMIN/GLOB SERPL: 0.9 RATIO (ref 1.1–2)
ALP SERPL-CCNC: 149 UNIT/L (ref 40–150)
ALT SERPL-CCNC: 17 UNIT/L (ref 0–55)
AST SERPL-CCNC: 19 UNIT/L (ref 5–34)
BASOPHILS # BLD AUTO: 0.03 X10(3)/MCL (ref 0–0.2)
BASOPHILS NFR BLD AUTO: 0.5 %
BILIRUBIN DIRECT+TOT PNL SERPL-MCNC: 0.6 MG/DL
BUN SERPL-MCNC: 7.9 MG/DL (ref 7–18.7)
CALCIUM SERPL-MCNC: 10.2 MG/DL (ref 8.4–10.2)
CHLORIDE SERPL-SCNC: 103 MMOL/L (ref 98–107)
CO2 SERPL-SCNC: 33 MMOL/L (ref 22–29)
CREAT SERPL-MCNC: 0.76 MG/DL (ref 0.55–1.02)
EOSINOPHIL # BLD AUTO: 0.16 X10(3)/MCL (ref 0–0.9)
EOSINOPHIL NFR BLD AUTO: 2.4 %
ERYTHROCYTE [DISTWIDTH] IN BLOOD BY AUTOMATED COUNT: 12.1 % (ref 11.5–17)
GFR SERPLBLD CREATININE-BSD FMLA CKD-EPI: >60 MLS/MIN/1.73/M2
GLOBULIN SER-MCNC: 4.3 GM/DL (ref 2.4–3.5)
GLUCOSE SERPL-MCNC: 102 MG/DL (ref 74–100)
HCT VFR BLD AUTO: 41.5 % (ref 37–47)
HGB BLD-MCNC: 13.9 G/DL (ref 12–16)
IMM GRANULOCYTES # BLD AUTO: 0.02 X10(3)/MCL (ref 0–0.04)
IMM GRANULOCYTES NFR BLD AUTO: 0.3 %
LYMPHOCYTES # BLD AUTO: 2.66 X10(3)/MCL (ref 0.6–4.6)
LYMPHOCYTES NFR BLD AUTO: 40.3 %
MCH RBC QN AUTO: 31.2 PG
MCHC RBC AUTO-ENTMCNC: 33.5 G/DL (ref 33–36)
MCV RBC AUTO: 93.3 FL (ref 80–94)
MONOCYTES # BLD AUTO: 0.44 X10(3)/MCL (ref 0.1–1.3)
MONOCYTES NFR BLD AUTO: 6.7 %
NEUTROPHILS # BLD AUTO: 3.29 X10(3)/MCL (ref 2.1–9.2)
NEUTROPHILS NFR BLD AUTO: 49.8 %
NRBC BLD AUTO-RTO: 0 %
PLATELET # BLD AUTO: 280 X10(3)/MCL (ref 130–400)
PMV BLD AUTO: 9.8 FL (ref 7.4–10.4)
POTASSIUM SERPL-SCNC: 3.7 MMOL/L (ref 3.5–5.1)
PROT SERPL-MCNC: 8.2 GM/DL (ref 6.4–8.3)
RBC # BLD AUTO: 4.45 X10(6)/MCL (ref 4.2–5.4)
SODIUM SERPL-SCNC: 142 MMOL/L (ref 136–145)
WBC # SPEC AUTO: 6.6 X10(3)/MCL (ref 4.5–11.5)

## 2023-03-14 PROCEDURE — 4010F PR ACE/ARB THEARPY RXD/TAKEN: ICD-10-PCS | Mod: CPTII,95,, | Performed by: NURSE PRACTITIONER

## 2023-03-14 PROCEDURE — 1160F RVW MEDS BY RX/DR IN RCRD: CPT | Mod: CPTII,95,, | Performed by: NURSE PRACTITIONER

## 2023-03-14 PROCEDURE — 3008F BODY MASS INDEX DOCD: CPT | Mod: CPTII,95,, | Performed by: NURSE PRACTITIONER

## 2023-03-14 PROCEDURE — 3008F PR BODY MASS INDEX (BMI) DOCUMENTED: ICD-10-PCS | Mod: CPTII,95,, | Performed by: NURSE PRACTITIONER

## 2023-03-14 PROCEDURE — 4010F ACE/ARB THERAPY RXD/TAKEN: CPT | Mod: CPTII,95,, | Performed by: NURSE PRACTITIONER

## 2023-03-14 PROCEDURE — 1159F PR MEDICATION LIST DOCUMENTED IN MEDICAL RECORD: ICD-10-PCS | Mod: CPTII,95,, | Performed by: NURSE PRACTITIONER

## 2023-03-14 PROCEDURE — 3077F SYST BP >= 140 MM HG: CPT | Mod: CPTII,95,, | Performed by: NURSE PRACTITIONER

## 2023-03-14 PROCEDURE — 99214 PR OFFICE/OUTPT VISIT, EST, LEVL IV, 30-39 MIN: ICD-10-PCS | Mod: 95,,, | Performed by: NURSE PRACTITIONER

## 2023-03-14 PROCEDURE — 36415 COLL VENOUS BLD VENIPUNCTURE: CPT

## 2023-03-14 PROCEDURE — 99214 OFFICE O/P EST MOD 30 MIN: CPT | Mod: 95,,, | Performed by: NURSE PRACTITIONER

## 2023-03-14 PROCEDURE — 85025 COMPLETE CBC W/AUTO DIFF WBC: CPT

## 2023-03-14 PROCEDURE — 3080F DIAST BP >= 90 MM HG: CPT | Mod: CPTII,95,, | Performed by: NURSE PRACTITIONER

## 2023-03-14 PROCEDURE — 1159F MED LIST DOCD IN RCRD: CPT | Mod: CPTII,95,, | Performed by: NURSE PRACTITIONER

## 2023-03-14 PROCEDURE — 3077F PR MOST RECENT SYSTOLIC BLOOD PRESSURE >= 140 MM HG: ICD-10-PCS | Mod: CPTII,95,, | Performed by: NURSE PRACTITIONER

## 2023-03-14 PROCEDURE — 3080F PR MOST RECENT DIASTOLIC BLOOD PRESSURE >= 90 MM HG: ICD-10-PCS | Mod: CPTII,95,, | Performed by: NURSE PRACTITIONER

## 2023-03-14 PROCEDURE — 80053 COMPREHEN METABOLIC PANEL: CPT

## 2023-03-14 PROCEDURE — 1160F PR REVIEW ALL MEDS BY PRESCRIBER/CLIN PHARMACIST DOCUMENTED: ICD-10-PCS | Mod: CPTII,95,, | Performed by: NURSE PRACTITIONER

## 2023-03-14 RX ORDER — SENNOSIDES 8.6 MG/1
1 TABLET ORAL 2 TIMES DAILY
Qty: 60 TABLET | Refills: 1 | Status: SHIPPED | OUTPATIENT
Start: 2023-03-14 | End: 2023-03-30

## 2023-03-14 RX ORDER — ANASTROZOLE 1 MG/1
1 TABLET ORAL DAILY
Qty: 30 TABLET | Refills: 4 | Status: SHIPPED | OUTPATIENT
Start: 2023-03-14 | End: 2023-06-14 | Stop reason: SDUPTHER

## 2023-03-14 NOTE — PROGRESS NOTES
Problem List:  1. Stage IIIB (cT4b cN1 M0) Multifocal Infiltrating Ductal Carcinoma of the Right Breast  -Diagnosed: 10/01/2019  -1.2 cm, DCIS present, Right axillary LN positive  -ER positive (93.2%), CA positive (90.3%), HER-2 negative, Ki-67: 15.3% (borderline)    Current Treatment:  1. Lupron monthly. Started 7/28/2020.    2. Arimidex 1mg po daily to start 6-8 weeks after initiation of Lupron (late September).  Started 9/18/2020.    Treatment History:  1. Neoadjuvant DDAC x4 cycles: 12/13/2019-01/24/2020  2. Neoadjuvant Taxol 80 mg/m² IV weekly x12 cycles. Started 2/7/2020. Completed 12 cycles on 4/23/2020.  3. 05/27/2020: Simple right mastectomy, right axillary lymph node dissection  3. XRT 6/2/2020 to 8/7/2020.    History of Present Illness:  44-year-old female referred by Dr. Stephanie Silva for breast cancer.    Interval History 3/14/2023: Patient presents alone for scheduled follow breast cancer surveillance visit. She reports adherence to Arimidex, Calcium + Vitamin D daily. She reports doing well overall reports headache, vision changes, night sweats, hot flashes, numbness and tingling, and swelling in arm on right side. Patient says she still feels abdominal mass but also understands that it is not concern for malignancy and she will need to follow up with GYN. She does have an appointment scheduled for initial consultation but it is not until later this year. Patient denies any abnormal bleeding. She denies any unintentional weight loss, changes to bowel/bladder patterns, blood in urine/stool, N/V/D/C. Lab work reviewed with patient, juan. Discussed follow up schedule with patient.     Review of Systems: A complete 12-point ROS was performed in full with pertinent positives as described in interval history. Remainder of ROS done in full and are negative.    Lab Results   Component Value Date    WBC 6.6 03/14/2023    RBC 4.45 03/14/2023    HGB 13.9 03/14/2023    HCT 41.5 03/14/2023    MCV 93.3  03/14/2023    MCH 31.2 03/14/2023    MCHC 33.5 03/14/2023    RDW 12.1 03/14/2023     03/14/2023    MPV 9.8 03/14/2023     CMP  Sodium Level   Date Value Ref Range Status   03/14/2023 142 136 - 145 mmol/L Final     Potassium Level   Date Value Ref Range Status   03/14/2023 3.7 3.5 - 5.1 mmol/L Final     Carbon Dioxide   Date Value Ref Range Status   03/14/2023 33 (H) 22 - 29 mmol/L Final     Blood Urea Nitrogen   Date Value Ref Range Status   03/14/2023 7.9 7.0 - 18.7 mg/dL Final     Creatinine   Date Value Ref Range Status   03/14/2023 0.76 0.55 - 1.02 mg/dL Final     Calcium Level Total   Date Value Ref Range Status   03/14/2023 10.2 8.4 - 10.2 mg/dL Final     Albumin Level   Date Value Ref Range Status   03/14/2023 3.9 3.5 - 5.0 g/dL Final     Bilirubin Total   Date Value Ref Range Status   03/14/2023 0.6 <=1.5 mg/dL Final     Alkaline Phosphatase   Date Value Ref Range Status   03/14/2023 149 40 - 150 unit/L Final     Aspartate Aminotransferase   Date Value Ref Range Status   03/14/2023 19 5 - 34 unit/L Final     Alanine Aminotransferase   Date Value Ref Range Status   03/14/2023 17 0 - 55 unit/L Final     eGFR   Date Value Ref Range Status   03/14/2023 >60 mls/min/1.73/m2 Final       Physical Exam: Physical Exam was not completed today as this was a telemedicine visit.    Assessment:  1. Breast cancer C50.919 1. Breast cancer of lower-inner quadrant of right female breast C50.311  #Multifocal infiltrating ductal carcinoma of right breast, status post right axillary lymph node biopsy (positive: 10/01/2019)  At least 5 breast masses, multiple abnormal lymph nodes in axilla (mammogram and ultrasound 09/24/2019)  IDC, intermediate grade, at least 1.2 cm, along with DCIS; right axillary lymph node positive (biopsy 10/01/2019)  ER positive (93.2%). RI positive (90.3%). HER-2 negative (IHC, FISH testing). 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 DE positive. HER-2 negative.  -Neoadjuvant DDAC x4 (12/13/2019-01/24/2020), clinically, with good response  -Neoadjuvant weekly Taxol x12 (02/07/2020-04/23/2020)   -05/27/2020: Simple right mastectomy, right axillary lymph node dissection:  Pathology: Total right mastectomy; invasive carcinoma; multiple foci of invasive carcinoma; largest invasive carcinoma, at least 2.0 cm; overall grade 1; DCIS present, positive for extensive intraductal component; size of DCIS, 5 cm, cribriform, clinging, micropapillary, nuclear grade 3, central necrosis; invasive carcinoma margins uninvolved, 3.4 cm; DCIS margins uninvolved, 3.4 cm; number of lymph nodes examined, 20; number of sentinel lymph nodes examined, 3; number of lymph nodes with macro metastasis, 3; number of lymph nodes with micrometastasis, 2; number of lymph nodes with isolated tumor cells, 0; no extranodal extension; no definite response to presurgical therapy in the invasive carcinoma; no definite response to presurgical therapy in the metastatic carcinoma; lymphovascular invasion present  --> pT2 pN2a (2.0 cm tumor; 5 lymph nodes positive) (no definitive response to neoadjuvant chemotherapy)  2. Right axillary SLN #1, excisional biopsy: Negative for carcinoma  3 Right axillary SLN #2, excisional biopsy: 1 lymph node, positive for micrometastasis  4. Right axillary SLN #3, excisional biopsy: 1 lymph node, positive for 1 micrometastasis  5. Right axillary lymph node,  dissection permanent: 1/15 lymph nodes identified  ER positive (60%). KS positive (99%). HER-2 not overexpressed (score 0). Ki-67 low proliferation (2%)  -No metastasis (CT C/A/P, bone scan: 07/01/2020; brain MRI: 07/07/2020)  -Normal BMD (DEXA: 07/01/2020)  -11/24/2021: Bilateral diagnostic contrast-enhanced MMG with tiffany: Right Breast: Benign (BI-RADS 2) Left Breast: Benign (BI-RADS 2)   -----    # Premenopausal as of 11/11/2019 by history  -No menstrual cycles since December 2019 (after starting neoadjuvant chemotherapy)  ----    # Family history of breast cancer and uterine cancers  -12/02/2019: Genetic testing: Three (3) variants of uncertain significance (VUS): AXIN2 c.1573C>G (p.Vdl508Lak), msh3 c.2005C>T (p.Wtv164Hdq), and POLE c.1007A>G (p.Xfz222Uhm)  ----    #Exophytic fibroid versus left adnexal mass (4 cm, solid) on CT 11/22/2019 12/03/2019: Pelvic ultrasound: No discrete sonographic pathology    Plan:  -Since she has aggressive, node positive disease, therefore, for adjuvant endocrine therapy, she will benefit from ovarian suppression plus aromatase inhibitor therapy.    Recommendation: Counseling for fertility concerns if premenopausal; pregnancy test in all women of childbearing potential.  She is already status post tubal ligation.    -Significant residual disease (2 cm invasive carcinoma, 5 lymph nodes positive) post neoadjuvant chemotherapy, with no definitive response to neoadjuvant chemotherapy  -Metastasis have been ruled out  -Needs adjuvant radiation therapy completed  -Was premenopausal as of 11/11/2019, before starting neoadjuvant chemotherapy  -Lupron shots monthly    -No evidence of disease recurrence. Continue surveillance.  -Continue Lupron monthly; due today, 3/30/2023, 4/27/2023, 5/25/2023  -Follow up w/np in 3 months with lab work (cbc,cmp,mg) via TM  -Annual screening MMG bilateral due 11/2023;   -Refer to GYN for further evaluation and workup for left pelvic mass impressionable of  exophytic fibroid per prior MRI imagine 12/2021-Scheduled for 8/2/2023    Breast cancer surveillance:  -History and physical exam 1-4 times per year for 5 years, then annually;     -Mammogram every 12 months, with no clear advantage to shorter interval imaging; patient should wait 6-12 months after the completion of radiation therapy to begin their annual mammogram surveillance; suspicious findings on physical examination or surveillance imaging might warrant a shorter interval between mammograms;     -Educate, monitor, and referred for lymphedema management    -Women on an aromatase inhibitor or who experience ovarian failure secondary to treatment, should have monitoring of bone health with a bone mineral density determination at baseline and periodically thereafter (the use of a bisphosphonate, oral or IV, or denosumab is acceptable to maintain or to improve bone mineral density and reduce risk of fractures in postmenopausal (natural or induced) patients receiving adjuvant endocrine therapy. Optimal duration of therapy has not been established. Duration beyond 3 years is not known. Women treated with a bisphosphonate or denosumab should take supplemental calcium and vitamin D.    -Routine imaging of reconstructed breast is not indicated;     -In the absence of clinical signs or symptoms suggestive of recurrent disease, there is no indication for laboratory or imaging studies for metastasis screening;     -Active lifestyle, healthy diet, limited alcohol intake, and achieving and maintaining an ideal body weight (20-25 BMI) may lead to optimal breast cancer outcomes.     2. Long term (current) use of aromatase inhibitors ZSarkis Needs adjuvant endocrine therapy  For adjuvant endocrine therapy, will treat her with LHRH agonist plus aromatase inhibitor (Arimidex) for 5 years  Arimidex to start 6-8 weeks after initiation of monthly LHRH agonist shots  Baseline DEXA scan (03/21/2020, with normal BMD   Normal BMD on  baseline DEXA scan (07/01/2020)  7/7/2022 DEXA normal but showed decreased density  Advised to start taking calcium and vitamin D supplements (calcium 1200 mg daily and vitamin D3 800-1000 units daily)     Continue Arimidex daily.refills sent to pharmacy  Continue calcium + vitamin D supplementation daily.  Repeat DEXA scan in 1 years (7/2023); scheduled for 7/13/2023    3. 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 nightly started 03/18/2020     Continue gabapentin and Cymbalta for Taxol-induced peripheral neuropathy.

## 2023-03-27 RX ORDER — TAMOXIFEN CITRATE 10 MG/1
TABLET ORAL
COMMUNITY

## 2023-03-27 RX ORDER — CALCIUM 26/MAGNESIUM 15/ZINC 167MG-83MG
CAPSULE ORAL
COMMUNITY
Start: 2022-02-07 | End: 2024-01-31

## 2023-03-30 ENCOUNTER — OFFICE VISIT (OUTPATIENT)
Dept: INTERNAL MEDICINE | Facility: CLINIC | Age: 48
End: 2023-03-30
Payer: MEDICAID

## 2023-03-30 ENCOUNTER — DOCUMENTATION ONLY (OUTPATIENT)
Dept: INFUSION THERAPY | Facility: HOSPITAL | Age: 48
End: 2023-03-30
Payer: MEDICAID

## 2023-03-30 VITALS
DIASTOLIC BLOOD PRESSURE: 86 MMHG | HEART RATE: 76 BPM | SYSTOLIC BLOOD PRESSURE: 128 MMHG | TEMPERATURE: 98 F | RESPIRATION RATE: 18 BRPM | OXYGEN SATURATION: 100 % | HEIGHT: 64 IN | BODY MASS INDEX: 33.87 KG/M2 | WEIGHT: 198.38 LBS

## 2023-03-30 DIAGNOSIS — D21.9 FIBROIDS: ICD-10-CM

## 2023-03-30 DIAGNOSIS — I10 HYPERTENSION, UNSPECIFIED TYPE: Primary | ICD-10-CM

## 2023-03-30 DIAGNOSIS — Z12.11 COLON CANCER SCREENING: ICD-10-CM

## 2023-03-30 PROCEDURE — 99214 OFFICE O/P EST MOD 30 MIN: CPT | Mod: PBBFAC

## 2023-03-30 RX ORDER — ONDANSETRON 4 MG/1
TABLET, FILM COATED ORAL
Qty: 30 TABLET | Refills: 3 | Status: SHIPPED | OUTPATIENT
Start: 2023-03-30

## 2023-03-30 RX ORDER — DOCUSATE SODIUM 100 MG/1
100 CAPSULE, LIQUID FILLED ORAL 2 TIMES DAILY PRN
Qty: 90 CAPSULE | Refills: 1 | Status: SHIPPED | OUTPATIENT
Start: 2023-03-30 | End: 2023-04-18 | Stop reason: SDUPTHER

## 2023-03-30 RX ORDER — MELOXICAM 7.5 MG/1
7.5 TABLET ORAL DAILY
Qty: 30 TABLET | Refills: 1 | Status: SHIPPED | OUTPATIENT
Start: 2023-03-30 | End: 2024-02-16

## 2023-03-30 NOTE — PROGRESS NOTES
[facility] INTERNAL MEDICINE  OUTPATIENT OFFICE VISIT NOTE       Chief Complaint: Cyst (C/o movable cyst to left pelvic area that causes pain when moving), Knee Pain (C/o left knee pain with swelling noted), Pain (C/o neuropathy pain to feet), and Constipation (C/o frequent hard stools; Last BM on 3/29/2023)       HPI: Jeni Mejia is a 47 y.o. yo female with  has a past medical history of Breast cancer and HTN (hypertension)., who presents for  follow up appointment. Last appointment, patient was diagnosed with shingles and treated with antiviral therapy. She reports improvement of her symptoms with it no longer bothering her today.  Her only complaint today is some pelvic pain that comes and goes. She reports some relief with Gabapentin and Duloxetine though it causes some drowsiness. Offered to lower dose if it is too sedating but patient reports doing okay for now. She has MRI of her pelvis which was consistent with uterine fibroids. She reports not seeing a Gynecologist at this time. Referral was placed last visit though patient does not have appointment. Plan to refer patient to [facility] Gynecology so she can get evaluated for possible fibroid surgery. Patient is due for colon cancer screening and is amenable to get Cologuard so will order today.      Past Medical History:   has a past medical history of Breast cancer and HTN (hypertension).     Past Surgical History:   has a past surgical history that includes Mastectomy (Right); Augmentation of breast (Left); Breast surgery (Right); and Tubal ligation.     Family History:  family history includes Hypertension in her mother; No Known Problems in her father.     Social History:   reports that she has never smoked. She has never used smokeless tobacco. She reports that she does not currently use alcohol. She reports that she does not use drugs.     Allergies:  is allergic to sulfa (sulfonamide antibiotics) and sulfamethoxazole-trimethoprim.     Home  Medications:  Prior to Admission medications    Medication Sig Start Date End Date Taking? Authorizing Provider   calcium 26-vit D3-magnesium 15 167 mg calcium- 1.67 mcg-83 mg Cap   Calcium 1200mg plus Vitamin D3 25mcg, See Instructions, take one daily, 0 Refill(s) 2/7/22  Yes Historical Provider   anastrozole (ARIMIDEX) 1 mg Tab Take 1 tablet (1 mg total) by mouth once daily. 3/14/23   ABHIJEET Vega   calcium carbonate (OS-MARK) 600 mg calcium (1,500 mg) Tab Take 600 mg by mouth.    Historical Provider   diclofenac (VOLTAREN) 50 MG EC tablet Take 1 tablet (50 mg total) by mouth 2 (two) times daily as needed (Pain). 8/27/22   TITI Rooney   DULoxetine (CYMBALTA) 60 MG capsule Take 1 capsule (60 mg total) by mouth every evening. 8/18/22   ABHIJEET Vega   gabapentin (NEURONTIN) 400 MG capsule Take 400 mg by mouth. 2/7/22   Historical Provider   hydrOXYzine HCL (ATARAX) 10 MG Tab 10 mg 3 (three) times daily as needed. 2/7/22   Historical Provider   lisinopriL-hydrochlorothiazide (PRINZIDE,ZESTORETIC) 10-12.5 mg per tablet Take by mouth. 2/7/22 2/2/23  Historical Provider   ondansetron (ZOFRAN) 4 MG tablet TAKE 1 TABLET BY MOUTH EVERY 8 HOURS FOR UP TO 7 DAYS AS NEEDED FOR NAUSEA 3/23/22   Historical Provider   potassium chloride SA (K-DUR,KLOR-CON) 20 MEQ tablet Take 1 tablet (20 mEq total) by mouth once daily. 12/13/22   ABHIJEET Vega   senna (SENOKOT) 8.6 mg tablet Take 1 tablet by mouth 2 (two) times daily. 3/14/23   ABHIJEET Vega   tamoxifen (NOLVADEX) 10 MG Tab tamoxifen Take No date recorded No form recorded No frequency recorded No route recorded No set duration recorded No set duration amount recorded active No dosage strength recorded No dosage strength units of measure recorded    Historical Provider   valACYclovir (VALTREX) 1000 MG tablet Take 1 tablet (1,000 mg total) by mouth 3 (three) times daily. for 7 days 10/27/22 11/3/22  Andry Blank MD   vitamin D  "(VITAMIN D3) 1000 units Tab Take 1,000 Units by mouth once daily. 7/1/22   Historical Provider       ROS:  Constitutional: no fever, fatigue, weakness  Eye: no vision loss, eye redness, drainage, or pain  ENMT: no sore throat, ear pain, sinus pain/congestion, nasal congestion/drainage  Respiratory: no cough, no wheezing, no shortness of breath  Cardiovascular: no chest pain, no palpitations, no edema  Gastrointestinal: no nausea, vomiting, or diarrhea. No abdominal pain  Genitourinary: no dysuria, no urinary frequency or urgency, no hematuria; reports pelvic pain  Hema/Lymph: no abnormal bruising or bleeding  Endocrine: no heat or cold intolerance, no excessive thirst or excessive urination  Musculoskeletal: no muscle or joint pain, no joint swelling  Integumentary: no skin rash or abnormal lesion  Neurologic: no headache, no dizziness, no weakness or numbness    OBJECTIVE:     Vital signs:   /86 (BP Location: Left arm, Patient Position: Sitting, BP Method: Medium (Automatic))   Pulse 76   Temp 97.6 °F (36.4 °C) (Oral)   Resp 18   Ht 5' 4" (1.626 m)   Wt 90 kg (198 lb 6.4 oz)   SpO2 100%   BMI 34.06 kg/m²      Physical Examination:  Physical Exam  Constitutional:       Appearance: Normal appearance.   HENT:      Head: Normocephalic.      Mouth/Throat:      Mouth: Mucous membranes are moist.      Pharynx: Oropharynx is clear.   Eyes:      Conjunctiva/sclera: Conjunctivae normal.      Pupils: Pupils are equal, round, and reactive to light.   Cardiovascular:      Rate and Rhythm: Normal rate and regular rhythm.      Pulses: Normal pulses.      Heart sounds: No murmur heard.  Pulmonary:      Effort: Pulmonary effort is normal. No respiratory distress.   Chest:      Chest wall: No tenderness.   Abdominal:      General: Abdomen is flat. Bowel sounds are normal. There is no distension.      Palpations: Abdomen is soft.      Tenderness: There is no abdominal tenderness.   Musculoskeletal:         General: " Normal range of motion.      Cervical back: Normal range of motion.   Skin:     General: Skin is warm.      Capillary Refill: Capillary refill takes less than 2 seconds.   Neurological:      General: No focal deficit present.      Mental Status: She is alert and oriented to person, place, and time.        Labs:  Lab Results   Component Value Date    WBC 6.6 03/14/2023    HGB 13.9 03/14/2023    HCT 41.5 03/14/2023     03/14/2023    MCV 93.3 03/14/2023    RDW 12.1 03/14/2023    Lab Results   Component Value Date     03/14/2023    K 3.7 03/14/2023    CO2 33 (H) 03/14/2023    BUN 7.9 03/14/2023    CREATININE 0.76 03/14/2023    CALCIUM 10.2 03/14/2023    MG 2.0 07/01/2020      Lab Results   Component Value Date    HGBA1C 5.7 04/27/2022    .9 04/27/2022    CREATININE 0.76 03/14/2023    CREATRANDUR 242.1 (H) 10/12/2021    PROTEINURINE 14.4 10/12/2021    Lab Results   Component Value Date    TSH 1.4834 04/27/2022                  ASSESSMENT & PLAN:     Uterine fibroids  -Reports lower abdominal pain going on for months; does not have reported bleeding  -Transvaginal ultrasound ordered and showed lesion in the uterus most probably related to  fibroids; one of them exophytic. Exophytic fibroid appeared to be adjacent to the left ovary.  -MRI pelvis showed appearence of fibroid uterus  -Patient reports not being seen by Gynecologist; will put in referral to [facility] today as [facility] Gynecologist is booked til next year for possible fibroid surgery     Breast cancer-Stage IIIB (cT4b cN1 M0) Multifocal Infiltrating Ductal Carcinoma of the  Right Breast; s/p reconstructive surgery  -ER positive (93.2%), ME positive (90.3%), HER-2 negative, Ki-67: 15.3% (borderline)  -Lupron monthly (started July 28, 2020) and Arimidex  -S/p right mastectomy and lymph node dissection May 2020; last chemo April 2020, last  radiation August 2020. S/p liposuction and breast augmentation (2021) in [place]  -Patient being seen by   Evelio in [facility] hematology/on[facility] clinic  -Currently on Arimidex, Calcium and Vitamin D  -We will continue to follow along     Hypertension  -Well controlled; 128/86 today  -Continue HCTZ 12.5 mg-lisinopril 10 mg daily   -Continue to monitor    Shingles (resolved)  -Reports having history of burning rash with blisters about a week ago  -Seen in urgent care and was prescribed antivirals which helped to improve her pain and rash  -Rash currently crusted over and no longer weeping any fluids  -Patient okay to return to work from a medical perspective with no restrictions  -Administer Shingles vaccine at 49 yo    Health Maintenance   Topic Date Due    TETANUS VACCINE  12/07/2026    Lipid Panel  04/27/2027    Hepatitis C Screening  Completed        Health Maintenance/ Wellness  Pneumococcal vaccine: #13 (3/5/2020)  TDAP: Up-to-date 2016  Influenza vaccine: UTD 11/22  Shingrix vaccine: Not applicable; performed at age 50  Screening colonoscopy: ordered Cologuard  Pap smear: UTD 2021  Mammogram: S/P MASTECTOMY      Labs ordered: N/A  Imaging: N/A  Medications: reconciled, discussed and refills given.  RTC in 3 months      Jorge Shell MD  [facility] Internal Medicine, HO-1

## 2023-03-31 NOTE — PROGRESS NOTES
I have reviewed and concur with the resident's history, physical, assessment, and plan.  I have discussed with him all issues related to the diagnosis, workup and treatment plan. Care provided as reasonable and necessary.Hx of fibroids. Refer to gyn    Jay Jaikishen, MD [facility]

## 2023-04-04 ENCOUNTER — PATIENT MESSAGE (OUTPATIENT)
Dept: RESEARCH | Facility: HOSPITAL | Age: 48
End: 2023-04-04
Payer: MEDICAID

## 2023-04-11 ENCOUNTER — DOCUMENTATION ONLY (OUTPATIENT)
Dept: ADMINISTRATIVE | Facility: HOSPITAL | Age: 48
End: 2023-04-11
Payer: MEDICAID

## 2023-04-18 ENCOUNTER — INFUSION (OUTPATIENT)
Dept: INFUSION THERAPY | Facility: HOSPITAL | Age: 48
End: 2023-04-18
Attending: INTERNAL MEDICINE
Payer: MEDICAID

## 2023-04-18 VITALS
WEIGHT: 193.38 LBS | SYSTOLIC BLOOD PRESSURE: 128 MMHG | HEART RATE: 82 BPM | HEIGHT: 65 IN | TEMPERATURE: 99 F | OXYGEN SATURATION: 98 % | DIASTOLIC BLOOD PRESSURE: 94 MMHG | BODY MASS INDEX: 32.22 KG/M2

## 2023-04-18 DIAGNOSIS — B02.9 HERPES ZOSTER WITHOUT COMPLICATION: ICD-10-CM

## 2023-04-18 DIAGNOSIS — C50.919 MALIGNANT NEOPLASM OF FEMALE BREAST, UNSPECIFIED ESTROGEN RECEPTOR STATUS, UNSPECIFIED LATERALITY, UNSPECIFIED SITE OF BREAST: Primary | ICD-10-CM

## 2023-04-18 DIAGNOSIS — D21.9 FIBROIDS: Primary | ICD-10-CM

## 2023-04-18 PROCEDURE — 63600175 PHARM REV CODE 636 W HCPCS: Mod: JZ,JG | Performed by: INTERNAL MEDICINE

## 2023-04-18 PROCEDURE — 96402 CHEMO HORMON ANTINEOPL SQ/IM: CPT

## 2023-04-18 RX ORDER — HYDROXYZINE HYDROCHLORIDE 10 MG/1
10 TABLET, FILM COATED ORAL 3 TIMES DAILY PRN
Qty: 90 TABLET | Refills: 2 | Status: SHIPPED | OUTPATIENT
Start: 2023-04-18 | End: 2023-05-18

## 2023-04-18 RX ORDER — DOCUSATE SODIUM 100 MG/1
100 CAPSULE, LIQUID FILLED ORAL 2 TIMES DAILY PRN
Qty: 90 CAPSULE | Refills: 1 | Status: SHIPPED | OUTPATIENT
Start: 2023-04-18 | End: 2023-09-11 | Stop reason: SDUPTHER

## 2023-04-18 RX ADMIN — LEUPROLIDE ACETATE 3.75 MG: KIT at 02:04

## 2023-04-18 NOTE — TELEPHONE ENCOUNTER
----- Message from Karmen Dumas sent at 4/18/2023  3:06 PM CDT -----  Regarding: RODOLFO/MED REFILL  COLACE  HYDROXYZINE-  [facility]

## 2023-04-21 ENCOUNTER — TELEPHONE (OUTPATIENT)
Dept: INTERNAL MEDICINE | Facility: CLINIC | Age: 48
End: 2023-04-21
Payer: MEDICAID

## 2023-04-21 NOTE — TELEPHONE ENCOUNTER
Pt called, no answer , message left informed pt to call [facility] in reference to her GYN appointment , that she is in needed of in [facility] . All pt has to do is call [facility] clinic and they will schedule her an appointment there. Informed pt to call [facility] at [phone] with any questions or concerns. Call ended.

## 2023-04-21 NOTE — TELEPHONE ENCOUNTER
----- Message from Janel Gresham sent at 4/21/2023 11:38 AM CDT -----  Regarding: Dr. Shell-External GYN Referral  Good morning, in regards to the Gyn referrals for this patient she has been scheduled at the GYN clinic here for ductal carcinoma of right breast on 08/02/23. I called [facility] in [place] and spoke to Yakelin to check on the referral for fibroids and was told that GYN services are only offered in [facility] and patients do not need to be referred they only need to call and schedule an appt. Please inform patient. Thank you

## 2023-04-22 LAB — NONINV COLON CA DNA+OCC BLD SCRN STL QL: NEGATIVE

## 2023-05-16 ENCOUNTER — INFUSION (OUTPATIENT)
Dept: INFUSION THERAPY | Facility: HOSPITAL | Age: 48
End: 2023-05-16
Attending: INTERNAL MEDICINE
Payer: MEDICAID

## 2023-05-16 VITALS
WEIGHT: 195.75 LBS | DIASTOLIC BLOOD PRESSURE: 91 MMHG | TEMPERATURE: 98 F | SYSTOLIC BLOOD PRESSURE: 136 MMHG | RESPIRATION RATE: 85 BRPM | BODY MASS INDEX: 32.61 KG/M2 | HEIGHT: 65 IN | HEART RATE: 86 BPM

## 2023-05-16 DIAGNOSIS — C50.919 MALIGNANT NEOPLASM OF FEMALE BREAST, UNSPECIFIED ESTROGEN RECEPTOR STATUS, UNSPECIFIED LATERALITY, UNSPECIFIED SITE OF BREAST: Primary | ICD-10-CM

## 2023-05-16 PROCEDURE — 63600175 PHARM REV CODE 636 W HCPCS: Mod: JZ,JG | Performed by: INTERNAL MEDICINE

## 2023-05-16 PROCEDURE — 96402 CHEMO HORMON ANTINEOPL SQ/IM: CPT

## 2023-05-16 RX ADMIN — LEUPROLIDE ACETATE 3.75 MG: KIT at 08:05

## 2023-06-14 ENCOUNTER — OFFICE VISIT (OUTPATIENT)
Dept: HEMATOLOGY/ONCOLOGY | Facility: CLINIC | Age: 48
End: 2023-06-14
Payer: MEDICAID

## 2023-06-14 ENCOUNTER — INFUSION (OUTPATIENT)
Dept: INFUSION THERAPY | Facility: HOSPITAL | Age: 48
End: 2023-06-14
Attending: INTERNAL MEDICINE
Payer: MEDICAID

## 2023-06-14 VITALS
HEART RATE: 73 BPM | OXYGEN SATURATION: 98 % | RESPIRATION RATE: 20 BRPM | WEIGHT: 197.38 LBS | TEMPERATURE: 99 F | DIASTOLIC BLOOD PRESSURE: 87 MMHG | SYSTOLIC BLOOD PRESSURE: 129 MMHG | BODY MASS INDEX: 32.89 KG/M2 | HEIGHT: 65 IN

## 2023-06-14 DIAGNOSIS — C50.919 MALIGNANT NEOPLASM OF FEMALE BREAST, UNSPECIFIED ESTROGEN RECEPTOR STATUS, UNSPECIFIED LATERALITY, UNSPECIFIED SITE OF BREAST: Primary | ICD-10-CM

## 2023-06-14 DIAGNOSIS — C50.911 INFILTRATING DUCTAL CARCINOMA OF BREAST, RIGHT: ICD-10-CM

## 2023-06-14 PROCEDURE — 1159F PR MEDICATION LIST DOCUMENTED IN MEDICAL RECORD: ICD-10-PCS | Mod: CPTII,,, | Performed by: NURSE PRACTITIONER

## 2023-06-14 PROCEDURE — 99214 OFFICE O/P EST MOD 30 MIN: CPT | Mod: PBBFAC | Performed by: NURSE PRACTITIONER

## 2023-06-14 PROCEDURE — 3074F PR MOST RECENT SYSTOLIC BLOOD PRESSURE < 130 MM HG: ICD-10-PCS | Mod: CPTII,,, | Performed by: NURSE PRACTITIONER

## 2023-06-14 PROCEDURE — 99214 PR OFFICE/OUTPT VISIT, EST, LEVL IV, 30-39 MIN: ICD-10-PCS | Mod: S$PBB,,, | Performed by: NURSE PRACTITIONER

## 2023-06-14 PROCEDURE — 3079F DIAST BP 80-89 MM HG: CPT | Mod: CPTII,,, | Performed by: NURSE PRACTITIONER

## 2023-06-14 PROCEDURE — 63600175 PHARM REV CODE 636 W HCPCS: Mod: JZ,JG | Performed by: INTERNAL MEDICINE

## 2023-06-14 PROCEDURE — 3008F BODY MASS INDEX DOCD: CPT | Mod: CPTII,,, | Performed by: NURSE PRACTITIONER

## 2023-06-14 PROCEDURE — 96402 CHEMO HORMON ANTINEOPL SQ/IM: CPT

## 2023-06-14 PROCEDURE — 1160F PR REVIEW ALL MEDS BY PRESCRIBER/CLIN PHARMACIST DOCUMENTED: ICD-10-PCS | Mod: CPTII,,, | Performed by: NURSE PRACTITIONER

## 2023-06-14 PROCEDURE — 4010F PR ACE/ARB THEARPY RXD/TAKEN: ICD-10-PCS | Mod: CPTII,,, | Performed by: NURSE PRACTITIONER

## 2023-06-14 PROCEDURE — 1159F MED LIST DOCD IN RCRD: CPT | Mod: CPTII,,, | Performed by: NURSE PRACTITIONER

## 2023-06-14 PROCEDURE — 1160F RVW MEDS BY RX/DR IN RCRD: CPT | Mod: CPTII,,, | Performed by: NURSE PRACTITIONER

## 2023-06-14 PROCEDURE — 4010F ACE/ARB THERAPY RXD/TAKEN: CPT | Mod: CPTII,,, | Performed by: NURSE PRACTITIONER

## 2023-06-14 PROCEDURE — 3008F PR BODY MASS INDEX (BMI) DOCUMENTED: ICD-10-PCS | Mod: CPTII,,, | Performed by: NURSE PRACTITIONER

## 2023-06-14 PROCEDURE — 3074F SYST BP LT 130 MM HG: CPT | Mod: CPTII,,, | Performed by: NURSE PRACTITIONER

## 2023-06-14 PROCEDURE — 3079F PR MOST RECENT DIASTOLIC BLOOD PRESSURE 80-89 MM HG: ICD-10-PCS | Mod: CPTII,,, | Performed by: NURSE PRACTITIONER

## 2023-06-14 PROCEDURE — 99214 OFFICE O/P EST MOD 30 MIN: CPT | Mod: S$PBB,,, | Performed by: NURSE PRACTITIONER

## 2023-06-14 RX ORDER — ANASTROZOLE 1 MG/1
1 TABLET ORAL DAILY
Qty: 30 TABLET | Refills: 4 | Status: SHIPPED | OUTPATIENT
Start: 2023-06-14 | End: 2023-09-07 | Stop reason: SDUPTHER

## 2023-06-14 RX ADMIN — LEUPROLIDE ACETATE 3.75 MG: KIT at 02:06

## 2023-06-14 NOTE — PROGRESS NOTES
Problem List:  1. Stage IIIB (cT4b cN1 M0) Multifocal Infiltrating Ductal Carcinoma of the Right Breast  -Diagnosed: 10/01/2019  -1.2 cm, DCIS present, Right axillary LN positive  -ER positive (93.2%), IN positive (90.3%), HER-2 negative, Ki-67: 15.3% (borderline)    Current Treatment:  1. Lupron monthly. Started 7/28/2020.    2. Arimidex 1mg po daily to start 6-8 weeks after initiation of Lupron (late September).  Started 9/18/2020.    Treatment History:  1. Neoadjuvant DDAC x4 cycles: 12/13/2019-01/24/2020  2. Neoadjuvant Taxol 80 mg/m² IV weekly x12 cycles. Started 2/7/2020. Completed 12 cycles on 4/23/2020.  3. 05/27/2020: Simple right mastectomy, right axillary lymph node dissection  3. XRT 6/2/2020 to 8/7/2020.    History of Present Illness:  44-year-old female referred by Dr. Stephanie Silva for breast cancer.    Interval History 6/14/2023: Patient presents alone for scheduled follow breast cancer surveillance visit. She reports adherence to Arimidex, Calcium + Vitamin D daily. She is due to receive Lupron injection today. She reports doing well overall reports headache, hot flashes, Left lower pelvic pain, bloating, and swelling underarm on right side. She does have an appointment scheduled with GYN for iniital consultation concerning adnexal mass found in 2021. Patient denies any abnormal bleeding. She denies any unintentional weight loss, changes to bowel/bladder patterns, blood in urine/stool, N/V/D/C. Lab work reviewed with patient, juan. She is UTD with mammogram and DEXA scan. Discussed follow up schedule with patient.     Review of Systems: A complete 12-point ROS was performed in full with pertinent positives as described in interval history. Remainder of ROS done in full and are negative.    Lab Results   Component Value Date    WBC 6.79 06/14/2023    RBC 4.06 (L) 06/14/2023    HGB 12.9 06/14/2023    HCT 37.9 06/14/2023    MCV 93.3 06/14/2023    MCH 31.8 (H) 06/14/2023    MCHC 34.0 06/14/2023     RDW 12.5 06/14/2023     06/14/2023    MPV 10.3 06/14/2023     CMP  Sodium Level   Date Value Ref Range Status   06/14/2023 144 136 - 145 mmol/L Final     Potassium Level   Date Value Ref Range Status   06/14/2023 3.7 3.5 - 5.1 mmol/L Final     Carbon Dioxide   Date Value Ref Range Status   06/14/2023 31 (H) 22 - 29 mmol/L Final     Blood Urea Nitrogen   Date Value Ref Range Status   06/14/2023 12.3 7.0 - 18.7 mg/dL Final     Creatinine   Date Value Ref Range Status   06/14/2023 0.86 0.55 - 1.02 mg/dL Final     Calcium Level Total   Date Value Ref Range Status   06/14/2023 9.6 8.4 - 10.2 mg/dL Final     Albumin Level   Date Value Ref Range Status   06/14/2023 3.7 3.5 - 5.0 g/dL Final     Bilirubin Total   Date Value Ref Range Status   06/14/2023 0.5 <=1.5 mg/dL Final     Alkaline Phosphatase   Date Value Ref Range Status   06/14/2023 148 40 - 150 unit/L Final     Aspartate Aminotransferase   Date Value Ref Range Status   06/14/2023 18 5 - 34 unit/L Final     Alanine Aminotransferase   Date Value Ref Range Status   06/14/2023 16 0 - 55 unit/L Final     eGFR   Date Value Ref Range Status   06/14/2023 >60 mls/min/1.73/m2 Final       Physical Exam:   General: Alert and oriented. NAD  Eye: Pupils are equal, round and reactive to light, Extraocular movements are intact. Normal conjunctiva  HENT: Normocephalic. Oropharynx exam deferred; mask in place due to coronavirus  Neck: Supple, Non-tender  Respiratory: Respirations are non-labored, Symmetrical chest wall expansion. Breath sounds CTA bilaterally  Cardiovascular: Regular rate, rhythm, Normal peripheral perfusion, No bilateral lower extremity edema  Gastrointestinal: Non-distended, Present bowel sounds   Genitourinary: Exam deferred  Lymphatics: No lymphadenopathy appreciated  Musculoskeletal: Moves all extremities  Integumentary: Intact. Warm, dry. No rashes, or lesions to visible skin  Neurologic: No focal deficits  Psychiatric: Cooperative. Appropriate mood and  affect   ECOG Performance Scale: 0 - Capable of all self-care no restrictions      Assessment:  1. Breast cancer C50.919 1. Breast cancer of lower-inner quadrant of right female breast C50.311  #Multifocal infiltrating ductal carcinoma of right breast, status post right axillary lymph node biopsy (positive: 10/01/2019)  At least 5 breast masses, multiple abnormal lymph nodes in axilla (mammogram and ultrasound 09/24/2019)  IDC, intermediate grade, at least 1.2 cm, along with DCIS; right axillary lymph node positive (biopsy 10/01/2019)  ER positive (93.2%). ND positive (90.3%). HER-2 negative (IHC, FISH testing). 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 ND positive. HER-2 negative.  -Neoadjuvant DDAC x4 (12/13/2019-01/24/2020), clinically, with good response  -Neoadjuvant weekly Taxol x12 (02/07/2020-04/23/2020)   -05/27/2020: Simple right mastectomy, right axillary lymph node dissection:  Pathology: Total right mastectomy; invasive carcinoma; multiple foci of invasive carcinoma; largest invasive carcinoma, at least 2.0 cm; overall grade 1; DCIS present, positive for extensive intraductal component; size of DCIS, 5 cm, cribriform, clinging, micropapillary, nuclear grade 3, central necrosis; invasive carcinoma margins uninvolved, 3.4 cm; DCIS margins uninvolved, 3.4 cm; number of lymph nodes examined, 20; number of sentinel lymph nodes examined, 3; number  of lymph nodes with macro metastasis, 3; number of lymph nodes with micrometastasis, 2; number of lymph nodes with isolated tumor cells, 0; no extranodal extension; no definite response to presurgical therapy in the invasive carcinoma; no definite response to presurgical therapy in the metastatic carcinoma; lymphovascular invasion present  --> pT2 pN2a (2.0 cm tumor; 5 lymph nodes positive) (no definitive response to neoadjuvant chemotherapy)  2. Right axillary SLN #1, excisional biopsy: Negative for carcinoma  3 Right axillary SLN #2, excisional biopsy: 1 lymph node, positive for micrometastasis  4. Right axillary SLN #3, excisional biopsy: 1 lymph node, positive for 1 micrometastasis  5. Right axillary lymph node, dissection permanent: 1/15 lymph nodes identified  ER positive (60%). MS positive (99%). HER-2 not overexpressed (score 0). Ki-67 low proliferation (2%)  -No metastasis (CT C/A/P, bone scan: 07/01/2020; brain MRI: 07/07/2020)  -Normal BMD (DEXA: 07/01/2020)  -11/24/2021: Bilateral diagnostic contrast-enhanced MMG with tiffany: Right Breast: Benign (BI-RADS 2) Left Breast: Benign (BI-RADS 2)   -----    # Premenopausal as of 11/11/2019 by history  -No menstrual cycles since December 2019 (after starting neoadjuvant chemotherapy)  ----    # Family history of breast cancer and uterine cancers  -12/02/2019: Genetic testing: Three (3) variants of uncertain significance (VUS): AXIN2 c.1573C>G (p.Qsj701Xbs), msh3 c.2005C>T (p.Ljw000Enf), and POLE c.1007A>G (p.Bac502Ktu)  ----    #Exophytic fibroid versus left adnexal mass (4 cm, solid) on CT 11/22/2019 12/03/2019: Pelvic ultrasound: No discrete sonographic pathology    Plan:  -Since she has aggressive, node positive disease, therefore, for adjuvant endocrine therapy, she will benefit from ovarian suppression plus aromatase inhibitor therapy.    Recommendation: Counseling for fertility concerns if premenopausal; pregnancy test in all women of childbearing  potential.  She is already status post tubal ligation.    -Significant residual disease (2 cm invasive carcinoma, 5 lymph nodes positive) post neoadjuvant chemotherapy, with no definitive response to neoadjuvant chemotherapy  -Metastasis have been ruled out  -Needs adjuvant radiation therapy completed  -Was premenopausal as of 11/11/2019, before starting neoadjuvant chemotherapy  -Lupron shots monthly    -No evidence of disease recurrence. Continue surveillance.  -Continue Lupron monthly; due today next due (7/12, 8/16/9/13)  -Follow up w/np in 3 months with lab work (cbc,cmp,mg) via TM  -Annual screening MMG contrast enhanced bilateral due 1/2024;   -Refer to GYN for further evaluation and workup for left pelvic mass impressionable of exophytic fibroid per prior MRI imagine 12/2021-Scheduled for 8/2/2023    Breast cancer surveillance:  -History and physical exam 1-4 times per year for 5 years, then annually;     -Mammogram every 12 months, with no clear advantage to shorter interval imaging; patient should wait 6-12 months after the completion of radiation therapy to begin their annual mammogram surveillance; suspicious findings on physical examination or surveillance imaging might warrant a shorter interval between mammograms;     -Educate, monitor, and referred for lymphedema management    -Women on an aromatase inhibitor or who experience ovarian failure secondary to treatment, should have monitoring of bone health with a bone mineral density determination at baseline and periodically thereafter (the use of a bisphosphonate, oral or IV, or denosumab is acceptable to maintain or to improve bone mineral density and reduce risk of fractures in postmenopausal (natural or induced) patients receiving adjuvant endocrine therapy. Optimal duration of therapy has not been established. Duration beyond 3 years is not known. Women treated with a bisphosphonate or denosumab should take supplemental calcium and vitamin  D.    -Routine imaging of reconstructed breast is not indicated;     -In the absence of clinical signs or symptoms suggestive of recurrent disease, there is no indication for laboratory or imaging studies for metastasis screening;     -Active lifestyle, healthy diet, limited alcohol intake, and achieving and maintaining an ideal body weight (20-25 BMI) may lead to optimal breast cancer outcomes.     2. Long term (current) use of aromatase inhibitors AREN Needs adjuvant endocrine therapy  For adjuvant endocrine therapy, will treat her with LHRH agonist plus aromatase inhibitor (Arimidex) for 5 years  Arimidex to start 6-8 weeks after initiation of monthly LHRH agonist shots  Baseline DEXA scan (03/21/2020, with normal BMD   Normal BMD on baseline DEXA scan (07/01/2020)  7/7/2022 DEXA normal but showed decreased density  Advised to start taking calcium and vitamin D supplements (calcium 1200 mg daily and vitamin D3 800-1000 units daily)     Continue Arimidex daily.refills sent to pharmacy  Continue calcium + vitamin D supplementation daily.  Repeat DEXA scan in 1 years (7/2023); scheduled for 7/13/2023    3. 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 nightly started 03/18/2020     Continue gabapentin and Cymbalta for Taxol-induced peripheral neuropathy.

## 2023-06-14 NOTE — Clinical Note
Infusion lupron injection Infusion monthly every 4 weeks for lupron no lab work  Fu w/np in 3 months with lab work + infusion for lupron injection

## 2023-07-13 ENCOUNTER — INFUSION (OUTPATIENT)
Dept: INFUSION THERAPY | Facility: HOSPITAL | Age: 48
End: 2023-07-13
Attending: INTERNAL MEDICINE
Payer: MEDICAID

## 2023-07-13 ENCOUNTER — HOSPITAL ENCOUNTER (OUTPATIENT)
Dept: RADIOLOGY | Facility: HOSPITAL | Age: 48
Discharge: HOME | End: 2023-07-13
Attending: NURSE PRACTITIONER
Payer: MEDICAID

## 2023-07-13 VITALS
RESPIRATION RATE: 20 BRPM | SYSTOLIC BLOOD PRESSURE: 141 MMHG | DIASTOLIC BLOOD PRESSURE: 94 MMHG | HEART RATE: 96 BPM | OXYGEN SATURATION: 97 % | WEIGHT: 197.81 LBS | BODY MASS INDEX: 32.96 KG/M2 | TEMPERATURE: 98 F

## 2023-07-13 DIAGNOSIS — C50.919 MALIGNANT NEOPLASM OF FEMALE BREAST, UNSPECIFIED ESTROGEN RECEPTOR STATUS, UNSPECIFIED LATERALITY, UNSPECIFIED SITE OF BREAST: Primary | ICD-10-CM

## 2023-07-13 DIAGNOSIS — C50.919 MALIGNANT NEOPLASM OF FEMALE BREAST, UNSPECIFIED ESTROGEN RECEPTOR STATUS, UNSPECIFIED LATERALITY, UNSPECIFIED SITE OF BREAST: ICD-10-CM

## 2023-07-13 PROCEDURE — 96402 CHEMO HORMON ANTINEOPL SQ/IM: CPT

## 2023-07-13 PROCEDURE — 63600175 PHARM REV CODE 636 W HCPCS: Mod: JZ,JG | Performed by: INTERNAL MEDICINE

## 2023-07-13 PROCEDURE — 77080 DXA BONE DENSITY AXIAL: CPT | Mod: TC

## 2023-07-13 RX ADMIN — LEUPROLIDE ACETATE 3.75 MG: KIT at 10:07

## 2023-07-13 NOTE — PLAN OF CARE
Lupron RGDANIEL given ; c/o blurred vision; numbness to B) feet; pain L) Flank area  Told her to report to the ER to be further evaluated but she refused

## 2023-07-18 ENCOUNTER — OFFICE VISIT (OUTPATIENT)
Dept: INTERNAL MEDICINE | Facility: CLINIC | Age: 48
End: 2023-07-18
Payer: MEDICAID

## 2023-07-18 VITALS
OXYGEN SATURATION: 98 % | HEART RATE: 79 BPM | HEIGHT: 64 IN | BODY MASS INDEX: 33.26 KG/M2 | RESPIRATION RATE: 18 BRPM | SYSTOLIC BLOOD PRESSURE: 134 MMHG | WEIGHT: 194.81 LBS | DIASTOLIC BLOOD PRESSURE: 93 MMHG | TEMPERATURE: 99 F

## 2023-07-18 DIAGNOSIS — R20.0 NUMBNESS AND TINGLING: ICD-10-CM

## 2023-07-18 DIAGNOSIS — R20.2 NUMBNESS AND TINGLING: ICD-10-CM

## 2023-07-18 DIAGNOSIS — D21.9 FIBROIDS: ICD-10-CM

## 2023-07-18 DIAGNOSIS — I10 HYPERTENSION, UNSPECIFIED TYPE: Primary | ICD-10-CM

## 2023-07-18 DIAGNOSIS — R61 DIAPHORESIS: ICD-10-CM

## 2023-07-18 PROCEDURE — 99214 OFFICE O/P EST MOD 30 MIN: CPT | Mod: PBBFAC

## 2023-07-18 NOTE — PROGRESS NOTES
[facility] INTERNAL MEDICINE  OUTPATIENT OFFICE VISIT NOTE       Chief Complaint: Follow-up (Patient states she has been H/A, swelling, blurry vision, nausea, and elevated BP.)       HPI: Jeni Mejia is a 47 y.o. yo female with  has a past medical history of Breast cancer and HTN (hypertension)., who presents for  follow up appointment. Patient complains of onset blurry vision, retention, abdominal fullness, and numbness and tingling in her left lower extremity.  She reports symptoms began about 2 weeks ago and remained relatively stable.  Of note she was recently seen by Heme-Onc and had received her usual chemotherapy and injection.  She is currently receiving Lupron and Arimidex which she has been taking for the past 2 years.  She reports symptoms occur randomly throughout the day but notices it more in the morning.  She reports this has never happened to her in the past.  She denies any recent trauma, fevers, chills, chest pain, constipation, diarrhea, or recent sick contacts.  Note, patient had previous MRI of her pelvis in the past which revealed exophytic mass her fundus suspicious for fibroids.  She reports recently having an ultrasound performed of that area and was told that it was not cancer, but unable to find records of that report.  Patient does appointment with Gynecology on August 2nd and will continue to follow further assessment of this issue.      Past Medical History:   has a past medical history of Breast cancer and HTN (hypertension).     Past Surgical History:   has a past surgical history that includes Mastectomy (Right); Augmentation of breast (Left); Breast surgery (Right); and Tubal ligation.     Family History:  family history includes Hypertension in her mother; No Known Problems in her father.     Social History:   reports that she has never smoked. She has never used smokeless tobacco. She reports that she does not currently use alcohol. She reports that she does not use drugs.      Allergies:  is allergic to sulfa (sulfonamide antibiotics) and sulfamethoxazole-trimethoprim.     Home Medications:  Prior to Admission medications    Medication Sig Start Date End Date Taking? Authorizing Provider   anastrozole (ARIMIDEX) 1 mg Tab Take 1 tablet (1 mg total) by mouth once daily. 6/14/23  Yes ABHIJEET Vega   calcium 26-vit D3-magnesium 15 167 mg calcium- 1.67 mcg-83 mg Cap   Calcium 1200mg plus Vitamin D3 25mcg, See Instructions, take one daily, 0 Refill(s) 2/7/22  Yes Historical Provider   docusate sodium (COLACE) 100 MG capsule Take 1 capsule (100 mg total) by mouth 2 (two) times daily as needed for Constipation. 4/18/23  Yes Jorge Shell MD   DULoxetine (CYMBALTA) 60 MG capsule Take 1 capsule (60 mg total) by mouth every evening. 8/18/22  Yes ABHIJEET Vega   gabapentin (NEURONTIN) 400 MG capsule Take 400 mg by mouth. 2/7/22  Yes Historical Provider   lisinopriL-hydrochlorothiazide (PRINZIDE,ZESTORETIC) 10-12.5 mg per tablet Take by mouth. 2/7/22 7/18/23 Yes Historical Provider   meloxicam (MOBIC) 7.5 MG tablet Take 1 tablet (7.5 mg total) by mouth once daily. Take 1 tablet as needed for pain daily 3/30/23  Yes Jorge Shell MD   ondansetron (ZOFRAN) 4 MG tablet TAKE 1 TABLET BY MOUTH EVERY 8 HOURS AS NEEDED FOR NAUSEA 3/30/23  Yes Jorge Shell MD   tamoxifen (NOLVADEX) 10 MG Tab tamoxifen Take No date recorded No form recorded No frequency recorded No route recorded No set duration recorded No set duration amount recorded active No dosage strength recorded No dosage strength units of measure recorded   Yes Historical Provider   calcium carbonate (OS-MARK) 600 mg calcium (1,500 mg) Tab Take 600 mg by mouth.    Historical Provider   vitamin D (VITAMIN D3) 1000 units Tab Take 1,000 Units by mouth once daily. 7/1/22   Historical Provider       ROS:  Constitutional: no fever, fatigue, weakness/ diaphoresis  Eye: no vision loss, eye redness, drainage, or  "pain  ENMT: no sore throat, ear pain, sinus pain/congestion, nasal congestion/drainage  Respiratory: no cough, no wheezing, no shortness of breath  Cardiovascular: no chest pain, no palpitations, no edema  Gastrointestinal: no nausea, vomiting, or diarrhea. Abdominal fullness and tenderness of left side; not flank  Genitourinary: no dysuria, no urinary frequency or urgency, no hematuria  Hema/Lymph: no abnormal bruising or bleeding  Endocrine: no heat or cold intolerance, no excessive thirst or excessive urination  Musculoskeletal: no muscle or joint pain, no joint swelling  Integumentary: no skin rash or abnormal lesion  Neurologic: no headache, no dizziness, no weakness; numbness and tingling of left lower extremity  OBJECTIVE:     Vital signs:   BP (!) 134/93 (BP Location: Left arm, Patient Position: Sitting, BP Method: Medium (Automatic))   Pulse 79   Temp 98.5 °F (36.9 °C) (Oral)   Resp 18   Ht 5' 4" (1.626 m)   Wt 88.4 kg (194 lb 12.8 oz)   LMP  (LMP Unknown)   SpO2 98%   BMI 33.44 kg/m²      Physical Examination:  Physical Exam  HENT:      Head: Normocephalic and atraumatic.      Mouth/Throat:      Mouth: Mucous membranes are moist.      Pharynx: Oropharynx is clear.   Eyes:      Conjunctiva/sclera: Conjunctivae normal.      Pupils: Pupils are equal, round, and reactive to light.   Cardiovascular:      Rate and Rhythm: Normal rate and regular rhythm.      Pulses: Normal pulses.      Heart sounds: No murmur heard.  Pulmonary:      Effort: Pulmonary effort is normal. No respiratory distress.      Breath sounds: No wheezing.   Chest:      Chest wall: No tenderness.   Abdominal:      General: Abdomen is flat. Bowel sounds are normal. There is no distension.      Palpations: Abdomen is soft.      Tenderness: There is no abdominal tenderness.   Musculoskeletal:         General: No swelling. Normal range of motion.      Cervical back: Normal range of motion.      Comments: Tenderness to palpation on left " side; no tenderness to back area   Skin:     General: Skin is warm.   Neurological:      General: No focal deficit present.      Mental Status: She is alert and oriented to person, place, and time.        Labs:  Lab Results   Component Value Date    WBC 6.21 07/18/2023    HGB 13.1 07/18/2023    HCT 39.3 07/18/2023     07/18/2023    MCV 94.9 (H) 07/18/2023    RDW 12.4 07/18/2023    Lab Results   Component Value Date     07/18/2023    K 3.4 (L) 07/18/2023    CO2 32 (H) 07/18/2023    BUN 12.0 07/18/2023    CREATININE 0.82 07/18/2023    CALCIUM 9.7 07/18/2023    MG 1.80 07/18/2023      Lab Results   Component Value Date    HGBA1C 5.7 04/27/2022    .9 04/27/2022    CREATININE 0.82 07/18/2023    CREATRANDUR 242.1 (H) 10/12/2021    PROTEINURINE 14.4 10/12/2021    Lab Results   Component Value Date    TSH 1.4834 04/27/2022                  ASSESSMENT & PLAN:     B-type symptoms  -Complains of diaphoresis, blurry vision, abdominal pain, and numbness and tingling of left lower extremity  -Low suspicion for chemotherapy treatment side effect at this time as patient has been receiving same treatment for 2 years  -Hx of exophytic fibroid mass found on MRI in the past; reports recent US but unable to find in our system  -Has appointment with Gynecology 8/2/23; further evaluation of mass at that time  -Will order B12, TSH, and cortisol at this time to further assess    Uterine fibroids  -Reports lower abdominal pain going on for months; does not have reported bleeding  -Transvaginal ultrasound ordered and showed lesion in the uterus most probably related to fibroids; one of them exophytic. Exophytic fibroid appeared to be adjacent to the left ovary.  -MRI pelvis showed appearence of fibroid uterus  -Patient has appointment with Gynecology scheduled for the 2nd of August; we will continue to follow up with results of that visit     Breast cancer-Stage IIIB (cT4b cN1 M0) Multifocal Infiltrating Ductal Carcinoma  of the  Right Breast; s/p reconstructive surgery  -ER positive (93.2%), NH positive (90.3%), HER-2 negative, Ki-67: 15.3% (borderline)  -Lupron monthly (started July 28, 2020) and Arimidex  -S/p right mastectomy and lymph node dissection May 2020; last chemo April 2020, last  radiation August 2020. S/p liposuction and breast augmentation (2021) in [place]  -Patient being seen by Dr. Fraser in [facility] hematology[facility]oncology clinic  -Currently on Arimidex, Calcium and Vitamin D  -We will continue to follow along     Hypertension  -Well controlled; 134/93 today  -Continue HCTZ 12.5 mg-lisinopril 10 mg daily   -Continue to monitor    Health Maintenance   Topic Date Due    TETANUS VACCINE  12/07/2026    Lipid Panel  04/27/2027    Hepatitis C Screening  Completed        Health Maintenance/ Wellness  Pneumococcal vaccine: #13 (3/5/2020)  TDAP: Up-to-date 2016  Influenza vaccine: UTD 11/22  Shingrix vaccine: Not applicable; performed at age 50  Screening colonoscopy: Cologuard negative  Pap smear: UTD 2021  Mammogram: S/P MASTECTOMY      Labs ordered: TSH, B12, Cortisol  Imaging: N/A  Medications: reconciled, discussed and refills given.  RTC in 1 months      Jorge Shell MD  [facility], -

## 2023-07-26 ENCOUNTER — HOSPITAL ENCOUNTER (OUTPATIENT)
Dept: RADIOLOGY | Facility: HOSPITAL | Age: 48
Discharge: HOME | End: 2023-07-26
Payer: MEDICAID

## 2023-07-26 DIAGNOSIS — D21.9 FIBROIDS: ICD-10-CM

## 2023-07-26 PROCEDURE — 76856 US EXAM PELVIC COMPLETE: CPT | Mod: TC

## 2023-08-02 ENCOUNTER — OFFICE VISIT (OUTPATIENT)
Dept: GYNECOLOGY | Facility: CLINIC | Age: 48
End: 2023-08-02
Payer: MEDICAID

## 2023-08-02 VITALS
HEIGHT: 64 IN | TEMPERATURE: 98 F | WEIGHT: 197 LBS | RESPIRATION RATE: 20 BRPM | SYSTOLIC BLOOD PRESSURE: 118 MMHG | DIASTOLIC BLOOD PRESSURE: 85 MMHG | BODY MASS INDEX: 33.63 KG/M2 | OXYGEN SATURATION: 100 % | HEART RATE: 78 BPM

## 2023-08-02 DIAGNOSIS — C50.911 INFILTRATING DUCTAL CARCINOMA OF BREAST, RIGHT: ICD-10-CM

## 2023-08-02 DIAGNOSIS — R19.00 PELVIC MASS: ICD-10-CM

## 2023-08-02 DIAGNOSIS — R30.0 DYSURIA: Primary | ICD-10-CM

## 2023-08-02 DIAGNOSIS — R10.2 PELVIC PAIN: ICD-10-CM

## 2023-08-02 LAB
APPEARANCE UR: CLEAR
BACTERIA #/AREA URNS AUTO: ABNORMAL /HPF
BILIRUB UR QL STRIP.AUTO: NEGATIVE
CLUE CELLS VAG QL WET PREP: ABNORMAL
COLOR UR: ABNORMAL
GLUCOSE UR QL STRIP.AUTO: NORMAL
HYALINE CASTS #/AREA URNS LPF: ABNORMAL /LPF
KETONES UR QL STRIP.AUTO: NEGATIVE
LEUKOCYTE ESTERASE UR QL STRIP.AUTO: 250
MUCOUS THREADS URNS QL MICRO: ABNORMAL /LPF
NITRITE UR QL STRIP.AUTO: NEGATIVE
PH UR STRIP.AUTO: 5.5 [PH]
PROT UR QL STRIP.AUTO: ABNORMAL
RBC #/AREA URNS AUTO: ABNORMAL /HPF
RBC UR QL AUTO: NEGATIVE
SP GR UR STRIP.AUTO: 1.02
SQUAMOUS #/AREA URNS LPF: ABNORMAL /HPF
T VAGINALIS VAG QL WET PREP: ABNORMAL
UROBILINOGEN UR STRIP-ACNC: NORMAL
WBC #/AREA URNS AUTO: ABNORMAL /HPF
WBC #/AREA VAG WET PREP: ABNORMAL
YEAST SPEC QL WET PREP: ABNORMAL

## 2023-08-02 PROCEDURE — 81001 URINALYSIS AUTO W/SCOPE: CPT

## 2023-08-02 PROCEDURE — 87210 SMEAR WET MOUNT SALINE/INK: CPT

## 2023-08-02 PROCEDURE — 99214 OFFICE O/P EST MOD 30 MIN: CPT | Mod: PBBFAC

## 2023-08-02 NOTE — PROGRESS NOTES
[facility] OB/GYN CLINIC NOTE  [facility]  [address]  [place] [address]  Phone: [phone]  Fax: [phone]    Subjective:     Jeni Mejia is a 47 y.o.  who presents  for  LLQ  pain       The LLQ pain started about 1 yuear ago . She describes it as tightening when she streches her left flank. Worsens with  movement. Feels like a darien horse. Noted bloating. Increase in constipation .Denies weight loss . Pt has a hx of R breast cancer , s/p mastectomy, with lymp node excision diagnosed in . She's Takes Tamoxifen and placed on Lupron . Denies vaginal bleeding since chemothereapy in 2020.     Pt states that she is having issues with urinating  for the past few month . States that she gets up 4x a night with urgency. Keeps a bottle water by the bed. There are some times where she does not makti to the bathroom. Large volume voids but doesn't feel like shes completely emptying.         OBHx:  OB History    Para Term  AB Living   2 2   2   2   SAB IAB Ectopic Multiple Live Births           2      # Outcome Date GA Lbr Cem/2nd Weight Sex Delivery Anes PTL Lv   2      M Vag-Spont   DENIS   1      F Vag-Spont   DENIS   Vaginal x 2 , BTL 20 years ago     GynHx:    LMP   Denies hx of STI    MedHx:   Past Medical History:   Diagnosis Date    Breast cancer     HTN (hypertension)        SurgHx:   Past Surgical History:   Procedure Laterality Date    AUGMENTATION OF BREAST Left     BREAST SURGERY Right     tram flap of rt breast    MASTECTOMY Right     TUBAL LIGATION         Medications:   Current Outpatient Medications   Medication Instructions    anastrozole (ARIMIDEX) 1 mg, Oral, Daily    calcium 26-vit D3-magnesium 15 167 mg calcium- 1.67 mcg-83 mg Cap   Calcium 1200mg plus Vitamin D3 25mcg, See Instructions, take one daily, 0 Refill(s)    calcium carbonate (OS-MARK) 600 mg, Oral    docusate sodium (COLACE) 100 mg, Oral, 2 times daily PRN    DULoxetine (CYMBALTA) 60 mg,  "Oral, Nightly    gabapentin (NEURONTIN) 400 mg, Oral    lisinopriL-hydrochlorothiazide (PRINZIDE,ZESTORETIC) 10-12.5 mg per tablet Oral    meloxicam (MOBIC) 7.5 mg, Oral, Daily, Take 1 tablet as needed for pain daily    ondansetron (ZOFRAN) 4 MG tablet TAKE 1 TABLET BY MOUTH EVERY 8 HOURS AS NEEDED FOR NAUSEA    tamoxifen (NOLVADEX) 10 MG Tab tamoxifen Take No date recorded No form recorded No frequency recorded No route recorded No set duration recorded No set duration amount recorded active No dosage strength recorded No dosage strength units of measure recorded    vitamin D (VITAMIN D3) 1,000 Units, Oral, Daily       FM Hx:   Family History   Problem Relation Age of Onset    Hypertension Mother     No Known Problems Father       Denies hx of ovarian, uterine, endometrial, or colon cancer.    Social Hx:   Social History     Tobacco Use    Smoking status: Never    Smokeless tobacco: Never   Substance Use Topics    Alcohol use: Not Currently     Comment: occassionally    Drug use: Never      Denies tobacco, alcohol and illicit drug usage.    Review of Systems  Denies fevers, chills, headache, blurry vision, nausea, vomiting, dizziness, or syncope.Denies chest pain, shortness of breath, RUQ pain, or calf pain.    Objective:     Vitals:    08/02/23 0827   BP: 118/85   BP Location: Left arm   Patient Position: Sitting   BP Method: Medium (Automatic)   Pulse: 78   Resp: 20   Temp: 98.2 °F (36.8 °C)   TempSrc: Oral   SpO2: 100%   Weight: 89.4 kg (197 lb)   Height: 5' 4" (1.626 m)     Body mass index is 33.81 kg/m².    Physical Exam:     General: alert and oriented, in no acute distress  Abdomen: Soft, non-distended, moderate tender to palpation, no involuntary guarding, no rebound tenderness normoactive bowel sounds. Hip to hip low transverse incision   Breast: tenderness to palpation bilaterally . No LAD noted bilaterally. No masses palpated, healing scares from mastectomy and breast reconstruction. R breast without " nipple. L breast normal nipple without discharge   Extremities: Normal, atraumatic, non-edematous, moderately tender, bilaterally   External genitalia: Atropic female genitalia without lesion,labia minora fusion, no abnormal discharge. Atrophic appearing urethral meatus. Normal appearing external anus.  Bimanual Exam: No pelvic lymphadenopathy noted bilaterally. Vagina with 2 cm under pubic arch. 3 cm width between ischial spines. Uterus difficult to palpate due to abdominal pain and tight abdominal fascia., no cervical motion tenderness. No adnexal masses. Descent inadequate .  Speculum Exam: atrophic vaginal mucosa without lesions, masses or erythema.Pale . Cervix well visualized flush with the vagina, smooth in contour no masses or lesions. Os normal in appearance, no blood or discharge coming from the os  Pelvic pain exam: + introitus, levator ani, obturator internus, pirformis, bladder neck, urethra bilatearlly     Note: RN chaperone present for entirety of exam    Labs  Recent Results (from the past 24 hour(s))   Urinalysis, Reflex to Urine Culture    Collection Time: 08/02/23  9:55 AM    Specimen: Urine   Result Value Ref Range    Color, UA Light-Yellow Yellow, Light-Yellow, Dark Yellow, Radha, Straw    Appearance, UA Clear Clear    Specific Gravity, UA 1.025     pH, UA 5.5 5.0 - 8.5    Protein, UA Trace (A) Negative    Glucose, UA Normal Negative, Normal    Ketones, UA Negative Negative    Blood, UA Negative Negative    Bilirubin, UA Negative Negative    Urobilinogen, UA Normal 0.2, 1.0, Normal    Nitrites, UA Negative Negative    Leukocyte Esterase,  (A) Negative    WBC, UA 6-10 (A) None Seen, 0-2, 3-5, 0-5 /HPF    Bacteria, UA Trace (A) None Seen /HPF    Squamous Epithelial Cells, UA Moderate (A) None Seen /HPF    Mucous, UA Trace (A) None Seen /LPF    Hyaline Casts, UA None Seen None Seen /lpf    RBC, UA 0-5 None Seen, 0-2, 3-5, 0-5 /HPF   Wet Prep, Genital    Collection Time: 08/02/23  9:55 AM     Specimen: Cervicovaginal; Genital   Result Value Ref Range    WBC, Wet Prep None Seen None Seen    Clue Cells, Wet Prep Rare (A) None Seen    Trichomonas, Wet Prep None Seen None Seen    Yeast, Wet Prep None Seen None Seen         Imaging  TVUS 2023:   Size: 7.6 x 3.2 x 4.3 cm     Masses: 2 focal lesions are identified 1 measuring 3 cm x 2.5 x 2.5 cm previously measuring 3.2 x 2.5 x 2.4 cm essentially stable as compared with the previous exam 2nd 1 measuring 1 cm x 7 mm x 9 mm previously measuring 9 mm x 1.1 x 7 mm stable as compared with the last exam     Endometrium: Endometrial stripe measures approximately 2 mm     Right ovary:     Size: 2.6 x 1.9 x 2.1 cm     Appearance: Normal     Vascular flow: Normal.     Left ovary:     Size: 2 x 2.2 x 1.0 cm     Appearance: Normal     Vascular Flow: Normal.     Free Fluid:     Trace amount of fluid identified     Impression:     Uterine fibroids stable as compared with the last exam of .    HCM:   Paps:    2021: NILM, hrHPV negative   Mammogram: followed with breast oncology   Colonoscopy: [facility] 2 months ago     Assessment:   47 y.o.  here for LLQ pain . Pt exam + for generalized tenderness.     Plan:     Problem List Items Addressed This Visit          Renal/    Dysuria - Primary     - UA collected to rule out infection   - Discussed post-chemotherapy changes to estrogenized tissue   - dysuria may be a component of interstitial cystitis    Discussed IC diet : decrease in acidic foods and coffee          Relevant Orders    Urinalysis, Reflex to Urine Culture (Completed)       GI    Pelvic pain     - Pt exam was positive for generalized tenderness ( palpation of breast, abdomen, extremities, and back)  - Physical exam with impressive sxs of atrophy and pelvic floor dysfunction  -Discussed PFT to improve pelvic floor myalgia. Sent referral   - Pt is not a candidate for hormonal treatment of vaginal atrophy   Discussed OTC vaginal moisturizers such as  replen    - collected wet prep and UA to rule out infection          Relevant Orders    Wet Prep, Genital (Completed)    Ambulatory referral/consult to Physical/Occupational Therapy     Other Visit Diagnoses       Infiltrating ductal carcinoma of breast, right        Pelvic mass              Telehealth call in 3 months to evaluate improvement in pelvic pain       Patient and plan were discussed with Dr. Mejia .    Trinity Santana MD   [facility], PGY2

## 2023-08-03 ENCOUNTER — TELEPHONE (OUTPATIENT)
Dept: GYNECOLOGY | Facility: CLINIC | Age: 48
End: 2023-08-03
Payer: MEDICAID

## 2023-08-03 RX ORDER — AMOXICILLIN AND CLAVULANATE POTASSIUM 875; 125 MG/1; MG/1
1 TABLET, FILM COATED ORAL EVERY 12 HOURS
Qty: 14 TABLET | Refills: 0 | Status: SHIPPED | OUTPATIENT
Start: 2023-08-03 | End: 2023-08-10

## 2023-08-03 NOTE — TELEPHONE ENCOUNTER
Called the patient to discuss UA. There was 250 LE on UA and pt reported dysuria. PT endorsed understanding     - Will send Augmentin 875 BID for 7 days     Trinity Santana MD   [facility], PGY2

## 2023-08-03 NOTE — ASSESSMENT & PLAN NOTE
- UA collected to rule out infection   - Discussed post-chemotherapy changes to estrogenized tissue   - dysuria may be a component of interstitial cystitis    Discussed IC diet : decrease in acidic foods and coffee

## 2023-08-03 NOTE — ASSESSMENT & PLAN NOTE
- Pt exam was positive for generalized tenderness ( palpation of breast, abdomen, extremities, and back)  - Physical exam with impressive sxs of atrophy and pelvic floor dysfunction  -Discussed PFT to improve pelvic floor myalgia. Sent referral   - Pt is not a candidate for hormonal treatment of vaginal atrophy   Discussed OTC vaginal moisturizers such as replens    -TVUS 07/2023:   Small fibroids stable from 2021  - collected wet prep and UA to rule out infection

## 2023-08-10 ENCOUNTER — INFUSION (OUTPATIENT)
Dept: INFUSION THERAPY | Facility: HOSPITAL | Age: 48
End: 2023-08-10
Attending: INTERNAL MEDICINE
Payer: MEDICAID

## 2023-08-10 VITALS
RESPIRATION RATE: 14 BRPM | WEIGHT: 196.38 LBS | DIASTOLIC BLOOD PRESSURE: 105 MMHG | SYSTOLIC BLOOD PRESSURE: 123 MMHG | TEMPERATURE: 99 F | OXYGEN SATURATION: 99 % | BODY MASS INDEX: 33.53 KG/M2 | HEART RATE: 68 BPM | HEIGHT: 64 IN

## 2023-08-10 DIAGNOSIS — C50.919 MALIGNANT NEOPLASM OF FEMALE BREAST, UNSPECIFIED ESTROGEN RECEPTOR STATUS, UNSPECIFIED LATERALITY, UNSPECIFIED SITE OF BREAST: Primary | ICD-10-CM

## 2023-08-10 PROCEDURE — 96402 CHEMO HORMON ANTINEOPL SQ/IM: CPT

## 2023-08-10 PROCEDURE — 63600175 PHARM REV CODE 636 W HCPCS: Mod: JZ,JG | Performed by: INTERNAL MEDICINE

## 2023-08-10 RX ADMIN — LEUPROLIDE ACETATE 3.75 MG: KIT at 02:08

## 2023-08-10 NOTE — NURSING
13:46 Arrive for Lupron q 4 wk Inj c/o of tingling to bilateral foot. Blood pressure elevated (123/105) patient verbalizes not taking anti-hypertensive medication today. Given printed future scheduled appointments. Next Lupron Inj scheduled for 9/7/23.

## 2023-09-07 ENCOUNTER — OFFICE VISIT (OUTPATIENT)
Dept: HEMATOLOGY/ONCOLOGY | Facility: CLINIC | Age: 48
End: 2023-09-07
Payer: MEDICAID

## 2023-09-07 ENCOUNTER — INFUSION (OUTPATIENT)
Dept: INFUSION THERAPY | Facility: HOSPITAL | Age: 48
End: 2023-09-07
Attending: INTERNAL MEDICINE
Payer: MEDICAID

## 2023-09-07 VITALS
OXYGEN SATURATION: 99 % | TEMPERATURE: 98 F | HEART RATE: 72 BPM | SYSTOLIC BLOOD PRESSURE: 142 MMHG | DIASTOLIC BLOOD PRESSURE: 91 MMHG | RESPIRATION RATE: 20 BRPM

## 2023-09-07 VITALS
HEIGHT: 64 IN | HEART RATE: 86 BPM | OXYGEN SATURATION: 99 % | RESPIRATION RATE: 20 BRPM | WEIGHT: 196 LBS | DIASTOLIC BLOOD PRESSURE: 79 MMHG | BODY MASS INDEX: 33.46 KG/M2 | TEMPERATURE: 98 F | SYSTOLIC BLOOD PRESSURE: 122 MMHG

## 2023-09-07 DIAGNOSIS — C50.919 MALIGNANT NEOPLASM OF FEMALE BREAST, UNSPECIFIED ESTROGEN RECEPTOR STATUS, UNSPECIFIED LATERALITY, UNSPECIFIED SITE OF BREAST: Primary | ICD-10-CM

## 2023-09-07 DIAGNOSIS — G62.9 NEUROPATHY: ICD-10-CM

## 2023-09-07 DIAGNOSIS — C50.911 INFILTRATING DUCTAL CARCINOMA OF BREAST, RIGHT: Primary | ICD-10-CM

## 2023-09-07 PROCEDURE — 96402 CHEMO HORMON ANTINEOPL SQ/IM: CPT

## 2023-09-07 PROCEDURE — 3008F PR BODY MASS INDEX (BMI) DOCUMENTED: ICD-10-PCS | Mod: CPTII,,, | Performed by: NURSE PRACTITIONER

## 2023-09-07 PROCEDURE — 1160F RVW MEDS BY RX/DR IN RCRD: CPT | Mod: CPTII,,, | Performed by: NURSE PRACTITIONER

## 2023-09-07 PROCEDURE — 1160F PR REVIEW ALL MEDS BY PRESCRIBER/CLIN PHARMACIST DOCUMENTED: ICD-10-PCS | Mod: CPTII,,, | Performed by: NURSE PRACTITIONER

## 2023-09-07 PROCEDURE — 4010F ACE/ARB THERAPY RXD/TAKEN: CPT | Mod: CPTII,,, | Performed by: NURSE PRACTITIONER

## 2023-09-07 PROCEDURE — 99215 OFFICE O/P EST HI 40 MIN: CPT | Mod: S$PBB,,, | Performed by: NURSE PRACTITIONER

## 2023-09-07 PROCEDURE — 63600175 PHARM REV CODE 636 W HCPCS: Mod: JZ,JG | Performed by: INTERNAL MEDICINE

## 2023-09-07 PROCEDURE — 99214 OFFICE O/P EST MOD 30 MIN: CPT | Mod: PBBFAC | Performed by: NURSE PRACTITIONER

## 2023-09-07 PROCEDURE — 3074F SYST BP LT 130 MM HG: CPT | Mod: CPTII,,, | Performed by: NURSE PRACTITIONER

## 2023-09-07 PROCEDURE — 3078F DIAST BP <80 MM HG: CPT | Mod: CPTII,,, | Performed by: NURSE PRACTITIONER

## 2023-09-07 PROCEDURE — 3008F BODY MASS INDEX DOCD: CPT | Mod: CPTII,,, | Performed by: NURSE PRACTITIONER

## 2023-09-07 PROCEDURE — 4010F PR ACE/ARB THEARPY RXD/TAKEN: ICD-10-PCS | Mod: CPTII,,, | Performed by: NURSE PRACTITIONER

## 2023-09-07 PROCEDURE — 3078F PR MOST RECENT DIASTOLIC BLOOD PRESSURE < 80 MM HG: ICD-10-PCS | Mod: CPTII,,, | Performed by: NURSE PRACTITIONER

## 2023-09-07 PROCEDURE — 1159F MED LIST DOCD IN RCRD: CPT | Mod: CPTII,,, | Performed by: NURSE PRACTITIONER

## 2023-09-07 PROCEDURE — 99215 PR OFFICE/OUTPT VISIT, EST, LEVL V, 40-54 MIN: ICD-10-PCS | Mod: S$PBB,,, | Performed by: NURSE PRACTITIONER

## 2023-09-07 PROCEDURE — 3074F PR MOST RECENT SYSTOLIC BLOOD PRESSURE < 130 MM HG: ICD-10-PCS | Mod: CPTII,,, | Performed by: NURSE PRACTITIONER

## 2023-09-07 PROCEDURE — 1159F PR MEDICATION LIST DOCUMENTED IN MEDICAL RECORD: ICD-10-PCS | Mod: CPTII,,, | Performed by: NURSE PRACTITIONER

## 2023-09-07 RX ORDER — ANASTROZOLE 1 MG/1
1 TABLET ORAL DAILY
Qty: 30 TABLET | Refills: 4 | Status: SHIPPED | OUTPATIENT
Start: 2023-09-07 | End: 2024-01-29 | Stop reason: SDUPTHER

## 2023-09-07 RX ORDER — HYDROXYZINE HYDROCHLORIDE 10 MG/1
10 TABLET, FILM COATED ORAL
COMMUNITY
Start: 2023-08-10

## 2023-09-07 RX ADMIN — LEUPROLIDE ACETATE 3.75 MG: KIT at 03:09

## 2023-09-07 NOTE — Clinical Note
infusion for Lupron today infusion lupron injection on 10/5, 11/2, 11/30, 12/28 fu w/np in 3 months with lab work cbc,cmp,mg + infusion for lupron injection

## 2023-09-07 NOTE — PROGRESS NOTES
Problem List:  1. Stage IIIB (cT4b cN1 M0) Multifocal Infiltrating Ductal Carcinoma of the Right Breast  -Diagnosed: 10/01/2019  -1.2 cm, DCIS present, Right axillary LN positive  -ER positive (93.2%), GA positive (90.3%), HER-2 negative, Ki-67: 15.3% (borderline)    Current Treatment:  1. Lupron monthly. Started 7/28/2020.    2. Arimidex 1mg po daily to start 6-8 weeks after initiation of Lupron (late September).  Started 9/18/2020.    Treatment History:  1. Neoadjuvant DDAC x4 cycles: 12/13/2019-01/24/2020  2. Neoadjuvant Taxol 80 mg/m² IV weekly x12 cycles. Started 2/7/2020. Completed 12 cycles on 4/23/2020.  3. 05/27/2020: Simple right mastectomy, right axillary lymph node dissection  3. XRT 6/2/2020 to 8/7/2020.    History of Present Illness:  44-year-old female referred by Dr. Stephanie Silva for breast cancer.    Interval History 9/7/2023: Patient presents alone for scheduled follow breast cancer surveillance visit. She reports adherence to Arimidex, Calcium + Vitamin D daily. She is due to receive Lupron injection today. She reports doing well overall reports headache, hot flashes, Left lower pelvic pain, bloating, numbness and tingling. Patient says numbness and tingling is managed well with Cymbalta at bedtime, she says she occasssionally takes Gabapentin.Patient says that she was seen by GYN as referred and told she has pelvic atrophy of which now requires physical therapy. She has started physical therapy. Patient denies any abnormal bleeding. She denies any unintentional weight loss, changes to bowel/bladder patterns, blood in urine/stool, N/V/D/C. Lab work reviewed with patientjuan. She is UTD with mammogram and DEXA scan.  She also reports that she has several teeth that has started breaking. She is currently seeing a dentist and determining the next course of action. Lab work reviewed with patientjuan. Discussed follow up schedule with patient.     Review of Systems: A complete 12-point ROS  was performed in full with pertinent positives as described in interval history. Remainder of ROS done in full and are negative.    Lab Results   Component Value Date    WBC 6.20 09/07/2023    RBC 4.13 (L) 09/07/2023    HGB 12.7 09/07/2023    HCT 38.6 09/07/2023    MCV 93.5 09/07/2023    MCH 30.8 09/07/2023    MCHC 32.9 (L) 09/07/2023    RDW 12.0 09/07/2023     09/07/2023    MPV 10.3 09/07/2023     CMP  Sodium   Date Value Ref Range Status   07/12/2021 143 135 - 146 mmol/L Final     Sodium Level   Date Value Ref Range Status   09/07/2023 143 136 - 145 mmol/L Final     Potassium   Date Value Ref Range Status   07/12/2021 3.7 3.6 - 5.2 mmol/L Final     Potassium Level   Date Value Ref Range Status   09/07/2023 3.7 3.5 - 5.1 mmol/L Final     Carbon Dioxide   Date Value Ref Range Status   09/07/2023 29 22 - 29 mmol/L Final   07/12/2021 34 (H) 24 - 32 mmol/L Final     BUN   Date Value Ref Range Status   07/12/2021 12.0 7.0 - 25.0 mg/dL Final     Blood Urea Nitrogen   Date Value Ref Range Status   09/07/2023 10.8 7.0 - 18.7 mg/dL Final     Creatinine   Date Value Ref Range Status   09/07/2023 0.78 0.55 - 1.02 mg/dL Final   07/12/2021 0.75 0.50 - 1.10 mg/dL Final     Calcium   Date Value Ref Range Status   07/12/2021 10.6 (H) 8.4 - 10.3 mg/dL Final     Calcium Level Total   Date Value Ref Range Status   09/07/2023 9.7 8.4 - 10.2 mg/dL Final     Albumin   Date Value Ref Range Status   07/12/2021 3.8 3.4 - 5.0 g/dL Final     Albumin Level   Date Value Ref Range Status   09/07/2023 3.6 3.5 - 5.0 g/dL Final     Total Bilirubin   Date Value Ref Range Status   07/12/2021 0.5 <1.3 mg/dL Final     Bilirubin Total   Date Value Ref Range Status   09/07/2023 0.4 <=1.5 mg/dL Final     Alkaline Phosphatase   Date Value Ref Range Status   09/07/2023 142 40 - 150 unit/L Final     AST   Date Value Ref Range Status   07/12/2021 19 <45 U/L Final     Aspartate Aminotransferase   Date Value Ref Range Status   09/07/2023 22 5 - 34  unit/L Final     ALT   Date Value Ref Range Status   07/12/2021 16 <46 U/L Final     Alanine Aminotransferase   Date Value Ref Range Status   09/07/2023 19 0 - 55 unit/L Final     eGFR   Date Value Ref Range Status   09/07/2023 >60 mls/min/1.73/m2 Final       Physical Exam:   General: Alert and oriented. NAD  Eye: Pupils are equal, round and reactive to light, Extraocular movements are intact. Normal conjunctiva  HENT: Normocephalic. Oropharynx exam deferred;   Neck: Supple, Non-tender  Respiratory: Respirations are non-labored, Symmetrical chest wall expansion.   Cardiovascular: Regular rate, rhythm, Normal peripheral perfusion, No bilateral lower extremity edema  Gastrointestinal: Non-distended,   Genitourinary: Exam deferred  Lymphatics: No lymphadenopathy appreciated  Musculoskeletal: Moves all extremities  Integumentary: Intact. Warm, dry. No rashes, or lesions to visible skin  Neurologic: No focal deficits  Psychiatric: Cooperative. Appropriate mood and affect   ECOG Performance Scale: 0 - Capable of all self-care no restrictions      Assessment:  1. Breast cancer C50.919 1. Breast cancer of lower-inner quadrant of right female breast C50.311  #Multifocal infiltrating ductal carcinoma of right breast, status post right axillary lymph node biopsy (positive: 10/01/2019)  At least 5 breast masses, multiple abnormal lymph nodes in axilla (mammogram and ultrasound 09/24/2019)  IDC, intermediate grade, at least 1.2 cm, along with DCIS; right axillary lymph node positive (biopsy 10/01/2019)  ER positive (93.2%). [place] positive (90.3%). HER-2 negative (IHC, FISH testing). 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 IA positive. HER-2 negative.  -Neoadjuvant DDAC x4 (12/13/2019-01/24/2020), clinically, with good response  -Neoadjuvant weekly Taxol x12 (02/07/2020-04/23/2020)   -05/27/2020: Simple right mastectomy, right axillary lymph node dissection:  Pathology: Total right mastectomy; invasive carcinoma; multiple foci of invasive carcinoma; largest invasive carcinoma, at least 2.0 cm; overall grade 1; DCIS present, positive for extensive intraductal component; size of DCIS, 5 cm, cribriform, clinging, micropapillary, nuclear grade 3, central necrosis; invasive carcinoma margins uninvolved, 3.4 cm; DCIS margins uninvolved, 3.4 cm; number of lymph nodes examined, 20; number of sentinel lymph nodes examined, 3; number of lymph nodes with macro metastasis, 3; number of lymph nodes with micrometastasis, 2; number of lymph nodes with isolated tumor cells, 0; no extranodal extension; no definite response to presurgical therapy in the invasive carcinoma; no definite response to presurgical therapy in the metastatic carcinoma; lymphovascular invasion present  --> pT2 pN2a (2.0 cm tumor; 5 lymph nodes positive) (no definitive response to neoadjuvant chemotherapy)  2. Right axillary SLN #1, excisional biopsy: Negative for carcinoma  3 Right axillary SLN #2, excisional biopsy: 1 lymph node, positive for micrometastasis  4. Right axillary SLN #3, excisional biopsy: 1 lymph node, positive for 1 micrometastasis  5. Right axillary lymph node, dissection permanent: 1/15 lymph nodes identified  ER positive (60%). IA positive (99%). HER-2 not overexpressed (score 0). Ki-67 low proliferation (2%)  -No metastasis (CT C/A/P, bone scan: 07/01/2020; brain MRI: 07/07/2020)  -Normal BMD  (DEXA: 07/01/2020)  -11/24/2021: Bilateral diagnostic contrast-enhanced MMG with tiffany: Right Breast: Benign (BI-RADS 2) Left Breast: Benign (BI-RADS 2)   -----    # Premenopausal as of 11/11/2019 by history  -No menstrual cycles since December 2019 (after starting neoadjuvant chemotherapy)  ----    # Family history of breast cancer and uterine cancers  -12/02/2019: Genetic testing: Three (3) variants of uncertain significance (VUS): AXIN2 c.1573C>G (p.Ppw319Vlt), msh3 c.2005C>T (p.Ehv875Ymv), and POLE c.1007A>G (p.Qru587Juo)  ----    #Exophytic fibroid versus left adnexal mass (4 cm, solid) on CT 11/22/2019 12/03/2019: Pelvic ultrasound: No discrete sonographic pathology    Plan:  -Since she has aggressive, node positive disease, therefore, for adjuvant endocrine therapy, she will benefit from ovarian suppression plus aromatase inhibitor therapy.    Recommendation: Counseling for fertility concerns if premenopausal; pregnancy test in all women of childbearing potential.  She is already status post tubal ligation.    -Significant residual disease (2 cm invasive carcinoma, 5 lymph nodes positive) post neoadjuvant chemotherapy, with no definitive response to neoadjuvant chemotherapy  -Metastasis have been ruled out  -Needs adjuvant radiation therapy completed  -Was premenopausal as of 11/11/2019, before starting neoadjuvant chemotherapy  -Lupron shots monthly    -No evidence of disease recurrence. Continue surveillance.  -Continue Lupron monthly; due today next due 10/5  -Follow up w/np in 3 months with lab work (cbc,cmp,mg) prior to Lupron injection  -Annual screening MMG contrast enhanced bilateral due 1/2024;   -Continue follow up with Dentist for teeth breaking  -Continue follow up with GYN for pelvic atrophy      Breast cancer surveillance:  -History and physical exam 1-4 times per year for 5 years, then annually;     -Mammogram every 12 months, with no clear advantage to shorter interval imaging; patient should  wait 6-12 months after the completion of radiation therapy to begin their annual mammogram surveillance; suspicious findings on physical examination or surveillance imaging might warrant a shorter interval between mammograms;     -Educate, monitor, and referred for lymphedema management    -Women on an aromatase inhibitor or who experience ovarian failure secondary to treatment, should have monitoring of bone health with a bone mineral density determination at baseline and periodically thereafter (the use of a bisphosphonate, oral or IV, or denosumab is acceptable to maintain or to improve bone mineral density and reduce risk of fractures in postmenopausal (natural or induced) patients receiving adjuvant endocrine therapy. Optimal duration of therapy has not been established. Duration beyond 3 years is not known. Women treated with a bisphosphonate or denosumab should take supplemental calcium and vitamin D.    -Routine imaging of reconstructed breast is not indicated;     -In the absence of clinical signs or symptoms suggestive of recurrent disease, there is no indication for laboratory or imaging studies for metastasis screening;     -Active lifestyle, healthy diet, limited alcohol intake, and achieving and maintaining an ideal body weight (20-25 BMI) may lead to optimal breast cancer outcomes.     2. Long term (current) use of aromatase inhibitors Z79.811 Needs adjuvant endocrine therapy  For adjuvant endocrine therapy, will treat her with LHRH agonist plus aromatase inhibitor (Arimidex) for 5 years  Arimidex to start 6-8 weeks after initiation of monthly LHRH agonist shots  Baseline DEXA scan (03/21/2020, with normal BMD   Normal BMD on baseline DEXA scan (07/01/2020)  7/7/2022 DEXA normal but showed decreased density  Advised to start taking calcium and vitamin D supplements (calcium 1200 mg daily and vitamin D3 800-1000 units daily)     Continue Arimidex daily.refills sent to pharmacy  Continue calcium +  vitamin D supplementation daily.  Repeat DEXA scan in 1 years (7/2024)    3. 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 nightly started 03/18/2020     -Continue gabapentin and Cymbalta for Taxol-induced peripheral neuropathy.     Pt presents to NP visit with complaints of fatigue, numbness in feet, frequent urintation

## 2023-09-07 NOTE — NURSING
Patient into infusion for Lupron injection. Injection given RVG, tolerated well. Blood pressure noted to be elevated. Out of BP meds, going to provider Monday. Directed patient to ER if any headaches, visual changes, shortness of breath, back/shoulder/chest pain. She stated understanding.

## 2023-09-11 ENCOUNTER — OFFICE VISIT (OUTPATIENT)
Dept: INTERNAL MEDICINE | Facility: CLINIC | Age: 48
End: 2023-09-11
Payer: MEDICAID

## 2023-09-11 VITALS
WEIGHT: 198.63 LBS | OXYGEN SATURATION: 100 % | SYSTOLIC BLOOD PRESSURE: 126 MMHG | BODY MASS INDEX: 35.2 KG/M2 | TEMPERATURE: 98 F | DIASTOLIC BLOOD PRESSURE: 74 MMHG | RESPIRATION RATE: 18 BRPM | HEART RATE: 71 BPM | HEIGHT: 63 IN

## 2023-09-11 DIAGNOSIS — R20.2 NUMBNESS AND TINGLING: ICD-10-CM

## 2023-09-11 DIAGNOSIS — C50.911 INFILTRATING DUCTAL CARCINOMA OF BREAST, RIGHT: ICD-10-CM

## 2023-09-11 DIAGNOSIS — D21.9 FIBROIDS: ICD-10-CM

## 2023-09-11 DIAGNOSIS — I10 HYPERTENSION, UNSPECIFIED TYPE: Primary | ICD-10-CM

## 2023-09-11 DIAGNOSIS — R20.0 NUMBNESS AND TINGLING: ICD-10-CM

## 2023-09-11 PROCEDURE — 99214 OFFICE O/P EST MOD 30 MIN: CPT | Mod: PBBFAC

## 2023-09-11 RX ORDER — LISINOPRIL AND HYDROCHLOROTHIAZIDE 10; 12.5 MG/1; MG/1
1 TABLET ORAL DAILY
Qty: 90 TABLET | Refills: 3 | Status: SHIPPED | OUTPATIENT
Start: 2023-09-11 | End: 2024-09-05

## 2023-09-11 RX ORDER — GABAPENTIN 400 MG/1
400 CAPSULE ORAL 3 TIMES DAILY
Qty: 120 CAPSULE | Refills: 3 | Status: SHIPPED | OUTPATIENT
Start: 2023-09-11 | End: 2024-01-29 | Stop reason: SDUPTHER

## 2023-09-11 RX ORDER — DOCUSATE SODIUM 100 MG/1
100 CAPSULE, LIQUID FILLED ORAL 2 TIMES DAILY PRN
Qty: 90 CAPSULE | Refills: 1 | Status: SHIPPED | OUTPATIENT
Start: 2023-09-11

## 2023-09-11 RX ORDER — DULOXETIN HYDROCHLORIDE 60 MG/1
60 CAPSULE, DELAYED RELEASE ORAL NIGHTLY
Qty: 30 CAPSULE | Refills: 4 | Status: SHIPPED | OUTPATIENT
Start: 2023-09-11 | End: 2024-01-29 | Stop reason: SDUPTHER

## 2023-09-11 NOTE — PROGRESS NOTES
[facility] INTERNAL MEDICINE  OUTPATIENT OFFICE VISIT NOTE       Chief Complaint: Follow-up (Patient states her blood pressure has been elevated.)       HPI: Jeni Mejia is a 48 y.o. yo female with  has a past medical history of Breast cancer and HTN (hypertension)., who presents for  follow-up appointment .  Patient continues to complain of B type symptoms specifically abdominal bloating, numbness and tingling in her extremities, and occasional blurry vision.  Patient has had labs drawn during her last visit which showed normal B12, TSH, and normal cortisol.  Of note, patient is continuing to receive Lupron chemotherapy with Heme-Onc at this time.  Patient denies having any fevers, chills, chest pain, abdominal pain, dysuria, or diarrhea at this time.  Patient reports running out of medications recently and was unable to get her medications refilled.  Educated patient that she can always call the clinic and I will be able to refill her medications at any time.  Patient reports recently being tested for COVID and flu at her workplace which came back negative.  At this time, suspect that patient's symptoms are chemotherapy-induced but will continue to monitor for improvement.  Patient has no other complaints at this time and is amenable to this plan.      Past Medical History:   has a past medical history of Breast cancer and HTN (hypertension).     Past Surgical History:   has a past surgical history that includes Mastectomy (Right); Augmentation of breast (Left); Breast surgery (Right); and Tubal ligation.     Family History:  family history includes Hypertension in her mother; No Known Problems in her father.     Social History:   reports that she has never smoked. She has never used smokeless tobacco. She reports that she does not currently use alcohol. She reports that she does not use drugs.     Allergies:  is allergic to sulfa (sulfonamide antibiotics) and sulfamethoxazole-trimethoprim.     Home  Medications:  Prior to Admission medications    Medication Sig Start Date End Date Taking? Authorizing Provider   anastrozole (ARIMIDEX) 1 mg Tab Take 1 tablet (1 mg total) by mouth once daily. 9/7/23  Yes Kasia Jerry FNP   calcium 26-vit D3-magnesium 15 167 mg calcium- 1.67 mcg-83 mg Cap   Calcium 1200mg plus Vitamin D3 25mcg, See Instructions, take one daily, 0 Refill(s) 2/7/22  Yes Provider, Historical   calcium carbonate (OS-MARK) 600 mg calcium (1,500 mg) Tab Take 600 mg by mouth.   Yes Provider, Historical   meloxicam (MOBIC) 7.5 MG tablet Take 1 tablet (7.5 mg total) by mouth once daily. Take 1 tablet as needed for pain daily 3/30/23  Yes Jorge Shell MD   ondansetron (ZOFRAN) 4 MG tablet TAKE 1 TABLET BY MOUTH EVERY 8 HOURS AS NEEDED FOR NAUSEA 3/30/23  Yes Jorge Shell MD   tamoxifen (NOLVADEX) 10 MG Tab tamoxifen Take No date recorded No form recorded No frequency recorded No route recorded No set duration recorded No set duration amount recorded active No dosage strength recorded No dosage strength units of measure recorded   Yes Provider, Historical   vitamin D (VITAMIN D3) 1000 units Tab Take 1,000 Units by mouth once daily. 7/1/22  Yes Provider, Historical   docusate sodium (COLACE) 100 MG capsule Take 1 capsule (100 mg total) by mouth 2 (two) times daily as needed for Constipation. 4/18/23 9/11/23 Yes Jorge Shell MD   DULoxetine (CYMBALTA) 60 MG capsule Take 1 capsule (60 mg total) by mouth every evening. 8/18/22 9/11/23 Yes Kasia Jerry FNP   gabapentin (NEURONTIN) 400 MG capsule Take 400 mg by mouth. 2/7/22 9/11/23 Yes Provider, Historical   lisinopriL-hydrochlorothiazide (PRINZIDE,ZESTORETIC) 10-12.5 mg per tablet Take by mouth. 2/7/22 9/11/23 Yes Provider, Historical   docusate sodium (COLACE) 100 MG capsule Take 1 capsule (100 mg total) by mouth 2 (two) times daily as needed for Constipation. 9/11/23   Jorge Shell MD   DULoxetine (CYMBALTA) 60  "MG capsule Take 1 capsule (60 mg total) by mouth every evening. 9/11/23   Jorge Shell MD   gabapentin (NEURONTIN) 400 MG capsule Take 1 capsule (400 mg total) by mouth 3 (three) times daily. 9/11/23   Jorge Shell MD   hydrOXYzine HCL (ATARAX) 10 MG Tab Take 10 mg by mouth. 8/10/23   Provider, Historical   lisinopriL-hydrochlorothiazide (PRINZIDE,ZESTORETIC) 10-12.5 mg per tablet Take 1 tablet by mouth once daily. 9/11/23 9/5/24  Jorge Shell MD       ROS:  Constitutional: no fever, fatigue, weakness  Eye: no vision loss, eye redness, drainage, or pain  ENMT: no sore throat, ear pain, sinus pain/congestion, nasal congestion/drainage  Respiratory: no cough, no wheezing, no shortness of breath  Cardiovascular: no chest pain, no palpitations, no edema  Gastrointestinal: no nausea, vomiting, or diarrhea. No abdominal pain  Genitourinary: no dysuria, no urinary frequency or urgency, no hematuria  Hema/Lymph: no abnormal bruising or bleeding  Endocrine: no heat or cold intolerance, no excessive thirst or excessive urination  Musculoskeletal: no muscle or joint pain, no joint swelling  Integumentary: no skin rash or abnormal lesion  Neurologic: no headache, no dizziness, no weakness or numbness    OBJECTIVE:     Vital signs:   /74 (BP Location: Left arm, Patient Position: Sitting, BP Method: Medium (Automatic))   Pulse 71   Temp 98.4 °F (36.9 °C) (Oral)   Resp 18   Ht 5' 3" (1.6 m)   Wt 90.1 kg (198 lb 9.6 oz)   SpO2 100%   BMI 35.18 kg/m²      Physical Examination:  Physical Exam  HENT:      Head: Normocephalic and atraumatic.      Mouth/Throat:      Mouth: Mucous membranes are moist.      Pharynx: Oropharynx is clear.   Eyes:      Conjunctiva/sclera: Conjunctivae normal.      Pupils: Pupils are equal, round, and reactive to light.   Cardiovascular:      Rate and Rhythm: Normal rate and regular rhythm.      Pulses: Normal pulses.      Heart sounds: No murmur heard.  Pulmonary: "      Effort: Pulmonary effort is normal. No respiratory distress.      Breath sounds: No wheezing.   Chest:      Chest wall: No tenderness.   Abdominal:      General: Abdomen is flat. Bowel sounds are normal. There is no distension.      Palpations: Abdomen is soft.      Tenderness: There is no abdominal tenderness.   Musculoskeletal:         General: No swelling. Normal range of motion.      Cervical back: Normal range of motion.      Comments: Tenderness to palpation on left side; no tenderness to back area   Skin:     General: Skin is warm.   Neurological:      General: No focal deficit present.      Mental Status: She is alert and oriented to person, place, and time.          Labs:  Lab Results   Component Value Date    WBC 6.20 09/07/2023    HGB 12.7 09/07/2023    HCT 38.6 09/07/2023     09/07/2023    MCV 93.5 09/07/2023    RDW 12.0 09/07/2023    Lab Results   Component Value Date     09/07/2023    K 3.7 09/07/2023    CO2 29 09/07/2023    BUN 10.8 09/07/2023    CREATININE 0.78 09/07/2023    CALCIUM 9.7 09/07/2023    MG 1.90 09/07/2023      Lab Results   Component Value Date    HGBA1C 5.7 04/27/2022    .9 04/27/2022    CREATININE 0.78 09/07/2023    CREATRANDUR 242.1 (H) 10/12/2021    PROTEINURINE 14.4 10/12/2021    Lab Results   Component Value Date    TSH 1.134 07/26/2023    [id number] 0.99 07/26/2023                  ASSESSMENT & PLAN:     B-type symptoms  -Complains of diaphoresis, blurry vision, abdominal pain, and numbness and tingling of left lower extremity  -Suspicion for chemotherapy treatment side effect at this time as patient has been receiving same treatment for 2 years  -Hx of exophytic fibroid mass found on MRI in the past; reports recent US but unable to find in our system  -B12, TSH, and cortisol wnl     Uterine fibroids  -Reports lower abdominal pain going on for months; does not have reported bleeding  -Transvaginal ultrasound ordered and showed lesion in the uterus most  probably related to fibroids; one of them exophytic. Exophytic fibroid appeared to be adjacent to the left ovary.  -MRI pelvis showed appearence of fibroid uterus  -follows with Gynecology, started on over-the-counter vaginal moisturizers     Breast cancer-Stage IIIB (cT4b cN1 M0) Multifocal Infiltrating Ductal Carcinoma of the  Right Breast; s/p reconstructive surgery  -ER positive (93.2%), CO positive (90.3%), HER-2 negative, Ki-67: 15.3% (borderline)  -Lupron monthly (started July 28, 2020) and Arimidex  -S/p right mastectomy and lymph node dissection May 2020; last chemo April 2020, last  radiation August 2020. S/p liposuction and breast augmentation (2021) in [place]  -Patient being seen by Dr. Fraser in [facility] hematology[facility]oncology clinic  -Currently on Arimidex, Calcium and Vitamin D  -We will continue to follow along     Hypertension  -Well controlled; 126/74 today  -Continue HCTZ 12.5 mg-lisinopril 10 mg daily   -Continue to monitor, refill medications at this time    Health Maintenance   Topic Date Due    Colorectal Cancer Screening  04/11/2026    TETANUS VACCINE  12/07/2026    Lipid Panel  04/27/2027    Hepatitis C Screening  Completed        Health Maintenance/ Wellness  Pneumococcal vaccine: #13 (3/5/2020)  TDAP: Up-to-date 2016  Influenza vaccine: UTD 11/22  Shingrix vaccine: Not applicable; performed at age 50  Screening colonoscopy: Cologuard negative  Pap smear: UTD 2021  Mammogram: S/P MASTECTOMY      Labs ordered: N/A  Imaging: N/A  Medications: reconciled, discussed and refills given.  RTC in 4 months      Jorge Shell MD  [facility] Internal Medicine, -2

## 2023-10-05 ENCOUNTER — INFUSION (OUTPATIENT)
Dept: INFUSION THERAPY | Facility: HOSPITAL | Age: 48
End: 2023-10-05
Attending: INTERNAL MEDICINE
Payer: MEDICAID

## 2023-10-05 VITALS
WEIGHT: 198.44 LBS | DIASTOLIC BLOOD PRESSURE: 97 MMHG | HEART RATE: 90 BPM | OXYGEN SATURATION: 99 % | BODY MASS INDEX: 35.15 KG/M2 | TEMPERATURE: 99 F | SYSTOLIC BLOOD PRESSURE: 127 MMHG

## 2023-10-05 DIAGNOSIS — C50.919 MALIGNANT NEOPLASM OF FEMALE BREAST, UNSPECIFIED ESTROGEN RECEPTOR STATUS, UNSPECIFIED LATERALITY, UNSPECIFIED SITE OF BREAST: Primary | ICD-10-CM

## 2023-10-05 PROCEDURE — 63600175 PHARM REV CODE 636 W HCPCS: Mod: JZ,JG | Performed by: NURSE PRACTITIONER

## 2023-10-05 PROCEDURE — 96402 CHEMO HORMON ANTINEOPL SQ/IM: CPT

## 2023-10-05 RX ADMIN — LEUPROLIDE ACETATE 3.75 MG: KIT at 02:10

## 2023-11-02 ENCOUNTER — INFUSION (OUTPATIENT)
Dept: INFUSION THERAPY | Facility: HOSPITAL | Age: 48
End: 2023-11-02
Attending: INTERNAL MEDICINE

## 2023-11-02 NOTE — NURSING
Patient in waiting room arrived. Patient stated she was just informed that her insurance is not renewed and that she was responsible to pay cash for the Lupron. Patient stated she does not want Lupron today. She stated she will call her insurance company and straighten it up then call us to reschedule the appointment. Relayed message to Bella Jerry MA.

## 2023-11-22 ENCOUNTER — TELEPHONE (OUTPATIENT)
Dept: HEMATOLOGY/ONCOLOGY | Facility: CLINIC | Age: 48
End: 2023-11-22

## 2023-11-22 NOTE — NURSING
LIBERTY Lord attempted phone contact to patient, unsuccessful. Unable to leave a voice message. Attempted phone contact to emergency contact, unsuccessful. Left voice message as very important for patient to contact the clinic.

## 2023-11-22 NOTE — TELEPHONE ENCOUNTER
----- Message from Nickolas Michaels RN sent at 11/22/2023  9:14 AM CST -----  Regarding: Medicaid coverage lapsed  Ms. Keller,  Ms. Ngo's medicaid has lapsed.  Pre-Service & I have tried to contact the patient regarding this.  We have been unsuccessful in reaching her.      Please try reaching out to her & refer to financial services.    Thanks  Fide

## 2023-11-22 NOTE — NURSING
Received call back from patient who confirmed she is aware that her Medicaid coverage has termed. Instructed patient to contact [facility] Financial Assistance office. Proved directes and number. Patient stated she would contact them today.

## 2023-11-30 ENCOUNTER — DOCUMENTATION ONLY (OUTPATIENT)
Dept: INFUSION THERAPY | Facility: HOSPITAL | Age: 48
End: 2023-11-30

## 2024-01-26 DIAGNOSIS — C50.911 INFILTRATING DUCTAL CARCINOMA OF BREAST, RIGHT: Primary | ICD-10-CM

## 2024-01-26 DIAGNOSIS — R30.0 DYSURIA: ICD-10-CM

## 2024-01-29 ENCOUNTER — INFUSION (OUTPATIENT)
Dept: INFUSION THERAPY | Facility: HOSPITAL | Age: 49
End: 2024-01-29
Attending: INTERNAL MEDICINE

## 2024-01-29 ENCOUNTER — OFFICE VISIT (OUTPATIENT)
Dept: HEMATOLOGY/ONCOLOGY | Facility: CLINIC | Age: 49
End: 2024-01-29

## 2024-01-29 ENCOUNTER — APPOINTMENT (OUTPATIENT)
Dept: HEMATOLOGY/ONCOLOGY | Facility: CLINIC | Age: 49
End: 2024-01-29

## 2024-01-29 VITALS
WEIGHT: 195.38 LBS | DIASTOLIC BLOOD PRESSURE: 97 MMHG | OXYGEN SATURATION: 98 % | SYSTOLIC BLOOD PRESSURE: 137 MMHG | HEIGHT: 66 IN | HEART RATE: 96 BPM | TEMPERATURE: 98 F | RESPIRATION RATE: 18 BRPM | BODY MASS INDEX: 31.4 KG/M2

## 2024-01-29 DIAGNOSIS — C50.911 INFILTRATING DUCTAL CARCINOMA OF BREAST, RIGHT: ICD-10-CM

## 2024-01-29 DIAGNOSIS — C50.919 MALIGNANT NEOPLASM OF FEMALE BREAST, UNSPECIFIED ESTROGEN RECEPTOR STATUS, UNSPECIFIED LATERALITY, UNSPECIFIED SITE OF BREAST: Primary | ICD-10-CM

## 2024-01-29 DIAGNOSIS — R30.0 DYSURIA: ICD-10-CM

## 2024-01-29 DIAGNOSIS — G62.9 NEUROPATHY: ICD-10-CM

## 2024-01-29 DIAGNOSIS — C50.911 INFILTRATING DUCTAL CARCINOMA OF BREAST, RIGHT: Primary | ICD-10-CM

## 2024-01-29 DIAGNOSIS — Z79.811 LONG TERM CURRENT USE OF AROMATASE INHIBITOR: ICD-10-CM

## 2024-01-29 LAB
ALBUMIN SERPL-MCNC: 3.7 G/DL (ref 3.5–5)
ALBUMIN/GLOB SERPL: 0.8 RATIO (ref 1.1–2)
ALP SERPL-CCNC: 141 UNIT/L (ref 40–150)
ALT SERPL-CCNC: 20 UNIT/L (ref 0–55)
AST SERPL-CCNC: 21 UNIT/L (ref 5–34)
BASOPHILS # BLD AUTO: 0.03 X10(3)/MCL
BASOPHILS NFR BLD AUTO: 0.5 %
BILIRUB SERPL-MCNC: 0.4 MG/DL
BUN SERPL-MCNC: 12.5 MG/DL (ref 7–18.7)
CALCIUM SERPL-MCNC: 9.2 MG/DL (ref 8.4–10.2)
CHLORIDE SERPL-SCNC: 110 MMOL/L (ref 98–107)
CO2 SERPL-SCNC: 24 MMOL/L (ref 22–29)
CREAT SERPL-MCNC: 0.78 MG/DL (ref 0.55–1.02)
EOSINOPHIL # BLD AUTO: 0.14 X10(3)/MCL (ref 0–0.9)
EOSINOPHIL NFR BLD AUTO: 2.3 %
ERYTHROCYTE [DISTWIDTH] IN BLOOD BY AUTOMATED COUNT: 12 % (ref 11.5–17)
GFR SERPLBLD CREATININE-BSD FMLA CKD-EPI: >60 MLS/MIN/1.73/M2
GLOBULIN SER-MCNC: 4.4 GM/DL (ref 2.4–3.5)
GLUCOSE SERPL-MCNC: 142 MG/DL (ref 74–100)
HCT VFR BLD AUTO: 39.7 % (ref 37–47)
HGB BLD-MCNC: 13.3 G/DL (ref 12–16)
IMM GRANULOCYTES # BLD AUTO: 0.04 X10(3)/MCL (ref 0–0.04)
IMM GRANULOCYTES NFR BLD AUTO: 0.7 %
LYMPHOCYTES # BLD AUTO: 2.23 X10(3)/MCL (ref 0.6–4.6)
LYMPHOCYTES NFR BLD AUTO: 36.8 %
MAGNESIUM SERPL-MCNC: 2 MG/DL (ref 1.6–2.6)
MCH RBC QN AUTO: 31.3 PG (ref 27–31)
MCHC RBC AUTO-ENTMCNC: 33.5 G/DL (ref 33–36)
MCV RBC AUTO: 93.4 FL (ref 80–94)
MONOCYTES # BLD AUTO: 0.3 X10(3)/MCL (ref 0.1–1.3)
MONOCYTES NFR BLD AUTO: 5 %
NEUTROPHILS # BLD AUTO: 3.32 X10(3)/MCL (ref 2.1–9.2)
NEUTROPHILS NFR BLD AUTO: 54.7 %
NRBC BLD AUTO-RTO: 0 %
PLATELET # BLD AUTO: 281 X10(3)/MCL (ref 130–400)
PMV BLD AUTO: 9.9 FL (ref 7.4–10.4)
POTASSIUM SERPL-SCNC: 3.7 MMOL/L (ref 3.5–5.1)
PROT SERPL-MCNC: 8.1 GM/DL (ref 6.4–8.3)
RBC # BLD AUTO: 4.25 X10(6)/MCL (ref 4.2–5.4)
SODIUM SERPL-SCNC: 143 MMOL/L (ref 136–145)
WBC # SPEC AUTO: 6.06 X10(3)/MCL (ref 4.5–11.5)

## 2024-01-29 PROCEDURE — 85025 COMPLETE CBC W/AUTO DIFF WBC: CPT

## 2024-01-29 PROCEDURE — 96402 CHEMO HORMON ANTINEOPL SQ/IM: CPT

## 2024-01-29 PROCEDURE — 80053 COMPREHEN METABOLIC PANEL: CPT

## 2024-01-29 PROCEDURE — 99214 OFFICE O/P EST MOD 30 MIN: CPT | Mod: S$PBB,,, | Performed by: NURSE PRACTITIONER

## 2024-01-29 PROCEDURE — 83735 ASSAY OF MAGNESIUM: CPT

## 2024-01-29 PROCEDURE — 36415 COLL VENOUS BLD VENIPUNCTURE: CPT

## 2024-01-29 PROCEDURE — 99214 OFFICE O/P EST MOD 30 MIN: CPT | Mod: PBBFAC | Performed by: NURSE PRACTITIONER

## 2024-01-29 PROCEDURE — 63600175 PHARM REV CODE 636 W HCPCS: Mod: JZ,JG | Performed by: NURSE PRACTITIONER

## 2024-01-29 RX ORDER — DULOXETIN HYDROCHLORIDE 60 MG/1
60 CAPSULE, DELAYED RELEASE ORAL NIGHTLY
Qty: 30 CAPSULE | Refills: 4 | Status: SHIPPED | OUTPATIENT
Start: 2024-01-29

## 2024-01-29 RX ORDER — GABAPENTIN 400 MG/1
400 CAPSULE ORAL 3 TIMES DAILY
Qty: 120 CAPSULE | Refills: 3 | Status: SHIPPED | OUTPATIENT
Start: 2024-01-29

## 2024-01-29 RX ORDER — ANASTROZOLE 1 MG/1
1 TABLET ORAL DAILY
Qty: 30 TABLET | Refills: 4 | Status: SHIPPED | OUTPATIENT
Start: 2024-01-29 | End: 2024-05-09

## 2024-01-29 RX ADMIN — LEUPROLIDE ACETATE 3.75 MG: KIT at 11:01

## 2024-01-29 NOTE — PROGRESS NOTES
Problem List:  1. Stage IIIB (cT4b cN1 M0) Multifocal Infiltrating Ductal Carcinoma of the Right Breast  -Diagnosed: 10/01/2019  -1.2 cm, DCIS present, Right axillary LN positive  -ER positive (93.2%), CA positive (90.3%), HER-2 negative, Ki-67: 15.3% (borderline)    Current Treatment:  1. Lupron monthly. Started 7/28/2020.    2. Arimidex 1mg po daily to start 6-8 weeks after initiation of Lupron (late September).  Started 9/18/2020.    Treatment History:  1. Neoadjuvant DDAC x4 cycles: 12/13/2019-01/24/2020  2. Neoadjuvant Taxol 80 mg/m² IV weekly x12 cycles. Started 2/7/2020. Completed 12 cycles on 4/23/2020.  3. 05/27/2020: Simple right mastectomy, right axillary lymph node dissection  3. XRT 6/2/2020 to 8/7/2020.    History of Present Illness:  44-year-old female referred by Dr. Stephanie Silva for breast cancer.    Interval History 1/29/2024: Patient presents alone for scheduled follow breast cancer surveillance visit. She reports adherence to Arimidex, Calcium + Vitamin D daily. She is due to receive Lupron injection today.  Patient's last Lupron injection was in October due to financial difficulties.  Patient admits to soreness in left breast.  She is due for mammogram now.  Patient says that she was receiving physical therapy for pelvic atrophy but had to stop due to financial difficulties.  She is up-to-date with bone density scan.  She denies any unintentional weight loss, N/V/D/C, unusual headache, abnormal bleeding of any kind, dyspnea, fever or any signs of infection.  Patient reports neuropathy symptoms managed well with gabapentin and Cymbalta.  Lab work reviewed with the patient stable.  Discussed plan of care and follow-up.    Review of Systems   Neurological:  Positive for tingling.   All other systems reviewed and are negative.      Physical Exam  Constitutional:       Appearance: Normal appearance.   HENT:      Head: Normocephalic.      Mouth/Throat:      Mouth: Mucous membranes are moist.   Eyes:       Pupils: Pupils are equal, round, and reactive to light.   Cardiovascular:      Rate and Rhythm: Normal rate and regular rhythm.      Pulses: Normal pulses.      Heart sounds: Normal heart sounds.   Pulmonary:      Effort: Pulmonary effort is normal.      Breath sounds: Normal breath sounds.   Chest:   Breasts:     Right: Absent.      Left: Tenderness present.   Abdominal:      General: Bowel sounds are normal.      Palpations: Abdomen is soft.   Musculoskeletal:         General: Normal range of motion.      Cervical back: Normal range of motion.   Skin:     General: Skin is warm and dry.      Capillary Refill: Capillary refill takes less than 2 seconds.   Neurological:      General: No focal deficit present.      Mental Status: She is alert and oriented to person, place, and time.   Psychiatric:         Attention and Perception: Attention normal.         Mood and Affect: Affect normal.         Speech: Speech normal.         Behavior: Behavior normal.         Thought Content: Thought content normal.         Assessment:  1. Breast cancer C50.919 1. Breast cancer of lower-inner quadrant of right female breast C50.311  #Multifocal infiltrating ductal carcinoma of right breast, status post right axillary lymph node biopsy (positive: 10/01/2019)  At least 5 breast masses, multiple abnormal lymph nodes in axilla (mammogram and ultrasound 09/24/2019)  IDC, intermediate grade, at least 1.2 cm, along with DCIS; right axillary lymph node positive (biopsy 10/01/2019)  ER positive (93.2%). MO positive (90.3%). HER-2 negative (IHC, FISH testing). 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 GA positive. HER-2 negative.  -Neoadjuvant DDAC x4 (12/13/2019-01/24/2020), clinically, with good response  -Neoadjuvant weekly Taxol x12 (02/07/2020-04/23/2020)   -05/27/2020: Simple right mastectomy, right axillary lymph node dissection:  Pathology: Total right mastectomy; invasive carcinoma; multiple foci of invasive carcinoma; largest invasive carcinoma, at least 2.0 cm; overall grade 1; DCIS present, positive for extensive intraductal component; size of DCIS, 5 cm, cribriform, clinging, micropapillary, nuclear grade 3, central necrosis; invasive carcinoma margins uninvolved, 3.4 cm; DCIS margins uninvolved, 3.4 cm; number of lymph nodes examined, 20; number of sentinel lymph nodes examined, 3; number of lymph nodes with macro metastasis, 3; number of lymph nodes with micrometastasis, 2; number of lymph nodes with isolated tumor cells, 0; no extranodal extension; no definite response to presurgical therapy in the invasive carcinoma; no definite response to presurgical therapy in the metastatic carcinoma; lymphovascular invasion present  --> pT2 pN2a (2.0 cm tumor; 5 lymph nodes positive) (no definitive response to neoadjuvant chemotherapy)  2. Right axillary SLN #1, excisional biopsy: Negative for carcinoma  3 Right axillary SLN #2, excisional biopsy: 1 lymph node, positive for micrometastasis  4. Right axillary SLN #3, excisional biopsy: 1 lymph node, positive for 1 micrometastasis  5. Right axillary lymph node, dissection permanent: 1/15 lymph nodes identified  ER positive (60%). GA positive (99%). HER-2 not overexpressed (score 0). Ki-67 low proliferation (2%)  -No metastasis (CT C/A/P, bone scan: 07/01/2020; brain MRI: 07/07/2020)  -Normal BMD (DEXA: 07/01/2020)  -11/24/2021:  Bilateral diagnostic contrast-enhanced MMG with tiffany: Right Breast: Benign (BI-RADS 2) Left Breast: Benign (BI-RADS 2)   -----    # Premenopausal as of 11/11/2019 by history  -No menstrual cycles since December 2019 (after starting neoadjuvant chemotherapy)  ----    # Family history of breast cancer and uterine cancers  -12/02/2019: Genetic testing: Three (3) variants of uncertain significance (VUS): AXIN2 c.1573C>G (p.Gkq398Ymg), msh3 c.2005C>T (p.Spo108Waa), and POLE c.1007A>G (p.Pqp413Wuv)  ----    #Exophytic fibroid versus left adnexal mass (4 cm, solid) on CT 11/22/2019 12/03/2019: Pelvic ultrasound: No discrete sonographic pathology    Plan:   Breast cancer  -Since she has aggressive, node positive disease, therefore, for adjuvant endocrine therapy, she will benefit from ovarian suppression plus aromatase inhibitor therapy.    Recommendation: Counseling for fertility concerns if premenopausal; pregnancy test in all women of childbearing potential.  She is already status post tubal ligation.    -Significant residual disease (2 cm invasive carcinoma, 5 lymph nodes positive) post neoadjuvant chemotherapy, with no definitive response to neoadjuvant chemotherapy  -Metastasis have been ruled out  -Needs adjuvant radiation therapy completed  -Was premenopausal as of 11/11/2019, before starting neoadjuvant chemotherapy  -Lupron shots monthly    -No evidence of disease recurrence. Continue surveillance.  -Continue Lupron monthly; due today  -Follow up w/np in 6 months (7/15/24) with lab work (cbc,cmp,mg) prior to Lupron injection  -Annual screening MMG contrast enhanced bilateral due 1/2024;-Ordered today    Breast cancer surveillance:  -History and physical exam 1-4 times per year for 5 years, then annually;     -Mammogram every 12 months, with no clear advantage to shorter interval imaging; patient should wait 6-12 months after the completion of radiation therapy to begin their annual mammogram surveillance;  suspicious findings on physical examination or surveillance imaging might warrant a shorter interval between mammograms;     -Educate, monitor, and referred for lymphedema management    -Women on an aromatase inhibitor or who experience ovarian failure secondary to treatment, should have monitoring of bone health with a bone mineral density determination at baseline and periodically thereafter (the use of a bisphosphonate, oral or IV, or denosumab is acceptable to maintain or to improve bone mineral density and reduce risk of fractures in postmenopausal (natural or induced) patients receiving adjuvant endocrine therapy. Optimal duration of therapy has not been established. Duration beyond 3 years is not known. Women treated with a bisphosphonate or denosumab should take supplemental calcium and vitamin D.    -Routine imaging of reconstructed breast is not indicated;     -In the absence of clinical signs or symptoms suggestive of recurrent disease, there is no indication for laboratory or imaging studies for metastasis screening;     -Active lifestyle, healthy diet, limited alcohol intake, and achieving and maintaining an ideal body weight (20-25 BMI) may lead to optimal breast cancer outcomes.     2. Long term (current) use of aromatase inhibitors Z79.811 Needs adjuvant endocrine therapy  For adjuvant endocrine therapy, will treat her with LHRH agonist plus aromatase inhibitor (Arimidex) for 5 years  Arimidex to start 6-8 weeks after initiation of monthly LHRH agonist shots  Baseline DEXA scan (03/21/2020, with normal BMD   Normal BMD on baseline DEXA scan (07/01/2020)  7/7/2022 DEXA normal but showed decreased density  Advised to start taking calcium and vitamin D supplements (calcium 1200 mg daily and vitamin D3 800-1000 units daily)     Continue Arimidex daily.refills sent to pharmacy  Continue calcium + vitamin D supplementation daily.  Repeat DEXA scan in 1 years (7/2024)    3. 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 nightly started 03/18/2020     -Continue gabapentin and Cymbalta for Taxol-induced peripheral neuropathy.     Pt presents to NP visit with complaints of fatigue, numbness in feet, frequent urintation

## 2024-01-31 ENCOUNTER — OFFICE VISIT (OUTPATIENT)
Dept: INTERNAL MEDICINE | Facility: CLINIC | Age: 49
End: 2024-01-31

## 2024-01-31 VITALS
DIASTOLIC BLOOD PRESSURE: 84 MMHG | RESPIRATION RATE: 18 BRPM | TEMPERATURE: 98 F | SYSTOLIC BLOOD PRESSURE: 120 MMHG | OXYGEN SATURATION: 99 % | HEART RATE: 79 BPM | BODY MASS INDEX: 31.69 KG/M2 | HEIGHT: 66 IN | WEIGHT: 197.19 LBS

## 2024-01-31 DIAGNOSIS — C50.911 INFILTRATING DUCTAL CARCINOMA OF BREAST, RIGHT: ICD-10-CM

## 2024-01-31 DIAGNOSIS — D21.9 FIBROIDS: ICD-10-CM

## 2024-01-31 DIAGNOSIS — G62.9 NEUROPATHY: ICD-10-CM

## 2024-01-31 PROCEDURE — 99213 OFFICE O/P EST LOW 20 MIN: CPT | Mod: PBBFAC

## 2024-01-31 RX ORDER — PANTOPRAZOLE SODIUM 40 MG/1
40 TABLET, DELAYED RELEASE ORAL DAILY
Qty: 30 TABLET | Refills: 3 | Status: SHIPPED | OUTPATIENT
Start: 2024-01-31 | End: 2025-01-30

## 2024-01-31 RX ORDER — CYCLOBENZAPRINE HCL 10 MG
10 TABLET ORAL 3 TIMES DAILY PRN
Qty: 30 TABLET | Refills: 2 | Status: SHIPPED | OUTPATIENT
Start: 2024-01-31 | End: 2024-03-01

## 2024-01-31 RX ORDER — CHOLECALCIFEROL (VITAMIN D3) 25 MCG
1000 TABLET ORAL DAILY
Qty: 60 TABLET | Refills: 3 | Status: SHIPPED | OUTPATIENT
Start: 2024-01-31

## 2024-02-01 NOTE — PROGRESS NOTES
[facility] INTERNAL MEDICINE  OUTPATIENT OFFICE VISIT NOTE       Chief Complaint: Follow-up (Patient states no acute complaints today.)       HPI: Jeni Mejia is a 48 y.o. yo female with  has a past medical history of Breast cancer and HTN (hypertension)., who presents for  follow-up appointment .  Patient presents today complaining of similar B type symptoms compared to her last visit including nausea, bloating, numbness, tingling, and trouble sleeping.  Lab work drawn during her last visit showed normal B12, TSH, and cortisol levels.  Patient does note again receiving Lupron chemotherapy recently and reports that she normally has these symptoms after receiving treatment.  Suspect the symptoms are primarily due to chemotherapy for her breast cancer but we will continue to monitor at this time.  Patient does complain of new pain in her right arm and feels like there is a tight knot there.  We will plan to start patient on Flexeril short term to see if there is any improvement due to concerns for possible muscle strain.  Patient also complaining of some abdominal pain associated with heartburn type symptoms.  We will plan to trial patient on Protonix and continue to monitor for improvement.      Past Medical History:   has a past medical history of Breast cancer and HTN (hypertension).     Past Surgical History:   has a past surgical history that includes Mastectomy (Right); Augmentation of breast (Left); Breast surgery (Right); and Tubal ligation.     Family History:  family history includes Hypertension in her mother; No Known Problems in her father.     Social History:   reports that she has never smoked. She has never used smokeless tobacco. She reports that she does not currently use alcohol. She reports that she does not use drugs.     Allergies:  is allergic to sulfa (sulfonamide antibiotics) and sulfamethoxazole-trimethoprim.     Home Medications:  Prior to Admission medications    Medication Sig Start Date  End Date Taking? Authorizing Provider   anastrozole (ARIMIDEX) 1 mg Tab Take 1 tablet (1 mg total) by mouth once daily. 9/7/23  Yes Kasia Jerry FNP   calcium 26-vit D3-magnesium 15 167 mg calcium- 1.67 mcg-83 mg Cap   Calcium 1200mg plus Vitamin D3 25mcg, See Instructions, take one daily, 0 Refill(s) 2/7/22  Yes Provider, Historical   calcium carbonate (OS-MARK) 600 mg calcium (1,500 mg) Tab Take 600 mg by mouth.   Yes Provider, Historical   meloxicam (MOBIC) 7.5 MG tablet Take 1 tablet (7.5 mg total) by mouth once daily. Take 1 tablet as needed for pain daily 3/30/23  Yes Jorge Shell MD   ondansetron (ZOFRAN) 4 MG tablet TAKE 1 TABLET BY MOUTH EVERY 8 HOURS AS NEEDED FOR NAUSEA 3/30/23  Yes Jorge Shell MD   tamoxifen (NOLVADEX) 10 MG Tab tamoxifen Take No date recorded No form recorded No frequency recorded No route recorded No set duration recorded No set duration amount recorded active No dosage strength recorded No dosage strength units of measure recorded   Yes Provider, Historical   vitamin D (VITAMIN D3) 1000 units Tab Take 1,000 Units by mouth once daily. 7/1/22  Yes Provider, Historical   docusate sodium (COLACE) 100 MG capsule Take 1 capsule (100 mg total) by mouth 2 (two) times daily as needed for Constipation. 4/18/23 9/11/23 Yes Jorge Shell MD   DULoxetine (CYMBALTA) 60 MG capsule Take 1 capsule (60 mg total) by mouth every evening. 8/18/22 9/11/23 Yes Kasia Jerry FNP   gabapentin (NEURONTIN) 400 MG capsule Take 400 mg by mouth. 2/7/22 9/11/23 Yes Provider, Historical   lisinopriL-hydrochlorothiazide (PRINZIDE,ZESTORETIC) 10-12.5 mg per tablet Take by mouth. 2/7/22 9/11/23 Yes Provider, Historical   docusate sodium (COLACE) 100 MG capsule Take 1 capsule (100 mg total) by mouth 2 (two) times daily as needed for Constipation. 9/11/23   Jorge Shell MD   DULoxetine (CYMBALTA) 60 MG capsule Take 1 capsule (60 mg total) by mouth every evening. 9/11/23    "Jorge Shell MD   gabapentin (NEURONTIN) 400 MG capsule Take 1 capsule (400 mg total) by mouth 3 (three) times daily. 9/11/23   Jorge Shell MD   hydrOXYzine HCL (ATARAX) 10 MG Tab Take 10 mg by mouth. 8/10/23   Provider, Historical   lisinopriL-hydrochlorothiazide (PRINZIDE,ZESTORETIC) 10-12.5 mg per tablet Take 1 tablet by mouth once daily. 9/11/23 9/5/24  Jorge Shell MD       ROS:  Constitutional: no fever, fatigue, weakness  Eye: no vision loss, eye redness, drainage, or pain  ENMT: no sore throat, ear pain, sinus pain/congestion, nasal congestion/drainage  Respiratory: no cough, no wheezing, no shortness of breath  Cardiovascular: no chest pain, no palpitations, no edema  Gastrointestinal: no nausea, vomiting, or diarrhea. No abdominal pain  Genitourinary: no dysuria, no urinary frequency or urgency, no hematuria  Hema/Lymph: no abnormal bruising or bleeding  Endocrine: no heat or cold intolerance, no excessive thirst or excessive urination  Musculoskeletal: no muscle or joint pain, no joint swelling  Integumentary: no skin rash or abnormal lesion  Neurologic: no headache, no dizziness, no weakness or numbness    OBJECTIVE:     Vital signs:   /84 (BP Location: Left arm, Patient Position: Sitting, BP Method: Medium (Automatic))   Pulse 79   Temp 98.1 °F (36.7 °C) (Oral)   Resp 18   Ht 5' 6" (1.676 m)   Wt 89.4 kg (197 lb 3.2 oz)   SpO2 99%   BMI 31.83 kg/m²      Physical Examination:  Physical Exam  HENT:      Head: Normocephalic and atraumatic.      Mouth/Throat:      Mouth: Mucous membranes are moist.      Pharynx: Oropharynx is clear.   Eyes:      Conjunctiva/sclera: Conjunctivae normal.      Pupils: Pupils are equal, round, and reactive to light.   Cardiovascular:      Rate and Rhythm: Normal rate and regular rhythm.      Pulses: Normal pulses.      Heart sounds: No murmur heard.  Pulmonary:      Effort: Pulmonary effort is normal. No respiratory distress.      " Breath sounds: No wheezing.   Chest:      Chest wall: No tenderness.   Abdominal:      General: Abdomen is flat. Bowel sounds are normal. There is no distension.      Palpations: Abdomen is soft.      Tenderness: There is no abdominal tenderness.   Musculoskeletal:         General: No swelling. Normal range of motion.      Cervical back: Normal range of motion.   Skin:     General: Skin is warm.   Neurological:      General: No focal deficit present.      Mental Status: She is alert and oriented to person, place, and time.          Labs:  Lab Results   Component Value Date    WBC 6.06 01/29/2024    HGB 13.3 01/29/2024    HCT 39.7 01/29/2024     01/29/2024    MCV 93.4 01/29/2024    RDW 12.0 01/29/2024    Lab Results   Component Value Date     01/29/2024    K 3.7 01/29/2024    CO2 24 01/29/2024    BUN 12.5 01/29/2024    CREATININE 0.78 01/29/2024    CALCIUM 9.2 01/29/2024    MG 2.00 01/29/2024      Lab Results   Component Value Date    HGBA1C 5.7 04/27/2022    .9 04/27/2022    CREATININE 0.78 01/29/2024    CREATRANDUR 242.1 (H) 10/12/2021    PROTEINURINE 14.4 10/12/2021    Lab Results   Component Value Date    TSH 1.134 07/26/2023    [id number] 0.99 07/26/2023                  ASSESSMENT & PLAN:     B-type symptoms  -Complains of diaphoresis, blurry vision, abdominal pain, and numbness and tingling of left lower extremity  -Suspicion for chemotherapy treatment side effect at this time as patient has been receiving same treatment for 2 years  -Hx of exophytic fibroid mass found on MRI in the past; reports recent US but unable to find in our system  -B12, TSH, and cortisol wnl  -Suspect symptoms due to Lupron treatments; will continue to monitor at this time     Uterine fibroids  -Reports lower abdominal pain going on for months; does not have reported bleeding  -Transvaginal ultrasound ordered and showed lesion in the uterus most probably related to fibroids; one of them exophytic. Exophytic  fibroid appeared to be adjacent to the left ovary.  -MRI pelvis showed appearence of fibroid uterus  -follows with Gynecology, started on over-the-counter vaginal moisturizers     Breast cancer-Stage IIIB (cT4b cN1 M0) Multifocal Infiltrating Ductal Carcinoma of the  Right Breast; s/p reconstructive surgery  -ER positive (93.2%), ME positive (90.3%), HER-2 negative, Ki-67: 15.3% (borderline)  -Lupron monthly (started July 28, 2020) and Arimidex  -S/p right mastectomy and lymph node dissection May 2020; last chemo April 2020, last  radiation August 2020. S/p liposuction and breast augmentation (2021) in [place]  -Patient being seen by Dr. Fraser in [facility] hematology/oncology clinic  -Currently on Arimidex, Calcium and Vitamin D  -We will continue to follow along     Hypertension  -Well controlled; 120/84 today  -Continue HCTZ 12.5 mg-lisinopril 10 mg daily   -Continue to monitor, refill medications at this time    Health Maintenance   Topic Date Due    Colorectal Cancer Screening  04/11/2026    TETANUS VACCINE  12/07/2026    Lipid Panel  04/27/2027    Hepatitis C Screening  Completed        Health Maintenance/ Wellness  Pneumococcal vaccine: #13 (3/5/2020)  TDAP: Up-to-date 2016  Influenza vaccine: UTD 11/22  Shingrix vaccine: Not applicable; performed at age 50  Screening colonoscopy: Cologuard negative  Pap smear: UTD 2021  Mammogram: S/P MASTECTOMY      Labs ordered: N/A  Imaging: N/A  Medications: reconciled, discussed and refills given.  RTC in 3 months      Jorge Shell MD  [facility], -2

## 2024-02-16 ENCOUNTER — OFFICE VISIT (OUTPATIENT)
Dept: URGENT CARE | Facility: CLINIC | Age: 49
End: 2024-02-16

## 2024-02-16 VITALS
TEMPERATURE: 101 F | SYSTOLIC BLOOD PRESSURE: 124 MMHG | HEART RATE: 112 BPM | HEIGHT: 66 IN | OXYGEN SATURATION: 98 % | RESPIRATION RATE: 20 BRPM | BODY MASS INDEX: 30.7 KG/M2 | WEIGHT: 191 LBS | DIASTOLIC BLOOD PRESSURE: 88 MMHG

## 2024-02-16 DIAGNOSIS — R07.0 THROAT PAIN IN ADULT: ICD-10-CM

## 2024-02-16 DIAGNOSIS — J02.0 ACUTE STREPTOCOCCAL PHARYNGITIS: Primary | ICD-10-CM

## 2024-02-16 DIAGNOSIS — J06.9 UPPER RESPIRATORY TRACT INFECTION, UNSPECIFIED TYPE: ICD-10-CM

## 2024-02-16 LAB
CTP QC/QA: YES
CTP QC/QA: YES
MOLECULAR STREP A: POSITIVE
POC MOLECULAR INFLUENZA A AGN: NEGATIVE
POC MOLECULAR INFLUENZA B AGN: NEGATIVE

## 2024-02-16 PROCEDURE — 87502 INFLUENZA DNA AMP PROBE: CPT | Mod: PBBFAC

## 2024-02-16 PROCEDURE — 87651 STREP A DNA AMP PROBE: CPT | Mod: PBBFAC

## 2024-02-16 PROCEDURE — 99215 OFFICE O/P EST HI 40 MIN: CPT | Mod: PBBFAC

## 2024-02-16 PROCEDURE — 99214 OFFICE O/P EST MOD 30 MIN: CPT | Mod: S$PBB,,,

## 2024-02-16 PROCEDURE — 63600175 PHARM REV CODE 636 W HCPCS

## 2024-02-16 RX ORDER — AMOXICILLIN 500 MG/1
500 TABLET, FILM COATED ORAL EVERY 12 HOURS
Qty: 20 TABLET | Refills: 0 | Status: SHIPPED | OUTPATIENT
Start: 2024-02-16 | End: 2024-02-26

## 2024-02-16 RX ORDER — KETOROLAC TROMETHAMINE 30 MG/ML
30 INJECTION, SOLUTION INTRAMUSCULAR; INTRAVENOUS
Status: COMPLETED | OUTPATIENT
Start: 2024-02-16 | End: 2024-02-16

## 2024-02-16 RX ADMIN — KETOROLAC TROMETHAMINE 30 MG: 30 INJECTION, SOLUTION INTRAMUSCULAR; INTRAVENOUS at 03:02

## 2024-02-16 NOTE — NURSING
Ketorolac 30 mg administered to Right Gluteal using aseptic technique. Patient observed for 15 minutes for reactions, none noted. Patient tolerated well.

## 2024-02-16 NOTE — LETTER
February 16, 2024      [facility]  [facility] [facility] [address]  Phone: [phone]       Patient: Jeni Mejia   YOB: 1975  Date of Visit: 02/16/2024    To Whom It May Concern:    Brionna Mejia  was at [facility] on 02/16/2024. The patient may return to work/school on 02/19/2024 with no restrictions. If you have any questions or concerns, or if I can be of further assistance, please do not hesitate to contact me.    Sincerely,      ABHIJEET Ortiz

## 2024-02-16 NOTE — PROGRESS NOTES
"Subjective:       Patient ID: Jeni Mejia is a 48 y.o. female.    Vitals:  height is 5' 6" (1.676 m) and weight is 86.6 kg (191 lb). Her oral temperature is 100.6 °F (38.1 °C) (abnormal). Her blood pressure is 129/90 (abnormal) and her pulse is 112 (abnormal). Her respiration is 20 and oxygen saturation is 98%.     Chief Complaint: URI (Cough, headache, body aches, fever and sore throat.)    48-year-old  female with history of active breaths cancer presents to clinic alone.  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  States COVID testing work was negative.  Complaining of symptoms her 1 day, states did not take any Tylenol or Motrin prior to arrival.      URI   Associated symptoms include coughing, headaches and a sore throat. Pertinent negatives include no abdominal pain, diarrhea, ear pain, nausea or vomiting.       Constitution: Positive for fatigue and fever.   HENT:  Positive for sore throat. Negative for ear pain, trouble swallowing and voice change.    Respiratory:  Positive for cough. Negative for shortness of breath.    Gastrointestinal:  Negative for abdominal pain, nausea, vomiting and diarrhea.   Musculoskeletal:  Positive for muscle ache.   Neurological:  Positive for headaches.       Objective:      Physical Exam   Constitutional: She is oriented to person, place, and time. She is cooperative. She is easily aroused. She does not appear ill. awake  HENT:   Head: Normocephalic and atraumatic.   Ears:   Right Ear: Tympanic membrane normal.   Left Ear: Tympanic membrane normal.   Nose: Nose normal.   Mouth/Throat: Uvula is midline and mucous membranes are normal.   Minimal visualization of oropharynx      Comments: Minimal visualization of oropharynx  Eyes: Conjunctivae and lids are normal.   Neck: Neck supple.   Cardiovascular: Tachycardia present.   Pulmonary/Chest: Effort normal and breath sounds normal.   Abdominal: Normal appearance. "   Lymphadenopathy:     She has no cervical adenopathy.   Neurological: She is alert, oriented to person, place, and time and easily aroused. GCS eye subscore is 4. GCS verbal subscore is 5. GCS motor subscore is 6.   Skin: Skin is warm, dry and intact. Capillary refill takes less than 2 seconds.   Psychiatric: Her behavior is normal.   Nursing note and vitals reviewed.        Assessment:       1. Acute streptococcal pharyngitis    2. Upper respiratory tract infection, unspecified type    3. Throat pain in adult          Plan:     Strep is positive today, we will treat with amoxicillin for 10 days.  Encouraged patient to change toothbrush 48 hours after treatment.  May take Tylenol/Motrin as needed for fever and pain, increase oral fluid intake.  COVID test resulted in valid, offered patient PCR testing, patient declined at this time.    Acute streptococcal pharyngitis    Upper respiratory tract infection, unspecified type  -     POCT COVID-19 Rapid Screening  -     POCT Influenza A/B MOLECULAR  -     POCT Strep A, Molecular    Throat pain in adult  -     ketorolac injection 30 mg           Results for orders placed or performed in visit on 02/16/24   POCT Influenza A/B MOLECULAR   Result Value Ref Range    POC Molecular Influenza A Ag Negative Negative, Not Reported    POC Molecular Influenza B Ag Negative Negative, Not Reported     Acceptable Yes    POCT Strep A, Molecular   Result Value Ref Range    Molecular Strep A, POC Positive (A) Negative     Acceptable Yes

## 2024-02-26 ENCOUNTER — INFUSION (OUTPATIENT)
Dept: INFUSION THERAPY | Facility: HOSPITAL | Age: 49
End: 2024-02-26
Attending: INTERNAL MEDICINE

## 2024-02-26 VITALS
OXYGEN SATURATION: 99 % | DIASTOLIC BLOOD PRESSURE: 80 MMHG | HEART RATE: 71 BPM | TEMPERATURE: 99 F | HEIGHT: 66 IN | SYSTOLIC BLOOD PRESSURE: 116 MMHG | RESPIRATION RATE: 20 BRPM | WEIGHT: 194.25 LBS | BODY MASS INDEX: 31.22 KG/M2

## 2024-02-26 DIAGNOSIS — C50.919 MALIGNANT NEOPLASM OF FEMALE BREAST, UNSPECIFIED ESTROGEN RECEPTOR STATUS, UNSPECIFIED LATERALITY, UNSPECIFIED SITE OF BREAST: Primary | ICD-10-CM

## 2024-02-26 PROCEDURE — 96402 CHEMO HORMON ANTINEOPL SQ/IM: CPT

## 2024-02-26 PROCEDURE — 63600175 PHARM REV CODE 636 W HCPCS: Mod: JZ,JG | Performed by: NURSE PRACTITIONER

## 2024-02-26 RX ADMIN — LEUPROLIDE ACETATE 3.75 MG: KIT at 02:02

## 2024-03-13 ENCOUNTER — HOSPITAL ENCOUNTER (OUTPATIENT)
Dept: RADIOLOGY | Facility: HOSPITAL | Age: 49
Discharge: HOME | End: 2024-03-13
Attending: NURSE PRACTITIONER

## 2024-03-13 DIAGNOSIS — R92.8 ABNORMAL MAMMOGRAM: ICD-10-CM

## 2024-03-13 DIAGNOSIS — C50.919 BREAST CANCER: Primary | ICD-10-CM

## 2024-03-13 DIAGNOSIS — C50.911 INFILTRATING DUCTAL CARCINOMA OF BREAST, RIGHT: ICD-10-CM

## 2024-03-13 LAB
ANION GAP SERPL CALC-SCNC: 19 MMOL/L (ref 8–16)
BUN SERPL-MCNC: 12 MG/DL (ref 6–30)
CHLORIDE SERPL-SCNC: 100 MMOL/L (ref 95–110)
CREAT SERPL-MCNC: 0.7 MG/DL (ref 0.5–1.4)
GLUCOSE SERPL-MCNC: 123 MG/DL (ref 70–110)
HCT VFR BLD CALC: 41 %PCV (ref 36–54)
HGB BLD-MCNC: 14 G/DL
POC IONIZED CALCIUM: 1.28 MMOL/L (ref 1.06–1.42)
POC TCO2 (MEASURED): 28 MMOL/L (ref 23–29)
POTASSIUM BLD-SCNC: 3.4 MMOL/L (ref 3.5–5.1)
SAMPLE: ABNORMAL
SODIUM BLD-SCNC: 143 MMOL/L (ref 136–145)

## 2024-03-13 PROCEDURE — 77065 DX MAMMO INCL CAD UNI: CPT | Mod: 26,LT,, | Performed by: RADIOLOGY

## 2024-03-13 PROCEDURE — 77065 DX MAMMO INCL CAD UNI: CPT | Mod: TC,LT

## 2024-03-13 PROCEDURE — 77067 SCR MAMMO BI INCL CAD: CPT | Mod: TC

## 2024-03-13 PROCEDURE — 76642 ULTRASOUND BREAST LIMITED: CPT | Mod: 26,LT,, | Performed by: RADIOLOGY

## 2024-03-13 PROCEDURE — 77061 BREAST TOMOSYNTHESIS UNI: CPT | Mod: TC,LT

## 2024-03-13 PROCEDURE — 76642 ULTRASOUND BREAST LIMITED: CPT | Mod: TC,LT

## 2024-03-13 PROCEDURE — 77061 BREAST TOMOSYNTHESIS UNI: CPT | Mod: 26,LT,, | Performed by: RADIOLOGY

## 2024-03-13 PROCEDURE — 77067 SCR MAMMO BI INCL CAD: CPT | Mod: 26,,, | Performed by: RADIOLOGY

## 2024-03-13 PROCEDURE — 25500020 PHARM REV CODE 255

## 2024-03-13 RX ADMIN — IOHEXOL 100 ML: 350 INJECTION, SOLUTION INTRAVENOUS at 01:03

## 2024-03-13 RX ADMIN — IOHEXOL 50 ML: 350 INJECTION, SOLUTION INTRAVENOUS at 01:03

## 2024-03-22 ENCOUNTER — HOSPITAL ENCOUNTER (OUTPATIENT)
Dept: RADIOLOGY | Facility: HOSPITAL | Age: 49
Discharge: HOME | End: 2024-03-22
Attending: NURSE PRACTITIONER

## 2024-03-22 DIAGNOSIS — R92.8 ABNORMAL MAMMOGRAM: ICD-10-CM

## 2024-03-22 DIAGNOSIS — N63.21 MASS OF UPPER OUTER QUADRANT OF LEFT BREAST: Primary | ICD-10-CM

## 2024-03-22 PROCEDURE — 88341 IMHCHEM/IMCYTCHM EA ADD ANTB: CPT | Mod: TC | Performed by: RADIOLOGY

## 2024-03-22 PROCEDURE — 19081 BX BREAST 1ST LESION STRTCTC: CPT | Mod: LT,,, | Performed by: RADIOLOGY

## 2024-03-22 PROCEDURE — 88360 TUMOR IMMUNOHISTOCHEM/MANUAL: CPT | Performed by: PATHOLOGY

## 2024-03-22 PROCEDURE — 27000542

## 2024-03-22 PROCEDURE — A4648 IMPLANTABLE TISSUE MARKER: HCPCS

## 2024-03-22 PROCEDURE — 25500020 PHARM REV CODE 255

## 2024-03-22 RX ORDER — LIDOCAINE HYDROCHLORIDE 20 MG/ML
INJECTION, SOLUTION EPIDURAL; INFILTRATION; INTRACAUDAL; PERINEURAL
Status: DISCONTINUED
Start: 2024-03-22 | End: 2024-03-23 | Stop reason: HOSPADM

## 2024-03-22 RX ORDER — LIDOCAINE HCL/EPINEPHRINE/PF 2%-1:200K
VIAL (ML) INJECTION
Status: DISCONTINUED
Start: 2024-03-22 | End: 2024-03-23 | Stop reason: HOSPADM

## 2024-03-22 RX ADMIN — IOHEXOL 50 ML: 350 INJECTION, SOLUTION INTRAVENOUS at 01:03

## 2024-03-22 RX ADMIN — IOHEXOL 100 ML: 350 INJECTION, SOLUTION INTRAVENOUS at 01:03

## 2024-03-25 ENCOUNTER — INFUSION (OUTPATIENT)
Dept: INFUSION THERAPY | Facility: HOSPITAL | Age: 49
End: 2024-03-25
Attending: INTERNAL MEDICINE

## 2024-03-25 VITALS
RESPIRATION RATE: 20 BRPM | OXYGEN SATURATION: 100 % | HEIGHT: 66 IN | TEMPERATURE: 98 F | HEART RATE: 89 BPM | BODY MASS INDEX: 31.64 KG/M2 | SYSTOLIC BLOOD PRESSURE: 137 MMHG | DIASTOLIC BLOOD PRESSURE: 99 MMHG | WEIGHT: 196.88 LBS

## 2024-03-25 DIAGNOSIS — C50.919 MALIGNANT NEOPLASM OF FEMALE BREAST, UNSPECIFIED ESTROGEN RECEPTOR STATUS, UNSPECIFIED LATERALITY, UNSPECIFIED SITE OF BREAST: Primary | ICD-10-CM

## 2024-03-25 PROCEDURE — 96402 CHEMO HORMON ANTINEOPL SQ/IM: CPT

## 2024-03-25 PROCEDURE — 63600175 PHARM REV CODE 636 W HCPCS: Mod: JZ,JG | Performed by: INTERNAL MEDICINE

## 2024-03-25 RX ADMIN — LEUPROLIDE ACETATE 3.75 MG: KIT at 02:03

## 2024-03-25 NOTE — NURSING
1423  Pt arrived for lupron q 4 week injection which was given in [facility].  Pt rates LOP 3/10 to left breast from bx Friday.  Pt reports feeling tired but she is still working.

## 2024-03-26 ENCOUNTER — HOSPITAL ENCOUNTER (OUTPATIENT)
Dept: RADIOLOGY | Facility: HOSPITAL | Age: 49
Discharge: HOME | End: 2024-03-26
Attending: NURSE PRACTITIONER

## 2024-04-02 LAB
DHEA SERPL-MCNC: NORMAL
ESTRIOL SERPL-MCNC: NORMAL NG/ML
ESTROGEN SERPL-MCNC: NORMAL PG/ML
INSULIN SERPL-ACNC: NORMAL U[IU]/ML
LAB AP CLINICAL INFORMATION: NORMAL
LAB AP GROSS DESCRIPTION: NORMAL
LAB AP REPORT FOOTNOTES: NORMAL
T3RU NFR SERPL: NORMAL %

## 2024-04-05 LAB — BEAKER SEE SCANNED REPORT: NORMAL

## 2024-04-09 DIAGNOSIS — C50.911 INFILTRATING DUCTAL CARCINOMA OF BREAST, RIGHT: Primary | ICD-10-CM

## 2024-04-13 PROBLEM — G62.0 CHEMOTHERAPY-INDUCED NEUROPATHY: Status: ACTIVE | Noted: 2024-04-13

## 2024-04-13 PROBLEM — Z90.11 H/O RIGHT MASTECTOMY: Status: ACTIVE | Noted: 2024-04-13

## 2024-04-13 PROBLEM — N95.9 PREMENOPAUSAL PATIENT: Status: ACTIVE | Noted: 2024-04-13

## 2024-04-13 PROBLEM — C50.911 INVASIVE DUCTAL CARCINOMA OF RIGHT BREAST IN FEMALE: Status: ACTIVE | Noted: 2024-04-13

## 2024-04-13 PROBLEM — Z80.49 FAMILY HISTORY OF UTERINE CANCER: Status: ACTIVE | Noted: 2024-04-13

## 2024-04-13 PROBLEM — D25.9 UTERINE FIBROID: Status: ACTIVE | Noted: 2023-03-30

## 2024-04-13 PROBLEM — Z80.3 FAMILY HISTORY OF BREAST CANCER: Status: ACTIVE | Noted: 2024-04-13

## 2024-04-13 PROBLEM — C50.912 LOBULAR CARCINOMA OF LEFT BREAST: Status: ACTIVE | Noted: 2024-04-13

## 2024-04-13 PROBLEM — T45.1X5A CHEMOTHERAPY-INDUCED NEUROPATHY: Status: ACTIVE | Noted: 2024-04-13

## 2024-04-13 NOTE — PROGRESS NOTES
History:  Past Medical History:   Diagnosis Date    Breast cancer     HTN (hypertension)    Past medical history: Hypertension. Obesity. Tubal ligation in 1999.  -Right breast at 6:00, FNA (05/26/2016): Cyst with apocrine metaplasia    Social history: Single. Lives in [place], [place]. Has 2 children. She is an  at a nursing home. Denies history of tobacco, alcohol, or illicit drug abuse.    Family history: Maternal grandmother experienced breast cancer twice, in her 50s, and then in her 80s. One maternal cousin experienced uterine cancer in her 40s. Another maternal cousin experienced uterine cancer in her 50s. Mother's maternal uncle experienced some kind of cancer in the 70s.    Health maintenance:  ThinPrep cervical Pap smear (07/25/2016: Negative for intraepithelial lesion or malignancy.  Bilateral diagnostic mammogram 05/15/2017, was BI-RADS Category 2, benign.    Menstrual and OB/GYN history: Menarche at age 12. First childbirth at age 18. No history of abortions or miscarriages. Used birth control pills many years back. Currently, as of 11/11/2019, regular and normal menstrual periods.    Past Surgical History:   Procedure Laterality Date    AUGMENTATION OF BREAST Left     BREAST SURGERY Right     tram flap of rt breast    MASTECTOMY Right     TUBAL LIGATION        Social History     Socioeconomic History    Marital status: Single   Tobacco Use    Smoking status: Never    Smokeless tobacco: Never   Substance and Sexual Activity    Alcohol use: Not Currently     Comment: occassionally    Drug use: Never    Sexual activity: Not Currently     Partners: Male     Social Determinants of Health     Financial Resource Strain: Low Risk  (3/30/2023)    Overall Financial Resource Strain (CARDIA)     Difficulty of Paying Living Expenses: Not very hard   Food Insecurity: No Food Insecurity (3/30/2023)    Hunger Vital Sign     Worried About Running Out of Food in the Last Year: Never true     Ran  Out of Food in the Last Year: Never true   Transportation Needs: No Transportation Needs (3/30/2023)    PRAPARE - Transportation     Lack of Transportation (Medical): No     Lack of Transportation (Non-Medical): No   Physical Activity: Inactive (3/30/2023)    Exercise Vital Sign     Days of Exercise per Week: 0 days     Minutes of Exercise per Session: 0 min   Stress: Stress Concern Present (3/30/2023)    [place] [employer] - Occupational Stress Questionnaire     Feeling of Stress : To some extent   Social Connections: Moderately Integrated (3/30/2023)    Social Connection and Isolation Panel [NHANES]     Frequency of Communication with Friends and Family: More than three times a week     Frequency of Social Gatherings with Friends and Family: Twice a week     Attends Mandaeism Services: 1 to 4 times per year     Active Member of Clubs or Organizations: No     Attends Club or Organization Meetings: Never     Marital Status: Living with partner   Housing Stability: Low Risk  (3/30/2023)    Housing Stability Vital Sign     Unable to Pay for Housing in the Last Year: No     Number of Places Lived in the Last Year: 1     Unstable Housing in the Last Year: No      Family History   Problem Relation Name Age of Onset    Hypertension Mother      No Known Problems Father        Reason for Follow-up:  Reason for follow-up:   -metachronous hormone receptor positive invasive lobular carcinoma of left breast, S/P biopsy 03/22/2024   -history of IDC right breast, inflammatory carcinoma, cT4d cN1 M0, AJCC anatomic stage IIIB, clinical prognostic stage IIIB, S/P biopsy 10/01/2019, ER 93.2%, WV 90.3%, HER2 negative, Ki 6715-3%, S/P neoadjuvant therapy, followed by simple right mastectomy and right axillary lymph node dissection 05/27/2020, yp T2 yp N2a, 2.0 cm residual tumor, 3 lymph nodes with macrometastases, 2 lymph nodes with micrometastasis, no definite response to neoadjuvant chemotherapy, then adjuvant  radiotherapy and adjuvant endocrine therapy  -Taxol induced peripheral neuropathy  -premenopausal  -family history of breast cancer and uterine cancer  -uterine fibroids    History of Present Illness:   Infiltrating ductal carcinoma of breast, right      Oncologic/Hematologic History:  Oncology History   Invasive ductal carcinoma of right breast in female   10/1/2019 Cancer Staged    Staging form: Breast, AJCC 8th Edition  - Clinical stage from 10/1/2019: Stage IIIB (cT4d, cN1, cM0, G2, ER+, ID+, HER2-)     5/27/2020 Cancer Staged    Staging form: Breast, AJCC 8th Edition  - Pathologic stage from 5/27/2020: ypT2, pN2a, cM0, G1, ER+, ID+, HER2-     4/13/2024 Initial Diagnosis    Invasive ductal carcinoma of right breast in female     44-year-old female referred by Dr. Stephanie Silva for breast cancer.    Patient with history of inflammatory carcinoma right breast, S/P neoadjuvant chemotherapy, right simple mastectomy, right axillary lymph node dissection, adjuvant radiotherapy, adjuvant endocrine therapy, subsequently developing metachronous invasive lobular carcinoma of left breast.    Please see assessment and plan section for details    1/11/2019:  Presents for initial oncology consultation, accompanied by her mother and daughter. Overall, feels quite well except for some fatigue. Has experienced some right upper extremity numbness, pain, and stinging sensation for last 1 month as a result of this, she is preferring left arm for some activities, like typing, etc. Appetite is good. No unintentional weight loss. No unusual headaches, focal neuro symptoms, chest pain, cough, dyspnea, fevers, chills, abdominal pain, nausea, vomiting, GI bleeding, bone pains, any urinary problems, etc. ECOG 0.    Interval History:  [No matching plan found]   [No matching plan found]     07/09/2020:  -07/01/2020: DEXA scan: Normal BMD  -07/01/2020: CT C/A/P with contrast (rule out metastasis): No metastasis  -07/01/2020: Bone scan (rule  out metastasis): No bone metastasis  -07/07/2020: Brain MRI with and without contrast: No intracranial metastatic disease  Patient interviewed via telephone visit. She was at home in [place], [place]. She says that she is already receiving adjuvant radiation therapy and today, will be her 10th day. Tolerating well. Good appetite. Good energy level. No new weakness or fatigue. No neurological symptoms, headaches, chest pain, dyspnea, cough, fevers, chills, abdominal pain, nausea, vomiting. We discussed results of all imaging studies.    04/15/2024:  -last seen by me on 07/09/2020   -subsequently, being followed by oncology nurse practitioner's  -S/P adjuvant radiotherapy to right mastectomy bed 06/02/2020-08/07/2020  -for adjuvant endocrine therapy, started on monthly Lupron shots 07/28/2020  -for adjuvant endocrine therapy, Arimidex started 09/20/2020  -03/24/2021:  Right BANG reconstruction of breast:  Right chest skin excision (no malignancy); MediPort removal   -07/14/2021:  2nd stage reconstruction: Left breast reduction, right nipple creation, fat grafting to right breast, and abdominal scar revision  -09/20/2021:  Pelvic ultrasound (adnexal mass):  Lesion in the uterus is most probably related to fibroids, 1 of them exophytic, adjacent to the left ovary  -11/10/2021:  Thin prep cervical Pap smear: NILM  -12/15/2021: MRI pelvis with and without contrast (adnexal mass; comparison pelvic ultrasound 09/20/2021, CT A/P 11/22/2019):  Enhancing 4 cm mass position between uterine fundus and left ovary, stable going back to 2019, likely an exophytic fibroid  -developed right upper extremity lymphedema post axillary lymph node dissection; used lymphedema sleeve and machine  -03/23/2022:  Complains of right breast pain/soreness, likely attributed to extra activity and Decadron eating this holiday season; noticed Noticed a deformity right breast as well as a tender nodule right breast: Operation performed:  Right  breast reconstruction revision with excision of fat necrosis; fat grafting into right breast 27 cc   Right breast reconstruction (breast reconstruction revision)  -07/07/2022: DEXA scan (comparison:  07/01/2020):  Normal BMD; however, BMD has decreased compared to prior  -12/09/2022: MRI pelvis with and without contrast (comparison:  12/15/2021) (uterine fibroids):  Similar appearance of fibroid uterus (exophytic mass towards the fundus up to 3.5 cm, similar to prior, enhancing avidly after contrast; <myometrial mass up to 1.5 cm, also stable)  -07/13/2023: DEXA scan (comparison:  DEXA scan 07/07/2022):  Normal BMD  -07/26/2023:  Pelvic ultrasound (comparison:  09/20/2021) (benign neoplasm):  Uterine fibroids stable as compared with last examination of 2021 (2 uterine masses, 3 cm x 2.5 cm x 2.5 cm, previously 3.2 x 2.5 x 2.4 cm, essentially stable; 2nd lesion 1 cm x 7 mm x 9 mm, previously 9 mm x 1.1 cm x 7 mm, stable)  -03/13/2024:  Bilateral screening mammogram, left diagnostic mammogram, limited ultrasound left breast (S/P right breast reconstruction and left breast reduction mammoplasty 06/2021):  Right breast benign (BI-RADS 2); left breast suspicious abnormality (BI-RADS 4) (left upper outer breast 7 mm enhancing focal asymmetry, lesion of concern)  -03/22/2024:  Stereotactic biopsy left breast (left upper outer breast 7 mm enhancing focal asymmetry, posterior depth):  Invasive lobular carcinoma, largest focus 2.1 mm  ER 25%; UT 0%; HER2 score 0; Ki-67 low proliferation (1%)  Presents for a follow-up visit.  Doing well.  Appetite is okay.  Compliant with Arimidex.  Also, on ovarian function suppression with monthly Lupron.  No unusual headaches, focal neurological symptoms, chest pain, cough, dyspnea, abdominal pain, nausea, vomiting, change in bowel habits, GI bleeding, bone pains, anorexia, unintentional weight loss, weakness, fatigue, etc..  ECOG 0.      Medications:  Current Outpatient Medications on File  Prior to Visit   Medication Sig Dispense Refill    anastrozole (ARIMIDEX) 1 mg Tab Take 1 tablet (1 mg total) by mouth once daily. 30 tablet 4    calcium carbonate (OS-MARK) 600 mg calcium (1,500 mg) Tab Take 600 mg by mouth.      docusate sodium (COLACE) 100 MG capsule Take 1 capsule (100 mg total) by mouth 2 (two) times daily as needed for Constipation. 90 capsule 1    DULoxetine (CYMBALTA) 60 MG capsule Take 1 capsule (60 mg total) by mouth every evening. 30 capsule 4    gabapentin (NEURONTIN) 400 MG capsule Take 1 capsule (400 mg total) by mouth 3 (three) times daily. 120 capsule 3    hydrOXYzine HCL (ATARAX) 10 MG Tab Take 10 mg by mouth.      lisinopriL-hydrochlorothiazide (PRINZIDE,ZESTORETIC) 10-12.5 mg per tablet Take 1 tablet by mouth once daily. 90 tablet 3    ondansetron (ZOFRAN) 4 MG tablet TAKE 1 TABLET BY MOUTH EVERY 8 HOURS AS NEEDED FOR NAUSEA 30 tablet 3    pantoprazole (PROTONIX) 40 MG tablet Take 1 tablet (40 mg total) by mouth once daily. 30 tablet 3    tamoxifen (NOLVADEX) 10 MG Tab tamoxifen Take No date recorded No form recorded No frequency recorded No route recorded No set duration recorded No set duration amount recorded active No dosage strength recorded No dosage strength units of measure recorded      vitamin D (VITAMIN D3) 1000 units Tab Take 1 tablet (1,000 Units total) by mouth once daily. 60 tablet 3     No current facility-administered medications on file prior to visit.       Review of Systems:   All systems reviewed and found to be negative except for the symptoms detailed above    Physical Examination:   VITAL SIGNS:   Vitals:    04/15/24 1009   BP: 131/85   Pulse: 92   Resp: 18   Temp: 98.4 °F (36.9 °C)     GENERAL:  In no apparent distress.    HEAD:  No signs of head trauma.  EYES:  Pupils are equal.  Extraocular motions intact.    EARS:  Hearing grossly intact.  MOUTH:  Oropharynx is normal.   NECK:  No adenopathy, no JVD.     CHEST:  Chest with clear breath sounds  bilaterally.  No wheezes, rales, rhonchi.    CARDIAC:  Regular rate and rhythm.  S1 and S2, without murmurs, gallops, rubs.  VASCULAR:  No Edema.  Peripheral pulses normal and equal in all extremities.  ABDOMEN:  Soft, without detectable tenderness.  No sign of distention.  No   rebound or guarding, and no masses palpated.   Bowel Sounds normal.  MUSCULOSKELETAL:  Good range of motion of all major joints. Extremities without clubbing, cyanosis or edema.    NEUROLOGIC EXAM:  Alert and oriented x 3.  No focal sensory or strength deficits.   Speech normal.  Follows commands.  PSYCHIATRIC:  Mood normal.  11/11/2019: Bilateral breast examination was performed with her verbal consent, in the presence of her mother and daughter, and the presence of ESSIE Medina; right breast is large, with orange peel appearance (erythema, erythematous and dimpled skin) involving <1/3 of the skin in the periareolar, 6:00, 3:00, and 9:00 positions; no nipple discharge; no palpable regional lymphadenopathy; left breast free from any suspicious lesions/masses or regional lymphadenopathy. No palpable hepatomegaly. Lungs clear to auscultation.  11/25/2019: Breast examination performed with her verbal consent, in the presence of ESSIE Medina; right breast is diffusely enlarged, approximately 13 cm x 12 cm enlargement in the lower quadrants; orange peel appearance of skin is noted along with some erythema, involving 3 o'clock position, 6 o'clock position, 9 o'clock position, and periareolar area, definitely approximately 50% of the skin of the breast.  02/05/2020: Right breast examined with her verbal consent, in the presence of nursing staff, and with her verbal consent: Right breast is much smaller and much softer without discretely palpable lump; no skin retraction, skin ulceration, fixation, erythema, or orange peel appearance; no nipple retraction; no palpable lymphadenopathy in axillary, infraclavicular, supraclavicular, or cervical  areas.  03/18/2020: Right breast examined with her verbal consent, in the presence of Prenella, LPN; right breast is completely soft, without discrete masses, but still with some orange peel appearance of the skin in the lower half but without erythema; no palpable regional lymphadenopathy.  04/16/2020: Breast examination performed with her verbal consent, in the presence of Prenella, LPN; right breast is completely soft, without discrete masses; a hint of orange peel appearance of the skin in the lower half of the breast, with a slight hint of erythema; no ulceration or satellite nodules; no fixation to chest wall; no palpable lymphadenopathy in axillary, supraclavicular, or infraclavicular areas.  04/30/2020: Breast examination performed with her verbal consent, in the presence of regular, LPN; right breast remains a supple, without discrete mass, with subtle darkening of skin in the lower half, without skin ulceration/satellitosis/fixation of breast tissue or nipple discharge; no lymphadenopathy palpable in right axilla; no tenderness in the right axilla. No supraclavicular or infraclavicular lymphadenopathy, either.    Results for orders placed or performed in visit on 04/15/24   CBC Auto Differential    Narrative    The following orders were created for panel order CBC Auto Differential.  Procedure                               Abnormality         Status                     ---------                               -----------         ------                     CBC with Differential[[id number]]                                                        Please view results for these tests on the individual orders.   Results for orders placed or performed in visit on 01/29/24   CBC with Differential   Result Value Ref Range    WBC 6.06 4.50 - 11.50 x10(3)/mcL    RBC 4.25 4.20 - 5.40 x10(6)/mcL    Hgb 13.3 12.0 - 16.0 g/dL    Hct 39.7 37.0 - 47.0 %    MCV 93.4 80.0 - 94.0 fL    MCH 31.3 (H) 27.0 - 31.0 pg    MCHC 33.5  33.0 - 36.0 g/dL    RDW 12.0 11.5 - 17.0 %    Platelet 281 130 - 400 x10(3)/mcL    MPV 9.9 7.4 - 10.4 fL    Neut % 54.7 %    Lymph % 36.8 %    Mono % 5.0 %    Eos % 2.3 %    Basophil % 0.5 %    Lymph # 2.23 0.6 - 4.6 x10(3)/mcL    Neut # 3.32 2.1 - 9.2 x10(3)/mcL    Mono # 0.30 0.1 - 1.3 x10(3)/mcL    Eos # 0.14 0 - 0.9 x10(3)/mcL    Baso # 0.03 <=0.2 x10(3)/mcL    IG# 0.04 0 - 0.04 x10(3)/mcL    IG% 0.7 %    NRBC% 0.0 %     Results for orders placed or performed in visit on 01/29/24   Comprehensive Metabolic Panel   Result Value Ref Range    Sodium Level 143 136 - 145 mmol/L    Potassium Level 3.7 3.5 - 5.1 mmol/L    Chloride 110 (H) 98 - 107 mmol/L    Carbon Dioxide 24 22 - 29 mmol/L    Glucose Level 142 (H) 74 - 100 mg/dL    Blood Urea Nitrogen 12.5 7.0 - 18.7 mg/dL    Creatinine 0.78 0.55 - 1.02 mg/dL    Calcium Level Total 9.2 8.4 - 10.2 mg/dL    Protein Total 8.1 6.4 - 8.3 gm/dL    Albumin Level 3.7 3.5 - 5.0 g/dL    Globulin 4.4 (H) 2.4 - 3.5 gm/dL    Albumin/Globulin Ratio 0.8 (L) 1.1 - 2.0 ratio    Bilirubin Total 0.4 <=1.5 mg/dL    Alkaline Phosphatase 141 40 - 150 unit/L    Alanine Aminotransferase 20 0 - 55 unit/L    Aspartate Aminotransferase 21 5 - 34 unit/L    eGFR >60 mls/min/1.73/m2       Assessment:  Problem List Items Addressed This Visit          Neuro    Chemotherapy-induced neuropathy       Renal/    Uterine fibroid    Dysuria    Premenopausal patient       Oncology    Lobular carcinoma of left breast - Primary    Invasive ductal carcinoma of right breast in female    H/O right mastectomy    Family history of breast cancer    Family history of uterine cancer     Other Visit Diagnoses       Infiltrating ductal carcinoma of breast, right              Multifocal IDC right breast:  -at least 5 breast masses; multiple abnormal lymph nodes in axilla on mammogram and ultrasound 09/24/2019  -biopsy 10/01/2019: IDC, intermediate grade, at least 1.2 cm, along with DCIS; right axillary lymph node biopsy  positive as well  -ER 93.2%; CO 90.3%; HER2 negative (IHC and FISH testing); Ki-67 15.3% (borderline positive)  -physical exam 11/11/2019:  Orange peel appearance of skin of right breast, involving <1/3 of the skin   -no palpable regional lymphadenopathy but right axillary lymph node biopsy positive on biopsy  -staging bone scan and CTs 11/2019: No metastases  -TTE 11/2019: LVEF 60%  -cT4b cN1 M0, AJCC anatomic stage IIIB, clinical prognostic stage stage IIIB  -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  -cT4d cN1 M0, AJCC anatomic stage IIIB, clinical prognostic stage stage IIIB  (Biopsy positive axillary lymph node)  -ER 93.2%; CO 90.3%; HER2 negative (IHC and FISH testing); Ki-67 15.3% (borderline positive)  -Neoadjuvant DDAC x4 (12/13/2019-01/24/2020), clinically, with good response  -Neoadjuvant weekly Taxol x12 (02/07/2020-04/23/2020)   -05/27/2020: Simple right mastectomy, right axillary lymph node dissection:  Multiple foci of IDC, largest at least 2.0 cm, overall grade 1; DCIS also present; IDC margin 3.4 cm; DCIS margin 3.4 cm; 3 lymph nodes with macrometastases; 2 lymph nodes with micrometastasis; no definite response to neoadjuvant chemotherapy in IDC; LVI present  >>ypT2 ypN2a (2.0 cm tumor; 5 lymph nodes positive) (no definitive response to neoadjuvant chemotherapy)  ER positive (60%). CO positive (99%). HER-2 not overexpressed (score 0). Ki-67 low proliferation (2%)  -No metastasis (CT C/A/P, bone scan: 07/01/2020; brain MRI: 07/07/2020)  -Normal BMD (DEXA: 07/01/2020)  -S/P adjuvant radiotherapy to right mastectomy bed 06/02/2020-08/07/2020  -for adjuvant endocrine therapy, started on monthly Lupron shots 07/28/2020  -for adjuvant endocrine therapy, Arimidex started 09/20/2020  -03/24/2021:  Right BANG reconstruction of breast:  Right chest skin excision (no malignancy); MediPort removal   -07/14/2021:  2nd stage reconstruction: Left breast reduction, right nipple creation, fat grafting to right  breast, and abdominal scar revision  -pelvic ultrasound 09/20/2021:  Uterine fibroid, 1 of them exophytic, adjacent to left ovary   -Pap smear 11/10/2021: NILM  -pelvic MRI 12/15/2021:  Exophytic uterine fibroid 4 cm between uterine fundus and left ovary, stable going back to 2019  -developed right upper extremity lymphedema post axillary lymph node dissection; used lymphedema sleeve and machine  -03/23/2022:  Right breast reconstruction revision with excision of fat necrosis and fat grafting to right breast  -DEXA scan 07/07/2020: Normal BMD but decreased  -pelvic MRI 12/09/2022:  Stable uterine fibroids (exophytic mass towards the fundus up to 3.5 cm, stable; 1.5 cm myometrial mass, stable)  -pelvic ultrasound 07/26/2023:  Stable uterine fibroids  >>>  Metachronous hormone receptor positive invasive lobular carcinoma of left breast:  -mammogram/ultrasound 03/13/2024:  Left breast BI-RADS 4: Left upper outer breast 7 mm enhancing focal asymmetry, lesion of concern   -stereotactic biopsy left upper outer breast 7 mm lesion 03/22/2024:  Invasive lobular carcinoma, largest focus 2.1 mm   -ER 25%; SD 0%; HER2 score 0; Ki-67 low proliferation (1%)      Taxol induced peripheral neuropathy:  -Gabapentin started 02/19/2020  -Cymbalta 60 mg p.o. nightly started 03/18/2020      Premenopausal as of 11/11/2019 by history:  -as of 07/09/2020, no menstrual cycles since December 2019 (after starting neoadjuvant chemotherapy)      Family history of breast cancer and uterine cancers  -12/02/2019: Genetic testing: Three (3) variants of uncertain significance (VUS): AXIN2 c.1573C>G (p.Efe385Ovh), msh3 c.2005C>T (p.Zvk793Vjg), and POLE c.1007A>G (p.Hye072Kyv)      Exophytic fibroid versus left adnexal mass (4 cm, solid) on CT 11/22/2019:  -12/03/2019: Pelvic ultrasound: No discrete sonographic pathology  -pelvic ultrasound 09/20/2021:  Uterine fibroid, 1 of them exophytic, adjacent to left ovary   -Pap smear 11/10/2021: NILM  -pelvic  MRI 12/15/2021:  Exophytic uterine fibroid 4 cm between uterine fundus and left ovary, stable going back to 2019  -pelvic MRI 12/09/2022:  Stable uterine fibroids (exophytic mass towards the fundus up to 3.5 cm, stable; 1.5 cm myometrial mass, stable)  -pelvic ultrasound 07/26/2023:  Stable uterine fibroids      Plan:  Lupron shots to continue until July 2025 only  Check FSH and serum estradiol level prior to next dose of Lupron for ovarian function assessment  Stage with contrast-enhanced CT scans of C/A/P and whole-body nuclear medicine bone scan at this time  On recent left breast biopsy, please order NGS testing   Please also order liquid testing  Refer to surgery for resection of new left breast cancer  No indication of neoadjuvant chemotherapy  DEXA scan now  Continue gabapentin and Cymbalta for Taxol induced peripheral neuropathy  Currently, patient is on adjuvant Lupron and Arimidex; has failed; discontinue Arimidex  At appropriate time, we will start tamoxifen for adjuvant endocrine therapy along with ongoing ovarian function suppression with Lupron  Follow-up in 2 weeks  ---------------------------------------      -multifocal IDC right breast, at least 5 breast masses on ultrasound and mammogram, biopsy 10/01/2019  -inflammatory breast carcinoma on presentation, on physical exam 11/25/2019  -cT4d cN1 M0, AJCC anatomic stage IIIB, clinical prognostic stage IIIB  -ER 93.2%, RI 90.3%, HER2 negative, Ki 6715.3%   -S/P neoadjuvant ddAC x4, then weekly Taxol X 12 (12/13/2019-04/23/2020), clinically with good response   -S/P simple right mastectomy and right axillary lymph node dissection 05/27/2020:  No definite response to neoadjuvant chemotherapy  -ypT2 ypN2a (2.0 cm residual tumor; 5 lymph nodes positive including 3 lymph nodes with macrometastases and 2 lymph nodes with micrometastasis)  ER 60%, RI 99%, HER2 score 0, Ki-67 2%  -no metastases on CT C/A/P, bone scan 07/01/2020  -no metastases on brain MRI  07/07/2020   -DEXA scan 07/01/2020: Normal BMD  -S/P adjuvant radiotherapy to right mastectomy bed 06/02/2020-08/07/2020  -for adjuvant endocrine therapy, started on monthly Lupron shots 07/28/2020  -for adjuvant endocrine therapy, Arimidex started 09/20/2020  -03/24/2021:  Right BANG reconstruction of breast:  Right chest skin excision (no malignancy); MediPort removal   -07/14/2021:  2nd stage reconstruction: Left breast reduction, right nipple creation, fat grafting to right breast, and abdominal scar revision  -pelvic ultrasound 09/20/2021:  Uterine fibroid, 1 of them exophytic, adjacent to left ovary   -Pap smear 11/10/2021: NILM  -pelvic MRI 12/15/2021:  Exophytic uterine fibroid 4 cm between uterine fundus and left ovary, stable going back to 2019  -developed right upper extremity lymphedema post axillary lymph node dissection; used lymphedema sleeve and machine  -03/23/2022:  Right breast reconstruction revision with excision of fat necrosis and fat grafting to right breast  -DEXA scan 07/07/2020: Normal BMD but decreased  -pelvic MRI 12/09/2022:  Stable uterine fibroids (exophytic mass towards the fundus up to 3.5 cm, stable; 1.5 cm myometrial mass, stable)  -pelvic ultrasound 07/26/2023:  Stable uterine fibroids  >>>  Metachronous hormone receptor positive invasive lobular carcinoma of left breast:  -mammogram/ultrasound 03/13/2024:  Left breast BI-RADS 4: Left upper outer breast 7 mm enhancing focal asymmetry, lesion of concern   -stereotactic biopsy left upper outer breast 7 mm lesion 03/22/2024:  Invasive lobular carcinoma, largest focus 2.1 mm   -ER 25%; WA 0%; HER2 score 0; Ki-67 low proliferation (1%)  >>>  -per SOFT and TEXT trials, optimal duration of ovarian suppression therapy is 5 years (07/28/2020-07/2025); no efficacy or safety data to support prolonged ovarian function suppression  -premenopausal patient wishing to continue adjuvant endocrine therapy after ovarian function suppression is  stopped, should use tamoxifen  -menopausal status can not be determined while receiving OFS  -check FSH and serum estradiol prior to next dose of Lupron for ovarian function assessment  -Currently, patient is on adjuvant Lupron and Arimidex; has failed; discontinue Arimidex  At appropriate time, we will start tamoxifen for adjuvant endocrine therapy along with ongoing ovarian function suppression with Lupron  -now, has developed metachronous invasive lobular carcinoma of left breast, 7 mm on imaging  -stage with contrast-enhanced CT scans of C/A/P and whole-body nuclear medicine bone scan rule out distant metastases  -comprehensive germline and somatic profiling to identify candidate for targeted therapies, is recommended  -in terms of genetic testing, genetic testing (12/02/2019)  was negative in the past  -will order somatic testing on left breast biopsy  -will also order liquid testing  -given tumor characteristics, no indication of neoadjuvant chemotherapy  -recommend upfront resection of ILC left breast after ruling out metastatic disease  -subsequently, adjuvant therapy to depend upon surgical pathology  -modality of adjuvant endocrine therapy will depend upon assessment of ovarian function with serum FSH and estradiol level prior to next dose of Lupron  -repeat DEXA scan in 2 years (07/2022)    Taxol induced peripheral neuropathy  -gabapentin started 02/19/2020; Cymbalta started 03/18/2020    Premenopausal as of 11/11/2019 by history  -as of 07/09/2020, no menstrual cycles since December 2019 post neoadjuvant chemotherapy    -family history of breast cancer and uterine cancers   -genetic testing negative (12/02/2019)    Follow-up in 2 weeks    Above discussed at length with the patient.  All questions answered.    Discussed labs, scans, pathology report, and gave her copies of relevant records.    Development of metastatic invasive lobular carcinoma of left breast discussed.    Plan of management discussed in  detail.  She understands and agrees with this plan.       Follow-up:  No follow-ups on file.

## 2024-04-15 ENCOUNTER — DOCUMENTATION ONLY (OUTPATIENT)
Dept: HEMATOLOGY/ONCOLOGY | Facility: CLINIC | Age: 49
End: 2024-04-15

## 2024-04-15 ENCOUNTER — OFFICE VISIT (OUTPATIENT)
Dept: HEMATOLOGY/ONCOLOGY | Facility: CLINIC | Age: 49
End: 2024-04-15
Attending: INTERNAL MEDICINE

## 2024-04-15 VITALS
SYSTOLIC BLOOD PRESSURE: 131 MMHG | HEIGHT: 66 IN | OXYGEN SATURATION: 100 % | HEART RATE: 92 BPM | BODY MASS INDEX: 30.76 KG/M2 | TEMPERATURE: 98 F | RESPIRATION RATE: 18 BRPM | WEIGHT: 191.38 LBS | DIASTOLIC BLOOD PRESSURE: 85 MMHG

## 2024-04-15 DIAGNOSIS — C50.911 INFILTRATING DUCTAL CARCINOMA OF BREAST, RIGHT: ICD-10-CM

## 2024-04-15 DIAGNOSIS — Z90.11 H/O RIGHT MASTECTOMY: ICD-10-CM

## 2024-04-15 DIAGNOSIS — C50.911 INVASIVE DUCTAL CARCINOMA OF RIGHT BREAST IN FEMALE: ICD-10-CM

## 2024-04-15 DIAGNOSIS — C50.912 LOBULAR CARCINOMA OF LEFT BREAST: Primary | ICD-10-CM

## 2024-04-15 DIAGNOSIS — D25.9 UTERINE LEIOMYOMA, UNSPECIFIED LOCATION: ICD-10-CM

## 2024-04-15 DIAGNOSIS — Z80.49 FAMILY HISTORY OF UTERINE CANCER: ICD-10-CM

## 2024-04-15 DIAGNOSIS — G62.0 CHEMOTHERAPY-INDUCED NEUROPATHY: ICD-10-CM

## 2024-04-15 DIAGNOSIS — R30.0 DYSURIA: ICD-10-CM

## 2024-04-15 DIAGNOSIS — Z80.3 FAMILY HISTORY OF BREAST CANCER: ICD-10-CM

## 2024-04-15 DIAGNOSIS — T45.1X5A CHEMOTHERAPY-INDUCED NEUROPATHY: ICD-10-CM

## 2024-04-15 DIAGNOSIS — N95.9 PREMENOPAUSAL PATIENT: ICD-10-CM

## 2024-04-15 PROCEDURE — 99215 OFFICE O/P EST HI 40 MIN: CPT | Mod: S$PBB,,, | Performed by: INTERNAL MEDICINE

## 2024-04-15 NOTE — NURSING
LIBERTY Lord met with patient after her appointment with Dr. Fraser regarding insurance. Patient says that she does not have any medical coverage since she lost her medicaid benefits. Says she is not aware of having [facility] Exchange Plan and that she plans to go to Financial Assistance office today to get some answers.  Informed patient that if she does have [facility] Exchange Plan we would need to transfer her care to [facility] as they are in network with that plan. Patient agreed to contact LIBERTY Lord once she has some information. Contact information provided.

## 2024-04-15 NOTE — Clinical Note
Lupron shots to continue until July 2025 only Check FSH and serum estradiol level prior to next dose of Lupron for ovarian function assessment Stage with contrast-enhanced CT scans of C/A/P and whole-body nuclear medicine bone scan at this time On recent left breast biopsy, please order NGS testing  Please also order liquid testing Refer to surgery for resection of new left breast cancer No indication of neoadjuvant chemotherapy DEXA scan now Continue gabapentin and Cymbalta for Taxol induced peripheral neuropathy Follow-up in 2 weeks

## 2024-04-15 NOTE — Clinical Note
Currently, patient is on adjuvant Lupron and Arimidex; has failed; discontinue Arimidex At appropriate time, we will start tamoxifen for adjuvant endocrine therapy along with ongoing ovarian function suppression with Lupron

## 2024-04-18 ENCOUNTER — OFFICE VISIT (OUTPATIENT)
Dept: SURGERY | Facility: CLINIC | Age: 49
End: 2024-04-18
Payer: COMMERCIAL

## 2024-04-18 VITALS
HEIGHT: 66 IN | RESPIRATION RATE: 20 BRPM | WEIGHT: 192 LBS | BODY MASS INDEX: 30.86 KG/M2 | HEART RATE: 90 BPM | DIASTOLIC BLOOD PRESSURE: 86 MMHG | OXYGEN SATURATION: 99 % | TEMPERATURE: 98 F | SYSTOLIC BLOOD PRESSURE: 128 MMHG

## 2024-04-18 DIAGNOSIS — C50.919 INVASIVE LOBULAR CARCINOMA OF BREAST IN FEMALE: Primary | ICD-10-CM

## 2024-04-18 DIAGNOSIS — C50.911 INFILTRATING DUCTAL CARCINOMA OF BREAST, RIGHT: ICD-10-CM

## 2024-04-18 PROCEDURE — 99215 OFFICE O/P EST HI 40 MIN: CPT | Mod: PBBFAC

## 2024-04-18 NOTE — PROGRESS NOTES
Breast Surgery Clinic Note      CC: Reoccurrence of breast cancer     Subjective  Jeni Mejia is a 48 y.o. female with PMHx of invasive IDC right breast, inflammatory carcinoma, cT4d cN1 M0, AJCC stage IIIB,  S/P biopsy 10/01/2019, ER/IN +/+,  Ki 6715-3%, S/P neoadjuvant therapy, followed by simple right mastectomy and right axillary lymph node dissection 05/27/2020, and delayed right breast reconstruction, presenting for evaluation of cancer reoccurrence of the left breast. She has been following with Dr. Fraser for her right breast cancer. She recently obtained MMG which showed BIRADS-4 of her left breast. Biopsy was consistent with invasive lobular carcinoma of the left breast. She only reports that she has been feeling tired recently. She denies any unexplained weight loss, chills, night sweats, fevers, N/V.    She has no cardiac history and takes no blood thinning medications. She has never had issues with anesthesia and can ambulate independently without feeling SOB.      ROS  Negative unless specified above   PMHx:   Past Medical History:   Diagnosis Date    Breast cancer     HTN (hypertension)      PSHx:   Past Surgical History:   Procedure Laterality Date    AUGMENTATION OF BREAST Left     BREAST SURGERY Right     tram flap of rt breast    MASTECTOMY Right     TUBAL LIGATION       FHx:   Breast Cancer on Mother's side.   Meds:   Current Outpatient Medications on File Prior to Visit   Medication Sig Dispense Refill    DULoxetine (CYMBALTA) 60 MG capsule Take 1 capsule (60 mg total) by mouth every evening. 30 capsule 4    gabapentin (NEURONTIN) 400 MG capsule Take 1 capsule (400 mg total) by mouth 3 (three) times daily. 120 capsule 3    hydrOXYzine HCL (ATARAX) 10 MG Tab Take 10 mg by mouth.      lisinopriL-hydrochlorothiazide (PRINZIDE,ZESTORETIC) 10-12.5 mg per tablet Take 1 tablet by mouth once daily. 90 tablet 3    ondansetron (ZOFRAN) 4 MG tablet TAKE 1 TABLET BY MOUTH EVERY 8 HOURS AS NEEDED FOR  NAUSEA 30 tablet 3    pantoprazole (PROTONIX) 40 MG tablet Take 1 tablet (40 mg total) by mouth once daily. 30 tablet 3    vitamin D (VITAMIN D3) 1000 units Tab Take 1 tablet (1,000 Units total) by mouth once daily. 60 tablet 3    anastrozole (ARIMIDEX) 1 mg Tab Take 1 tablet (1 mg total) by mouth once daily. (Patient not taking: Reported on 4/18/2024.) 30 tablet 4    calcium carbonate (OS-MARK) 600 mg calcium (1,500 mg) Tab Take 600 mg by mouth. (Patient not taking: Reported on 4/18/2024)      docusate sodium (COLACE) 100 MG capsule Take 1 capsule (100 mg total) by mouth 2 (two) times daily as needed for Constipation. (Patient not taking: Reported on 4/18/2024) 90 capsule 1    tamoxifen (NOLVADEX) 10 MG Tab tamoxifen Take No date recorded No form recorded No frequency recorded No route recorded No set duration recorded No set duration amount recorded active No dosage strength recorded No dosage strength units of measure recorded (Patient not taking: Reported on 4/18/2024.)       No current facility-administered medications on file prior to visit.     Social: Denies smoking, EtOH, illicit drug use  Allergies:   Review of patient's allergies indicates:   Allergen Reactions    Sulfa (sulfonamide antibiotics) Itching and Rash     Other reaction(s): RASH, DIFFICULTY BREATHIN    Sulfamethoxazole-trimethoprim Itching and Rash     rash          Objective  Vitals:    04/18/24 1342   BP: 128/86   Pulse: 90   Resp: 20   Temp: 98.4 °F (36.9 °C)        Gen: Pt in NAD  HEENT: NC/AT  CV: RR  Resp: equal chest rise  Abd: soft, NT  Ext: Moving all extremities  Breast: Non-erythematous breasts, no drainage noted, no lumps felt bilaterally. Bilateral well healed scars from previous surgeries in intertriginous areas of the breasts     Results  MMG  IMPRESSION: SUSPICIOUS OF MALIGNANCY  Right Breast: Benign (BI-RADS 2)  Left Breast: Suspicious abnormality (BI-RADS 4).  Tissue diagnosis is recommended.  Core Needle Biopsy  Left upper  outer breast 7 mm enhancing focal asymmetry, posterior depth, Contrast-enhanced Stereotactic-guided core biopsy:  - Invasive lobular carcinoma, the largest focus measures 2.1 mm.  - Sclerosing adenosis, usual ductal hyperplasia and columnar cell change.    - Microcalcifications are identified.     Assessment/Plan  Jeni Mejia is a 48 y.o. female with PMHx of invasive IDC right breast, inflammatory carcinoma, cT4d cN1 M0, AJCC stage IIIB,  S/P biopsy 10/01/2019, ER/MD +/+,  Ki 6715-3%, S/P neoadjuvant therapy, followed by simple right mastectomy and right axillary lymph node dissection 05/27/2020, and delayed right breast reconstruction, presenting for evaluation of cancer reoccurrence of the left breast. MMG left breast BIRADS-4 and core needle biopsy of L breast showing invasive lobular carcinoma. She has no cardiac history and denies taking blood thinners.     -Discussed with patient regarding lumpectomy of left breast vs total mastectomy with subsequent immediate vs delayed breast reconstruction. Pt is amenable to total L breast mastectomy with immediate breast reconstruction  -Will refer pt to breast surgery at [facility] as we are unable to do immediate breast reconstruction at [facility].   -Timing of surgery to be coordinated between Breast and Plastic Surgery    Ion Campos, MS3

## 2024-04-18 NOTE — PROGRESS NOTES
Seen by Dr. Lombardi.  Patient referred to [facility] Breast Surgery as requested. Disc requested from radiology. RTC prn.

## 2024-04-18 NOTE — PROGRESS NOTES
Breast Surgery Clinic Note      CC: Reoccurrence of breast cancer     Subjective  Jeni Mejia is a 48 y.o. female with PMHx of invasive IDC and inflammatory breast cancer of the right breast, cT4d cN1 M0, AJCC stage IIIB, ER/ND positive, Her-2 negative, s/p neoadjuvant chemotherapy, MRM (2020) with delayed R breast reconstruction with BANG flap and L breast reduction with PRS in [place] (2021) who presents for evaluation of newly diagnosed cancer of the left  breast. She has been following with Dr. Fraser for her right breast cancer. She recently obtained MMG which showed a 7mm lesion in her L upper outer breast with 7mm enhancing focal asymmetery that was BIRADS-4. Biopsy was consistent with invasive lobular carcinoma of the left breast. She only reports that she has been feeling tired recently. She denies any unexplained weight loss, chills, night sweats, fevers, N/V or there masses in her breasts. She denies nipple discharge.    She is currently taking Lupron. Per Dr. Fraser, a CT chest/abdomen/pelvis was ordered.     She has no cardiac history and takes no blood thinning medications. She has never had issues with anesthesia and can ambulate independently without feeling SOB.      ROS  Negative unless specified above     PMHx:   Past Medical History:   Diagnosis Date    Breast cancer     HTN (hypertension)      PSHx:   As per HPI    FHx:   Breast Cancer in her MGM, multiple maternal cousins    Social: Denies smoking, EtOH, illicit drug use    Allergies:   Review of patient's allergies indicates:   Allergen Reactions    Sulfa (sulfonamide antibiotics) Itching and Rash     Other reaction(s): RASH, DIFFICULTY BREATHIN    Sulfamethoxazole-trimethoprim Itching and Rash     rash        Objective  Vitals:    04/18/24 1342   BP: 128/86   Pulse: 90   Resp: 20   Temp: 98.4 °F (36.9 °C)     Gen: Pt in NAD  HEENT: NC/AT  CV: RR  Resp:unlabored breathing on RA  Abd: soft, NT  Ext: Moving all extremities  Breast:  Previous R MRM with BANG flap, old scars present. Scar under L breast well healed. No masses palpated bilaterally  Lymph: no axillary, cervical, supraclavicular lymphadenopathy palpated bilaterally     Results  MMG  IMPRESSION: SUSPICIOUS OF MALIGNANCY  Right Breast: Benign (BI-RADS 2)  Left Breast: Suspicious abnormality (BI-RADS 4).  Tissue diagnosis is recommended.  Core Needle Biopsy  Left upper outer breast 7 mm enhancing focal asymmetry, posterior depth, Contrast-enhanced Stereotactic-guided core biopsy:  - Invasive lobular carcinoma, the largest focus measures 2.1 mm.  - Sclerosing adenosis, usual ductal hyperplasia and columnar cell change.    - Microcalcifications are identified.     Assessment/Plan  Jeni Mejia is a 48 y.o. 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 Discussed need for adjuvant radiation if partial mastectomy chosen. Discussed risks/benefits with partial vs total mastectomy. Patient at this time would like to pursue total mastectomy with immediate reconstruction as she expresses concern about missing potential foci of cancer and did not want to undergo adjuvant radiation. Patient also expresses desire to have immediate reconstruction as opposed to delayed reconstruction  -Will refer pt to breast surgery at [facility] as patient will require PRS evaluation for reconstruction options.   -RTC PRN    Ion Campos, MS3    PGY-3 Addendum  Patient seen and examined, chart reviewed  independently and with medical student. Agree with findings, changes made accordingly.    Lucila Lombardi MD  [employer] General Surgery, PGY-3

## 2024-04-24 ENCOUNTER — TELEPHONE (OUTPATIENT)
Dept: HEMATOLOGY/ONCOLOGY | Facility: CLINIC | Age: 49
End: 2024-04-24

## 2024-04-24 ENCOUNTER — TELEPHONE (OUTPATIENT)
Dept: INFUSION THERAPY | Facility: HOSPITAL | Age: 49
End: 2024-04-24

## 2024-04-24 NOTE — TELEPHONE ENCOUNTER
Patient phoned regarding her Out of Network insurance with [facility] Exchange plan.  Patient reports she never signed up for these benefits & has a bill for a $600 deductible but she is insistent she has not use the [facility] Exchange plan benefits.  She stated that she called [facility] Exchange plan on 04/15/24 & was told that it would take 30days to discontinue her benefits with them.  I informed patient that she has a lupron injection scheduled on 05/08/24.  Encouraged patient to call [facility] Exchange plan again today.  She voiced understanding.  Informed patient she will remain scheduled for her lupron injection on 05/08/24.

## 2024-04-24 NOTE — NURSING
Attempted follow up phone contact to patient regarding status of insurance benefits, unsuccessful. Unable to leave a voice message.

## 2024-05-03 ENCOUNTER — TELEPHONE (OUTPATIENT)
Dept: HEMATOLOGY/ONCOLOGY | Facility: CLINIC | Age: 49
End: 2024-05-03

## 2024-05-03 NOTE — TELEPHONE ENCOUNTER
Spoke with patient in reference to no insurance coverage. Informed patient that she needed to go to [facility] and patient stated that she was previously instructed to go but she just hasn't had time to go. Informed patient that she has an appt on 5/9/24 with Dr. Fraser and if she couldn't make it to [facility] then she would need to complete an OON prior to appt. Verbalized understanding.

## 2024-05-08 ENCOUNTER — TELEPHONE (OUTPATIENT)
Dept: HEMATOLOGY/ONCOLOGY | Facility: CLINIC | Age: 49
End: 2024-05-08

## 2024-05-08 NOTE — NURSING
Follow up phone contact to patient with LIBERTY Lord and Salima Banegas, supervisor, regarding patient's insurance. Patient was informed that [facility] is not in network with [facility] Exchange Plan. Patient states she no longer has this insurance. After several questions, patient states that [facility] Exchange Plan was termed on 4/16/24 and that she has a letter confirming. Patient says she has no other insurance. Instructed patient to bring her letter with her tomorrow and to go to Financial Assistance in building [facility]. Patient voices understanding and agrees.

## 2024-05-09 ENCOUNTER — OFFICE VISIT (OUTPATIENT)
Dept: HEMATOLOGY/ONCOLOGY | Facility: CLINIC | Age: 49
End: 2024-05-09
Attending: INTERNAL MEDICINE

## 2024-05-09 ENCOUNTER — DOCUMENTATION ONLY (OUTPATIENT)
Dept: HEMATOLOGY/ONCOLOGY | Facility: CLINIC | Age: 49
End: 2024-05-09

## 2024-05-09 ENCOUNTER — INFUSION (OUTPATIENT)
Dept: INFUSION THERAPY | Facility: HOSPITAL | Age: 49
End: 2024-05-09
Attending: INTERNAL MEDICINE

## 2024-05-09 VITALS
TEMPERATURE: 98 F | SYSTOLIC BLOOD PRESSURE: 122 MMHG | HEART RATE: 82 BPM | OXYGEN SATURATION: 100 % | BODY MASS INDEX: 30.86 KG/M2 | WEIGHT: 192 LBS | DIASTOLIC BLOOD PRESSURE: 89 MMHG | RESPIRATION RATE: 19 BRPM | HEIGHT: 66 IN

## 2024-05-09 DIAGNOSIS — D25.9 UTERINE LEIOMYOMA, UNSPECIFIED LOCATION: ICD-10-CM

## 2024-05-09 DIAGNOSIS — C50.919 MALIGNANT NEOPLASM OF FEMALE BREAST, UNSPECIFIED ESTROGEN RECEPTOR STATUS, UNSPECIFIED LATERALITY, UNSPECIFIED SITE OF BREAST: Primary | ICD-10-CM

## 2024-05-09 DIAGNOSIS — N95.9 PREMENOPAUSAL PATIENT: ICD-10-CM

## 2024-05-09 DIAGNOSIS — G62.0 CHEMOTHERAPY-INDUCED NEUROPATHY: Primary | ICD-10-CM

## 2024-05-09 DIAGNOSIS — Z80.49 FAMILY HISTORY OF UTERINE CANCER: ICD-10-CM

## 2024-05-09 DIAGNOSIS — C50.911 INVASIVE DUCTAL CARCINOMA OF RIGHT BREAST IN FEMALE: ICD-10-CM

## 2024-05-09 DIAGNOSIS — T45.1X5A CHEMOTHERAPY-INDUCED NEUROPATHY: Primary | ICD-10-CM

## 2024-05-09 DIAGNOSIS — Z80.3 FAMILY HISTORY OF BREAST CANCER: ICD-10-CM

## 2024-05-09 DIAGNOSIS — C50.912 LOBULAR CARCINOMA OF LEFT BREAST: ICD-10-CM

## 2024-05-09 DIAGNOSIS — Z90.11 H/O RIGHT MASTECTOMY: ICD-10-CM

## 2024-05-09 PROCEDURE — 99213 OFFICE O/P EST LOW 20 MIN: CPT | Mod: S$PBB,,, | Performed by: INTERNAL MEDICINE

## 2024-05-09 PROCEDURE — 63600175 PHARM REV CODE 636 W HCPCS: Mod: JZ,JG | Performed by: INTERNAL MEDICINE

## 2024-05-09 PROCEDURE — 96402 CHEMO HORMON ANTINEOPL SQ/IM: CPT

## 2024-05-09 PROCEDURE — 99214 OFFICE O/P EST MOD 30 MIN: CPT | Mod: PBBFAC,25 | Performed by: INTERNAL MEDICINE

## 2024-05-09 RX ADMIN — LEUPROLIDE ACETATE 3.75 MG: KIT at 10:05

## 2024-05-09 NOTE — Clinical Note
Lupron shots to continue until July 2025 only Check FSH and serum estradiol level prior to next dose of Lupron for ovarian function assessment Stage with contrast-enhanced CT scans of C/A/P and whole-body nuclear medicine bone scan at this time On recent left breast biopsy, please order NGS testing  Refer to surgery for resection of new left breast cancer No indication of neoadjuvant chemotherapy DEXA scan now Continue gabapentin and Cymbalta for Taxol induced peripheral neuropathy Currently, patient is on adjuvant Lupron and Arimidex; has failed; discontinue Arimidex At appropriate time, we will start tamoxifen for adjuvant endocrine therapy along with ongoing ovarian function suppression with Lupron Follow-up in 2 weeks with results of all investigations; please do not schedule the patient without results of requested investigations

## 2024-05-09 NOTE — PROGRESS NOTES
History:  Past Medical History:   Diagnosis Date    Breast cancer     HTN (hypertension)    Past medical history: Hypertension. Obesity. Tubal ligation in 1999.  -Right breast at 6:00, FNA (05/26/2016): Cyst with apocrine metaplasia    Social history: Single. Lives in [place], [place]. Has 2 children. She is an  at a nursing home. Denies history of tobacco, alcohol, or illicit drug abuse.    Family history: Maternal grandmother experienced breast cancer twice, in her 50s, and then in her 80s. One maternal cousin experienced uterine cancer in her 40s. Another maternal cousin experienced uterine cancer in her 50s. Mother's maternal uncle experienced some kind of cancer in the 70s.    Health maintenance:  ThinPrep cervical Pap smear (07/25/2016: Negative for intraepithelial lesion or malignancy.  Bilateral diagnostic mammogram 05/15/2017, was BI-RADS Category 2, benign.    Menstrual and OB/GYN history: Menarche at age 12. First childbirth at age 18. No history of abortions or miscarriages. Used birth control pills many years back. Currently, as of 11/11/2019, regular and normal menstrual periods.    Past Surgical History:   Procedure Laterality Date    AUGMENTATION OF BREAST Left     BREAST SURGERY Right     tram flap of rt breast    MASTECTOMY Right     TUBAL LIGATION        Social History     Socioeconomic History    Marital status: Single   Tobacco Use    Smoking status: Never    Smokeless tobacco: Never   Substance and Sexual Activity    Alcohol use: Not Currently     Comment: occassionally    Drug use: Never    Sexual activity: Not Currently     Partners: Male     Social Determinants of Health     Financial Resource Strain: Low Risk  (3/30/2023)    Overall Financial Resource Strain (CARDIA)     Difficulty of Paying Living Expenses: Not very hard   Food Insecurity: No Food Insecurity (3/30/2023)    Hunger Vital Sign     Worried About Running Out of Food in the Last Year: Never true     Ran  Out of Food in the Last Year: Never true   Transportation Needs: No Transportation Needs (3/30/2023)    PRAPARE - Transportation     Lack of Transportation (Medical): No     Lack of Transportation (Non-Medical): No   Physical Activity: Inactive (3/30/2023)    Exercise Vital Sign     Days of Exercise per Week: 0 days     Minutes of Exercise per Session: 0 min   Stress: Stress Concern Present (3/30/2023)    [employer] - Occupational Stress Questionnaire     Feeling of Stress : To some extent   Housing Stability: Low Risk  (3/30/2023)    Housing Stability Vital Sign     Unable to Pay for Housing in the Last Year: No     Number of Places Lived in the Last Year: 1     Unstable Housing in the Last Year: No      Family History   Problem Relation Name Age of Onset    Hypertension Mother      No Known Problems Father        Reason for Follow-up:  -metachronous hormone receptor positive invasive lobular carcinoma of left breast, S/P biopsy 03/22/2024   -history of IDC right breast, inflammatory carcinoma, cT4d cN1 M0, AJCC anatomic stage IIIB, clinical prognostic stage IIIB, S/P biopsy 10/01/2019, ER 93.2%, VT 90.3%, HER2 negative, Ki 6715-3%, S/P neoadjuvant therapy, followed by simple right mastectomy and right axillary lymph node dissection 05/27/2020, ypT2 ypN2a, 2.0 cm residual tumor, 3 lymph nodes with macrometastases, 2 lymph nodes with micrometastasis, no definite response to neoadjuvant chemotherapy, then adjuvant radiotherapy and adjuvant endocrine therapy  -Taxol induced peripheral neuropathy  -premenopausal  -family history of breast cancer and uterine cancer  -uterine fibroids    History of Present Illness:   Lobular carcinoma of left breast      Oncologic/Hematologic History:  Oncology History   Invasive ductal carcinoma of right breast in female   10/1/2019 Cancer Staged    Staging form: Breast, AJCC 8th Edition  - Clinical stage from 10/1/2019: Stage IIIB (cT4d, cN1, cM0, G2, ER+,  NM+, HER2-)     5/27/2020 Cancer Staged    Staging form: Breast, AJCC 8th Edition  - Pathologic stage from 5/27/2020: ypT2, pN2a, cM0, G1, ER+, NM+, HER2-     4/13/2024 Initial Diagnosis    Invasive ductal carcinoma of right breast in female     44-year-old female referred by Dr. Stephanie Silva for breast cancer.    Patient with history of inflammatory carcinoma right breast, S/P neoadjuvant chemotherapy, right simple mastectomy, right axillary lymph node dissection, adjuvant radiotherapy, adjuvant endocrine therapy, subsequently developing metachronous invasive lobular carcinoma of left breast.    Please see assessment and plan section for details    1/11/2019:  Presents for initial oncology consultation, accompanied by her mother and daughter. Overall, feels quite well except for some fatigue. Has experienced some right upper extremity numbness, pain, and stinging sensation for last 1 month as a result of this, she is preferring left arm for some activities, like typing, etc. Appetite is good. No unintentional weight loss. No unusual headaches, focal neuro symptoms, chest pain, cough, dyspnea, fevers, chills, abdominal pain, nausea, vomiting, GI bleeding, bone pains, any urinary problems, etc. ECOG 0.    Interval History:  [No matching plan found]   [No matching plan found]     05/09/2024:   -04/15/2024: Liquid biopsy:  Positive for Tier-II abnormality (DNMT3A mutation); negative for other relevant findings like AKT 1, BRCA1, BRCA2, HER2, ESR 1, MLH1, MSH2, MSH6, NTRK1/2/3, PALB2, PIK3CA, PMS2, PTEN, RET, TMB, MSI)  Presents for a follow-up visit.  She has not had any scans on investigations done.  Apparently, she is running into medical insurance issues.  Today, she tells me that scans are scheduled at [facility].  No new symptoms.      Medications:  Current Outpatient Medications on File Prior to Visit   Medication Sig Dispense Refill    DULoxetine (CYMBALTA) 60 MG capsule Take 1 capsule (60 mg total) by  mouth every evening. 30 capsule 4    gabapentin (NEURONTIN) 400 MG capsule Take 1 capsule (400 mg total) by mouth 3 (three) times daily. 120 capsule 3    hydrOXYzine HCL (ATARAX) 10 MG Tab Take 10 mg by mouth.      lisinopriL-hydrochlorothiazide (PRINZIDE,ZESTORETIC) 10-12.5 mg per tablet Take 1 tablet by mouth once daily. 90 tablet 3    ondansetron (ZOFRAN) 4 MG tablet TAKE 1 TABLET BY MOUTH EVERY 8 HOURS AS NEEDED FOR NAUSEA 30 tablet 3    pantoprazole (PROTONIX) 40 MG tablet Take 1 tablet (40 mg total) by mouth once daily. 30 tablet 3    vitamin D (VITAMIN D3) 1000 units Tab Take 1 tablet (1,000 Units total) by mouth once daily. 60 tablet 3    anastrozole (ARIMIDEX) 1 mg Tab Take 1 tablet (1 mg total) by mouth once daily. (Patient not taking: Reported on 4/18/2024.) 30 tablet 4    calcium carbonate (OS-MARK) 600 mg calcium (1,500 mg) Tab Take 600 mg by mouth. (Patient not taking: Reported on 4/18/2024)      docusate sodium (COLACE) 100 MG capsule Take 1 capsule (100 mg total) by mouth 2 (two) times daily as needed for Constipation. (Patient not taking: Reported on 4/18/2024) 90 capsule 1    tamoxifen (NOLVADEX) 10 MG Tab tamoxifen Take No date recorded No form recorded No frequency recorded No route recorded No set duration recorded No set duration amount recorded active No dosage strength recorded No dosage strength units of measure recorded (Patient not taking: Reported on 4/18/2024.)       No current facility-administered medications on file prior to visit.       Review of Systems:   All systems reviewed and found to be negative except for the symptoms detailed above    Physical Examination:   VITAL SIGNS:   Vitals:    05/09/24 0843   BP: 122/89   Pulse: 82   Resp: 19   Temp: 98.3 °F (36.8 °C)       GENERAL:  In no apparent distress.    HEAD:  No signs of head trauma.  EYES:  Pupils are equal.  Extraocular motions intact.    EARS:  Hearing grossly intact.  MOUTH:  Oropharynx is normal.   NECK:  No adenopathy,  no JVD.     CHEST:  Chest with clear breath sounds bilaterally.  No wheezes, rales, rhonchi.    CARDIAC:  Regular rate and rhythm.  S1 and S2, without murmurs, gallops, rubs.  VASCULAR:  No Edema.  Peripheral pulses normal and equal in all extremities.  ABDOMEN:  Soft, without detectable tenderness.  No sign of distention.  No   rebound or guarding, and no masses palpated.   Bowel Sounds normal.  MUSCULOSKELETAL:  Good range of motion of all major joints. Extremities without clubbing, cyanosis or edema.    NEUROLOGIC EXAM:  Alert and oriented x 3.  No focal sensory or strength deficits.   Speech normal.  Follows commands.  PSYCHIATRIC:  Mood normal.  11/11/2019: Bilateral breast examination was performed with her verbal consent, in the presence of her mother and daughter, and the presence of PAULINE MedinaN; right breast is large, with orange peel appearance (erythema, erythematous and dimpled skin) involving <1/3 of the skin in the periareolar, 6:00, 3:00, and 9:00 positions; no nipple discharge; no palpable regional lymphadenopathy; left breast free from any suspicious lesions/masses or regional lymphadenopathy. No palpable hepatomegaly. Lungs clear to auscultation.  11/25/2019: Breast examination performed with her verbal consent, in the presence of Prenella LPN; right breast is diffusely enlarged, approximately 13 cm x 12 cm enlargement in the lower quadrants; orange peel appearance of skin is noted along with some erythema, involving 3 o'clock position, 6 o'clock position, 9 o'clock position, and periareolar area, definitely approximately 50% of the skin of the breast.  02/05/2020: Right breast examined with her verbal consent, in the presence of nursing staff, and with her verbal consent: Right breast is much smaller and much softer without discretely palpable lump; no skin retraction, skin ulceration, fixation, erythema, or orange peel appearance; no nipple retraction; no palpable lymphadenopathy in axillary,  infraclavicular, supraclavicular, or cervical areas.  03/18/2020: Right breast examined with her verbal consent, in the presence of Prenella, LPN; right breast is completely soft, without discrete masses, but still with some orange peel appearance of the skin in the lower half but without erythema; no palpable regional lymphadenopathy.  04/16/2020: Breast examination performed with her verbal consent, in the presence of Prenella, LPN; right breast is completely soft, without discrete masses; a hint of orange peel appearance of the skin in the lower half of the breast, with a slight hint of erythema; no ulceration or satellite nodules; no fixation to chest wall; no palpable lymphadenopathy in axillary, supraclavicular, or infraclavicular areas.  04/30/2020: Breast examination performed with her verbal consent, in the presence of regular, LPN; right breast remains a supple, without discrete mass, with subtle darkening of skin in the lower half, without skin ulceration/satellitosis/fixation of breast tissue or nipple discharge; no lymphadenopathy palpable in right axilla; no tenderness in the right axilla. No supraclavicular or infraclavicular lymphadenopathy, either.    Results for orders placed or performed in visit on 04/15/24   CBC Auto Differential    Narrative    The following orders were created for panel order CBC Auto Differential.  Procedure                               Abnormality         Status                     ---------                               -----------         ------                     CBC with Differential[[id number]]                                                        Please view results for these tests on the individual orders.   Results for orders placed or performed in visit on 01/29/24   CBC with Differential   Result Value Ref Range    WBC 6.06 4.50 - 11.50 x10(3)/mcL    RBC 4.25 4.20 - 5.40 x10(6)/mcL    Hgb 13.3 12.0 - 16.0 g/dL    Hct 39.7 37.0 - 47.0 %    MCV 93.4 80.0 - 94.0 fL     MCH 31.3 (H) 27.0 - 31.0 pg    MCHC 33.5 33.0 - 36.0 g/dL    RDW 12.0 11.5 - 17.0 %    Platelet 281 130 - 400 x10(3)/mcL    MPV 9.9 7.4 - 10.4 fL    Neut % 54.7 %    Lymph % 36.8 %    Mono % 5.0 %    Eos % 2.3 %    Basophil % 0.5 %    Lymph # 2.23 0.6 - 4.6 x10(3)/mcL    Neut # 3.32 2.1 - 9.2 x10(3)/mcL    Mono # 0.30 0.1 - 1.3 x10(3)/mcL    Eos # 0.14 0 - 0.9 x10(3)/mcL    Baso # 0.03 <=0.2 x10(3)/mcL    IG# 0.04 0 - 0.04 x10(3)/mcL    IG% 0.7 %    NRBC% 0.0 %     Results for orders placed or performed in visit on 01/29/24   Comprehensive Metabolic Panel   Result Value Ref Range    Sodium Level 143 136 - 145 mmol/L    Potassium Level 3.7 3.5 - 5.1 mmol/L    Chloride 110 (H) 98 - 107 mmol/L    Carbon Dioxide 24 22 - 29 mmol/L    Glucose Level 142 (H) 74 - 100 mg/dL    Blood Urea Nitrogen 12.5 7.0 - 18.7 mg/dL    Creatinine 0.78 0.55 - 1.02 mg/dL    Calcium Level Total 9.2 8.4 - 10.2 mg/dL    Protein Total 8.1 6.4 - 8.3 gm/dL    Albumin Level 3.7 3.5 - 5.0 g/dL    Globulin 4.4 (H) 2.4 - 3.5 gm/dL    Albumin/Globulin Ratio 0.8 (L) 1.1 - 2.0 ratio    Bilirubin Total 0.4 <=1.5 mg/dL    Alkaline Phosphatase 141 40 - 150 unit/L    Alanine Aminotransferase 20 0 - 55 unit/L    Aspartate Aminotransferase 21 5 - 34 unit/L    eGFR >60 mls/min/1.73/m2       Assessment:  Problem List Items Addressed This Visit          Neuro    Chemotherapy-induced neuropathy - Primary       Renal/    Uterine fibroid    Premenopausal patient       Oncology    Lobular carcinoma of left breast    Invasive ductal carcinoma of right breast in female    H/O right mastectomy    Family history of breast cancer    Family history of uterine cancer       History of multifocal IDC right breast:  -at least 5 breast masses; multiple abnormal lymph nodes in axilla on mammogram and ultrasound 09/24/2019  -biopsy 10/01/2019: IDC, intermediate grade, at least 1.2 cm, along with DCIS; right axillary lymph node biopsy positive as well  -ER 93.2%; MA 90.3%;  HER2 negative (IHC and FISH testing); Ki-67 15.3% (borderline positive)  -physical exam 11/11/2019:  Orange peel appearance of skin of right breast, involving <1/3 of the skin   -no palpable regional lymphadenopathy but right axillary lymph node biopsy positive on biopsy  -staging bone scan and CTs 11/2019: No metastases  -TTE 11/2019: LVEF 60%  -cT4b cN1 M0, AJCC anatomic stage IIIB, clinical prognostic stage stage IIIB  -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  -cT4d cN1 M0, AJCC anatomic stage IIIB, clinical prognostic stage stage IIIB  (Biopsy positive axillary lymph node)  -ER 93.2%; OK 90.3%; HER2 negative (IHC and FISH testing); Ki-67 15.3% (borderline positive)  -Neoadjuvant DDAC x4 (12/13/2019-01/24/2020), clinically, with good response  -Neoadjuvant weekly Taxol x12 (02/07/2020-04/23/2020)   -05/27/2020: Simple right mastectomy, right axillary lymph node dissection:  Multiple foci of IDC, largest at least 2.0 cm, overall grade 1; DCIS also present; IDC margin 3.4 cm; DCIS margin 3.4 cm; 3 lymph nodes with macrometastases; 2 lymph nodes with micrometastasis; no definite response to neoadjuvant chemotherapy in IDC; LVI present  >>ypT2 ypN2a (2.0 cm tumor; 5 lymph nodes positive) (no definitive response to neoadjuvant chemotherapy)  ER positive (60%). OK positive (99%). HER-2 not overexpressed (score 0). Ki-67 low proliferation (2%)  -No metastasis (CT C/A/P, bone scan: 07/01/2020; brain MRI: 07/07/2020)  -Normal BMD (DEXA: 07/01/2020)  -S/P adjuvant radiotherapy to right mastectomy bed 06/02/2020-08/07/2020  -for adjuvant endocrine therapy, started on monthly Lupron shots 07/28/2020  -for adjuvant endocrine therapy, Arimidex started 09/20/2020  -03/24/2021:  Right BANG reconstruction of breast:  Right chest skin excision (no malignancy); MediPort removal   -07/14/2021:  2nd stage reconstruction: Left breast reduction, right nipple creation, fat grafting to right breast, and abdominal scar revision  -pelvic  ultrasound 09/20/2021:  Uterine fibroid, 1 of them exophytic, adjacent to left ovary   -Pap smear 11/10/2021: NILM  -pelvic MRI 12/15/2021:  Exophytic uterine fibroid 4 cm between uterine fundus and left ovary, stable going back to 2019  -developed right upper extremity lymphedema post axillary lymph node dissection; used lymphedema sleeve and machine  -03/23/2022:  Right breast reconstruction revision with excision of fat necrosis and fat grafting to right breast  -DEXA scan 07/07/2020: Normal BMD but decreased  -pelvic MRI 12/09/2022:  Stable uterine fibroids (exophytic mass towards the fundus up to 3.5 cm, stable; 1.5 cm myometrial mass, stable)  -pelvic ultrasound 07/26/2023:  Stable uterine fibroids  >>>  Metachronous hormone receptor positive invasive lobular carcinoma of left breast:  -mammogram/ultrasound 03/13/2024:  Left breast BI-RADS 4: Left upper outer breast 7 mm enhancing focal asymmetry, lesion of concern   -stereotactic biopsy left upper outer breast 7 mm lesion 03/22/2024:  Invasive lobular carcinoma, largest focus 2.1 mm   -ER 25%; OR 0%; HER2 score 0; Ki-67 low proliferation (1%)      Molecular testing:  -genetic testing 12/02/2019:  Essentially negative  -liquid biopsy 04/15/2024: Negative for any relevant findings      Taxol induced peripheral neuropathy:  -Gabapentin started 02/19/2020  -Cymbalta 60 mg p.o. nightly started 03/18/2020      Premenopausal as of 11/11/2019 by history:  -as of 07/09/2020, no menstrual cycles since December 2019 (after starting neoadjuvant chemotherapy)      Family history of breast cancer and uterine cancers  -12/02/2019: Genetic testing: Three (3) variants of uncertain significance (VUS): AXIN2 c.1573C>G (p.Azj655Paa), msh3 c.2005C>T (p.Eys050Ahz), and POLE c.1007A>G (p.Czp302Vvo)      Exophytic fibroid versus left adnexal mass (4 cm, solid) on CT 11/22/2019:  -12/03/2019: Pelvic ultrasound: No discrete sonographic pathology  -pelvic ultrasound 09/20/2021:   Uterine fibroid, 1 of them exophytic, adjacent to left ovary   -Pap smear 11/10/2021: NILM  -pelvic MRI 12/15/2021:  Exophytic uterine fibroid 4 cm between uterine fundus and left ovary, stable going back to 2019  -pelvic MRI 12/09/2022:  Stable uterine fibroids (exophytic mass towards the fundus up to 3.5 cm, stable; 1.5 cm myometrial mass, stable)  -pelvic ultrasound 07/26/2023:  Stable uterine fibroids      Plan:  Lupron shots to continue until July 2025 only  Discontinue Arimidex (has failed)  Check FSH and serum estradiol level prior to next dose of Lupron for ovarian function assessment  Stage with contrast-enhanced CT scans of C/A/P and whole-body nuclear medicine bone scan at this time  On recent left breast biopsy, please order NGS testing   Refer to surgery for resection of new left breast cancer  No indication of neoadjuvant chemotherapy  DEXA scan now  Continue gabapentin and Cymbalta for Taxol induced peripheral neuropathy  Currently, patient is on adjuvant Lupron and Arimidex; has failed; discontinue Arimidex  At appropriate time, we will start tamoxifen for adjuvant endocrine therapy along with ongoing ovarian function suppression with Lupron  Follow-up in 2 weeks with results of all investigations; please do not schedule the patient without results of requested investigations  ---------------------------------------      -multifocal IDC right breast, at least 5 breast masses on ultrasound and mammogram, biopsy 10/01/2019  -inflammatory breast carcinoma on presentation, on physical exam 11/25/2019  -cT4d cN1 M0, AJCC anatomic stage IIIB, clinical prognostic stage IIIB  -ER 93.2%, NV 90.3%, HER2 negative, Ki 6715.3%   -S/P neoadjuvant ddAC x4, then weekly Taxol X 12 (12/13/2019-04/23/2020), clinically with good response   -S/P simple right mastectomy and right axillary lymph node dissection 05/27/2020:  No definite response to neoadjuvant chemotherapy  -ypT2 ypN2a (2.0 cm residual tumor; 5 lymph nodes  positive including 3 lymph nodes with macrometastases and 2 lymph nodes with micrometastasis)  ER 60%, ME 99%, HER2 score 0, Ki-67 2%  -no metastases on CT C/A/P, bone scan 07/01/2020  -no metastases on brain MRI 07/07/2020   -DEXA scan 07/01/2020: Normal BMD  -S/P adjuvant radiotherapy to right mastectomy bed 06/02/2020-08/07/2020  -for adjuvant endocrine therapy, started on monthly Lupron shots 07/28/2020  -for adjuvant endocrine therapy, Arimidex started 09/20/2020  -03/24/2021:  Right BANG reconstruction of breast:  Right chest skin excision (no malignancy); MediPort removal   -07/14/2021:  2nd stage reconstruction: Left breast reduction, right nipple creation, fat grafting to right breast, and abdominal scar revision  -pelvic ultrasound 09/20/2021:  Uterine fibroid, 1 of them exophytic, adjacent to left ovary   -Pap smear 11/10/2021: NILM  -pelvic MRI 12/15/2021:  Exophytic uterine fibroid 4 cm between uterine fundus and left ovary, stable going back to 2019  -developed right upper extremity lymphedema post axillary lymph node dissection; used lymphedema sleeve and machine  -03/23/2022:  Right breast reconstruction revision with excision of fat necrosis and fat grafting to right breast  -DEXA scan 07/07/2020: Normal BMD but decreased  -pelvic MRI 12/09/2022:  Stable uterine fibroids (exophytic mass towards the fundus up to 3.5 cm, stable; 1.5 cm myometrial mass, stable)  -pelvic ultrasound 07/26/2023:  Stable uterine fibroids  >>>  Metachronous hormone receptor positive invasive lobular carcinoma of left breast:  -mammogram/ultrasound 03/13/2024:  Left breast BI-RADS 4: Left upper outer breast 7 mm enhancing focal asymmetry, lesion of concern   -stereotactic biopsy left upper outer breast 7 mm lesion 03/22/2024:  Invasive lobular carcinoma, largest focus 2.1 mm   -ER 25%; ME 0%; HER2 score 0; Ki-67 low proliferation (1%)  -liquid biopsy 04/15/2024: Negative for any relevant findings  >>>  -per SOFT and TEXT  trials, optimal duration of ovarian suppression therapy is 5 years (07/28/2020-07/2025); no efficacy or safety data to support prolonged ovarian function suppression  -premenopausal patient wishing to continue adjuvant endocrine therapy after ovarian function suppression is stopped, should use tamoxifen  -menopausal status can not be determined while receiving OFS  -check FSH and serum estradiol prior to next dose of Lupron for ovarian function assessment  -Currently, patient is on adjuvant Lupron and Arimidex; has failed; discontinue Arimidex  At appropriate time, we will start tamoxifen for adjuvant endocrine therapy along with ongoing ovarian function suppression with Lupron  -now, has developed metachronous invasive lobular carcinoma of left breast, 7 mm on imaging  -stage with contrast-enhanced CT scans of C/A/P and whole-body nuclear medicine bone scan rule out distant metastases  -Refer to surgery for resection of new left breast cancer  -comprehensive germline and somatic profiling to identify candidate for targeted therapies, is recommended  -in terms of genetic testing, genetic testing (12/02/2019)  was negative in the past  -have ordered somatic testing on left breast biopsy  -liquid biopsy 04/15/2024: Negative for any relevant findings  -given tumor characteristics, no indication of neoadjuvant chemotherapy  -recommend upfront resection of ILC left breast after ruling out metastatic disease  -subsequently, adjuvant therapy to depend upon surgical pathology  -modality of adjuvant endocrine therapy will depend upon assessment of ovarian function with serum FSH and estradiol level prior to next dose of Lupron  -repeat DEXA scan in 2 years (07/2022)    Taxol induced peripheral neuropathy  -gabapentin started 02/19/2020; Cymbalta started 03/18/2020    Premenopausal as of 11/11/2019 by history  -as of 07/09/2020, no menstrual cycles since December 2019 post neoadjuvant chemotherapy    -family history of breast  cancer and uterine cancers   -genetic testing negative (12/02/2019)    Follow-up in 2 weeks with results of all investigations; please do not schedule the patient without results of requested investigations     Above discussed at length with the patient.  All questions answered.    Discussed labs, scans, pathology report, and gave her copies of relevant records.    Development of metastatic invasive lobular carcinoma of left breast discussed.    Plan of management discussed in detail.  She understands and agrees with this plan.       Follow-up:  No follow-ups on file.

## 2024-05-09 NOTE — PROGRESS NOTES
Went and spoke with patient about scans ordered by Dr. Fraser. Patient stated that she is scheduled to have scans completed in [place] on 05/21/24.patient scheduled for ct CAP and whole body nuclear medicine bone scan. Advised patient to go down to [facility] to speak to them about financial assistance/insurance. Will check back around week of 21 to schedule patient with Dr. Fraser to review scans along with other investigations.

## 2024-05-28 ENCOUNTER — HOSPITAL ENCOUNTER (OUTPATIENT)
Dept: RADIOLOGY | Facility: HOSPITAL | Age: 49
Discharge: HOME | End: 2024-05-28
Attending: INTERNAL MEDICINE

## 2024-05-28 DIAGNOSIS — C50.912 LOBULAR CARCINOMA OF LEFT BREAST: ICD-10-CM

## 2024-05-28 DIAGNOSIS — D25.9 UTERINE LEIOMYOMA, UNSPECIFIED LOCATION: ICD-10-CM

## 2024-05-28 DIAGNOSIS — C50.911 INVASIVE DUCTAL CARCINOMA OF RIGHT BREAST IN FEMALE: ICD-10-CM

## 2024-05-28 PROCEDURE — 77080 DXA BONE DENSITY AXIAL: CPT | Mod: TC

## 2024-06-06 ENCOUNTER — TELEPHONE (OUTPATIENT)
Dept: HEMATOLOGY/ONCOLOGY | Facility: CLINIC | Age: 49
End: 2024-06-06

## 2024-06-06 PROBLEM — Z17.0 CARCINOMA OF LEFT BREAST IN FEMALE, ESTROGEN RECEPTOR POSITIVE: Status: ACTIVE | Noted: 2024-06-06

## 2024-06-06 PROBLEM — C50.912 CARCINOMA OF LEFT BREAST IN FEMALE, ESTROGEN RECEPTOR POSITIVE: Status: ACTIVE | Noted: 2024-06-06

## 2024-06-07 ENCOUNTER — OFFICE VISIT (OUTPATIENT)
Dept: HEMATOLOGY/ONCOLOGY | Facility: CLINIC | Age: 49
End: 2024-06-07
Attending: INTERNAL MEDICINE

## 2024-06-07 ENCOUNTER — INFUSION (OUTPATIENT)
Dept: INFUSION THERAPY | Facility: HOSPITAL | Age: 49
End: 2024-06-07
Attending: INTERNAL MEDICINE

## 2024-06-07 VITALS
TEMPERATURE: 98 F | HEART RATE: 80 BPM | OXYGEN SATURATION: 97 % | WEIGHT: 187.63 LBS | SYSTOLIC BLOOD PRESSURE: 124 MMHG | HEIGHT: 65 IN | DIASTOLIC BLOOD PRESSURE: 90 MMHG | RESPIRATION RATE: 20 BRPM | BODY MASS INDEX: 31.26 KG/M2

## 2024-06-07 VITALS
RESPIRATION RATE: 18 BRPM | WEIGHT: 187.81 LBS | HEIGHT: 66 IN | BODY MASS INDEX: 30.18 KG/M2 | OXYGEN SATURATION: 99 % | TEMPERATURE: 98 F | SYSTOLIC BLOOD PRESSURE: 123 MMHG | HEART RATE: 91 BPM | DIASTOLIC BLOOD PRESSURE: 86 MMHG

## 2024-06-07 DIAGNOSIS — C50.912 LOBULAR CARCINOMA OF LEFT BREAST: ICD-10-CM

## 2024-06-07 DIAGNOSIS — T45.1X5A CHEMOTHERAPY-INDUCED NEUROPATHY: Primary | ICD-10-CM

## 2024-06-07 DIAGNOSIS — D25.9 UTERINE LEIOMYOMA, UNSPECIFIED LOCATION: ICD-10-CM

## 2024-06-07 DIAGNOSIS — N95.9 PREMENOPAUSAL PATIENT: ICD-10-CM

## 2024-06-07 DIAGNOSIS — Z17.0 CARCINOMA OF LEFT BREAST IN FEMALE, ESTROGEN RECEPTOR POSITIVE, UNSPECIFIED SITE OF BREAST: ICD-10-CM

## 2024-06-07 DIAGNOSIS — Z80.49 FAMILY HISTORY OF UTERINE CANCER: ICD-10-CM

## 2024-06-07 DIAGNOSIS — C50.912 CARCINOMA OF LEFT BREAST IN FEMALE, ESTROGEN RECEPTOR POSITIVE, UNSPECIFIED SITE OF BREAST: ICD-10-CM

## 2024-06-07 DIAGNOSIS — C50.912 LOBULAR CARCINOMA OF LEFT BREAST: Primary | ICD-10-CM

## 2024-06-07 DIAGNOSIS — Z80.3 FAMILY HISTORY OF BREAST CANCER: ICD-10-CM

## 2024-06-07 DIAGNOSIS — G62.0 CHEMOTHERAPY-INDUCED NEUROPATHY: Primary | ICD-10-CM

## 2024-06-07 DIAGNOSIS — Z85.3 HISTORY OF RIGHT BREAST CANCER: ICD-10-CM

## 2024-06-07 DIAGNOSIS — Z90.11 H/O RIGHT MASTECTOMY: ICD-10-CM

## 2024-06-07 DIAGNOSIS — C50.919 MALIGNANT NEOPLASM OF FEMALE BREAST, UNSPECIFIED ESTROGEN RECEPTOR STATUS, UNSPECIFIED LATERALITY, UNSPECIFIED SITE OF BREAST: Primary | ICD-10-CM

## 2024-06-07 PROCEDURE — 96402 CHEMO HORMON ANTINEOPL SQ/IM: CPT

## 2024-06-07 PROCEDURE — 99214 OFFICE O/P EST MOD 30 MIN: CPT | Mod: PBBFAC,25 | Performed by: INTERNAL MEDICINE

## 2024-06-07 PROCEDURE — 99214 OFFICE O/P EST MOD 30 MIN: CPT | Mod: S$PBB,,, | Performed by: INTERNAL MEDICINE

## 2024-06-07 PROCEDURE — 63600175 PHARM REV CODE 636 W HCPCS: Mod: JZ,JG | Performed by: INTERNAL MEDICINE

## 2024-06-07 RX ADMIN — LEUPROLIDE ACETATE 3.75 MG: KIT at 09:06

## 2024-06-07 NOTE — PROGRESS NOTES
History:  Past Medical History:   Diagnosis Date    Breast cancer     HTN (hypertension)    Past medical history: Hypertension. Obesity. Tubal ligation in 1999.  -Right breast at 6:00, FNA (05/26/2016): Cyst with apocrine metaplasia    Social history: Single. Lives in [place], [place]. Has 2 children. She is an  at a nursing home. Denies history of tobacco, alcohol, or illicit drug abuse.    Family history: Maternal grandmother experienced breast cancer twice, in her 50s, and then in her 80s. One maternal cousin experienced uterine cancer in her 40s. Another maternal cousin experienced uterine cancer in her 50s. Mother's maternal uncle experienced some kind of cancer in the 70s.    Health maintenance:  ThinPrep cervical Pap smear (07/25/2016: Negative for intraepithelial lesion or malignancy.  Bilateral diagnostic mammogram 05/15/2017, was BI-RADS Category 2, benign.    Menstrual and OB/GYN history: Menarche at age 12. First childbirth at age 18. No history of abortions or miscarriages. Used birth control pills many years back. Currently, as of 11/11/2019, regular and normal menstrual periods.    Past Surgical History:   Procedure Laterality Date    AUGMENTATION OF BREAST Left     BREAST SURGERY Right     tram flap of rt breast    MASTECTOMY Right     TUBAL LIGATION        Social History     Socioeconomic History    Marital status: Single   Tobacco Use    Smoking status: Never    Smokeless tobacco: Never   Substance and Sexual Activity    Alcohol use: Not Currently     Comment: occassionally    Drug use: Never    Sexual activity: Not Currently     Partners: Male     Social Determinants of Health     Financial Resource Strain: Low Risk  (3/30/2023)    Overall Financial Resource Strain (CARDIA)     Difficulty of Paying Living Expenses: Not very hard   Food Insecurity: No Food Insecurity (3/30/2023)    Hunger Vital Sign     Worried About Running Out of Food in the Last Year: Never true     Ran  Out of Food in the Last Year: Never true   Transportation Needs: No Transportation Needs (3/30/2023)    PRAPARE - Transportation     Lack of Transportation (Medical): No     Lack of Transportation (Non-Medical): No   Physical Activity: Inactive (3/30/2023)    Exercise Vital Sign     Days of Exercise per Week: 0 days     Minutes of Exercise per Session: 0 min   Stress: Stress Concern Present (3/30/2023)    [employer] [place] [employer] - Occupational Stress Questionnaire     Feeling of Stress : To some extent   Housing Stability: Low Risk  (3/30/2023)    Housing Stability Vital Sign     Unable to Pay for Housing in the Last Year: No     Number of Places Lived in the Last Year: 1     Unstable Housing in the Last Year: No      Family History   Problem Relation Name Age of Onset    Hypertension Mother      No Known Problems Father        Reason for Follow-up:  -metachronous hormone receptor positive invasive lobular carcinoma of left breast, S/P biopsy 03/22/2024   -history of IDC right breast, inflammatory carcinoma, cT4d cN1 M0, AJCC anatomic stage IIIB, clinical prognostic stage IIIB, S/P biopsy 10/01/2019, ER 93.2%, WI 90.3%, HER2 negative, Ki 6715-3%, S/P neoadjuvant therapy, followed by simple right mastectomy and right axillary lymph node dissection 05/27/2020, ypT2 ypN2a, 2.0 cm residual tumor, 3 lymph nodes with macrometastases, 2 lymph nodes with micrometastasis, no definite response to neoadjuvant chemotherapy, then adjuvant radiotherapy and adjuvant endocrine therapy  -Taxol induced peripheral neuropathy  -premenopausal  -family history of breast cancer and uterine cancer  -uterine fibroids    History of Present Illness:   Infiltrating ductal carcinoma of breast, right      Oncologic/Hematologic History:  Oncology History   Invasive ductal carcinoma of right breast in female   10/1/2019 Cancer Staged    Staging form: Breast, AJCC 8th Edition  - Clinical stage from 10/1/2019: Stage IIIB (cT4d, cN1,  cM0, G2, ER+, MA+, HER2-)     5/27/2020 Cancer Staged    Staging form: Breast, AJCC 8th Edition  - Pathologic stage from 5/27/2020: ypT2, pN2a, cM0, G1, ER+, MA+, HER2-     4/13/2024 Initial Diagnosis    Invasive ductal carcinoma of right breast in female     44-year-old female referred by Dr. Stephanie Silva for breast cancer.    Patient with history of inflammatory carcinoma right breast, S/P neoadjuvant chemotherapy, right simple mastectomy, right axillary lymph node dissection, adjuvant radiotherapy, adjuvant endocrine therapy, subsequently developing metachronous invasive lobular carcinoma of left breast.    Please see assessment and plan section for details    1/11/2019:  Presents for initial oncology consultation, accompanied by her mother and daughter. Overall, feels quite well except for some fatigue. Has experienced some right upper extremity numbness, pain, and stinging sensation for last 1 month as a result of this, she is preferring left arm for some activities, like typing, etc. Appetite is good. No unintentional weight loss. No unusual headaches, focal neuro symptoms, chest pain, cough, dyspnea, fevers, chills, abdominal pain, nausea, vomiting, GI bleeding, bone pains, any urinary problems, etc. ECOG 0.    Interval History:  [No matching plan found]   [No matching plan found]     06/07/2024:   -NGS testing on left breast biopsy performed 03/22/2024:  No genetic abnormalities detected for RNA gene fusions; nondiagnostic study (unable to amplified DNA); positive PD-L1 expression for Keytruda based on CPS 5; no PD-L1 expression based on TPS <1%  -05/21/2024: CT chest with contrast (staging of breast cancer):  No focal mass or concerning lesion. No definitive evidence of intrathoracic malignancy.   Linear consolidation vertically spanning the anterior aspect of the right lung compatible with post radiation treatment changes.   -05/21/2024: CT A/P with IV contrast (staging of breast cancer):  (comparison  CTA abdomen pelvis 02/19/2021):  1.   No evidence of metastatic disease in the abdomen or pelvis.   2.   Stable appearance of nonspecific L5 and S1 sclerotic lesions as above.   3.   Multiple nonobstructing renal stones measuring 2-6 mm in bilateral kidneys as detailed above.   4.   Unchanged 2.8 cm left uterine fundal mass, likely representing a pedunculated fibroid.   5.   Mild hepatic steatosis.   -05/28/2024:  Whole-body nuclear medicine bone scan (staging of breast cancer):  Possibility of falcine calcification or meningioma anterior aspect of skull (head CT recommended for further evaluation); no metastases  -05/28/2024: DEXA scan (comparison:  07/13/2023):  Normal BMD  -05/29/2024: CT head without contrast (evaluation of abnormal bone scan):  No acute intracranial abnormality; no aggressive appearing lytic or sclerotic osseous lesion  -planning surgery, with simultaneous plastic surgery, with Remi Laughlin MD, [facility]  Presents for a follow-up visit.  Overall, stable.  Some fatigue.  Having some flu-like symptoms for last couple of weeks.  No bart fevers or chills.  No dyspnea.  No hemoptysis.  Appetite is fair.  Tells me that she is going to undergo breast surgery in [place] on July 2nd.  She says that lumpectomy is performed, followed immediately by reconstruction.      Medications:  Current Outpatient Medications on File Prior to Visit   Medication Sig Dispense Refill    DULoxetine (CYMBALTA) 60 MG capsule Take 1 capsule (60 mg total) by mouth every evening. 30 capsule 4    gabapentin (NEURONTIN) 400 MG capsule Take 1 capsule (400 mg total) by mouth 3 (three) times daily. 120 capsule 3    hydrOXYzine HCL (ATARAX) 10 MG Tab Take 10 mg by mouth.      lisinopriL-hydrochlorothiazide (PRINZIDE,ZESTORETIC) 10-12.5 mg per tablet Take 1 tablet by mouth once daily. 90 tablet 3    ondansetron (ZOFRAN) 4 MG tablet TAKE 1 TABLET BY MOUTH EVERY 8 HOURS AS NEEDED FOR NAUSEA 30 tablet 3     pantoprazole (PROTONIX) 40 MG tablet Take 1 tablet (40 mg total) by mouth once daily. 30 tablet 3    vitamin D (VITAMIN D3) 1000 units Tab Take 1 tablet (1,000 Units total) by mouth once daily. 60 tablet 3    calcium carbonate (OS-MARK) 600 mg calcium (1,500 mg) Tab Take 600 mg by mouth. (Patient not taking: Reported on 4/18/2024)      docusate sodium (COLACE) 100 MG capsule Take 1 capsule (100 mg total) by mouth 2 (two) times daily as needed for Constipation. (Patient not taking: Reported on 4/18/2024) 90 capsule 1    tamoxifen (NOLVADEX) 10 MG Tab tamoxifen Take No date recorded No form recorded No frequency recorded No route recorded No set duration recorded No set duration amount recorded active No dosage strength recorded No dosage strength units of measure recorded (Patient not taking: Reported on 4/18/2024.)       No current facility-administered medications on file prior to visit.       Review of Systems:   All systems reviewed and found to be negative except for the symptoms detailed above    Physical Examination:   VITAL SIGNS:   Vitals:    06/07/24 0841   BP: 123/86   Pulse: 91   Resp: 18   Temp: 98.3 °F (36.8 °C)         GENERAL:  In no apparent distress.    HEAD:  No signs of head trauma.  EYES:  Pupils are equal.  Extraocular motions intact.    EARS:  Hearing grossly intact.  MOUTH:  Oropharynx is normal.   NECK:  No adenopathy, no JVD.     CHEST:  Chest with clear breath sounds bilaterally.  No wheezes, rales, rhonchi.    CARDIAC:  Regular rate and rhythm.  S1 and S2, without murmurs, gallops, rubs.  VASCULAR:  No Edema.  Peripheral pulses normal and equal in all extremities.  ABDOMEN:  Soft, without detectable tenderness.  No sign of distention.  No   rebound or guarding, and no masses palpated.   Bowel Sounds normal.  MUSCULOSKELETAL:  Good range of motion of all major joints. Extremities without clubbing, cyanosis or edema.    NEUROLOGIC EXAM:  Alert and oriented x 3.  No focal sensory or strength  deficits.   Speech normal.  Follows commands.  PSYCHIATRIC:  Mood normal.  11/11/2019: Bilateral breast examination was performed with her verbal consent, in the presence of her mother and daughter, and the presence of Prenella, LPN; right breast is large, with orange peel appearance (erythema, erythematous and dimpled skin) involving <1/3 of the skin in the periareolar, 6:00, 3:00, and 9:00 positions; no nipple discharge; no palpable regional lymphadenopathy; left breast free from any suspicious lesions/masses or regional lymphadenopathy. No palpable hepatomegaly. Lungs clear to auscultation.  11/25/2019: Breast examination performed with her verbal consent, in the presence of Prenella, LPN; right breast is diffusely enlarged, approximately 13 cm x 12 cm enlargement in the lower quadrants; orange peel appearance of skin is noted along with some erythema, involving 3 o'clock position, 6 o'clock position, 9 o'clock position, and periareolar area, definitely approximately 50% of the skin of the breast.  02/05/2020: Right breast examined with her verbal consent, in the presence of nursing staff, and with her verbal consent: Right breast is much smaller and much softer without discretely palpable lump; no skin retraction, skin ulceration, fixation, erythema, or orange peel appearance; no nipple retraction; no palpable lymphadenopathy in axillary, infraclavicular, supraclavicular, or cervical areas.  03/18/2020: Right breast examined with her verbal consent, in the presence of Prenella, LPN; right breast is completely soft, without discrete masses, but still with some orange peel appearance of the skin in the lower half but without erythema; no palpable regional lymphadenopathy.  04/16/2020: Breast examination performed with her verbal consent, in the presence of Prenella, LPN; right breast is completely soft, without discrete masses; a hint of orange peel appearance of the skin in the lower half of the breast, with a  slight hint of erythema; no ulceration or satellite nodules; no fixation to chest wall; no palpable lymphadenopathy in axillary, supraclavicular, or infraclavicular areas.  04/30/2020: Breast examination performed with her verbal consent, in the presence of regular, LPN; right breast remains a supple, without discrete mass, with subtle darkening of skin in the lower half, without skin ulceration/satellitosis/fixation of breast tissue or nipple discharge; no lymphadenopathy palpable in right axilla; no tenderness in the right axilla. No supraclavicular or infraclavicular lymphadenopathy, either.    Results for orders placed or performed in visit on 04/15/24   CBC Auto Differential    Narrative    The following orders were created for panel order CBC Auto Differential.  Procedure                               Abnormality         Status                     ---------                               -----------         ------                     CBC with Differential[[id number]]                                                        Please view results for these tests on the individual orders.   Results for orders placed or performed in visit on 01/29/24   CBC with Differential   Result Value Ref Range    WBC 6.06 4.50 - 11.50 x10(3)/mcL    RBC 4.25 4.20 - 5.40 x10(6)/mcL    Hgb 13.3 12.0 - 16.0 g/dL    Hct 39.7 37.0 - 47.0 %    MCV 93.4 80.0 - 94.0 fL    MCH 31.3 (H) 27.0 - 31.0 pg    MCHC 33.5 33.0 - 36.0 g/dL    RDW 12.0 11.5 - 17.0 %    Platelet 281 130 - 400 x10(3)/mcL    MPV 9.9 7.4 - 10.4 fL    Neut % 54.7 %    Lymph % 36.8 %    Mono % 5.0 %    Eos % 2.3 %    Basophil % 0.5 %    Lymph # 2.23 0.6 - 4.6 x10(3)/mcL    Neut # 3.32 2.1 - 9.2 x10(3)/mcL    Mono # 0.30 0.1 - 1.3 x10(3)/mcL    Eos # 0.14 0 - 0.9 x10(3)/mcL    Baso # 0.03 <=0.2 x10(3)/mcL    IG# 0.04 0 - 0.04 x10(3)/mcL    IG% 0.7 %    NRBC% 0.0 %     Results for orders placed or performed in visit on 01/29/24   Comprehensive Metabolic Panel   Result Value  Ref Range    Sodium Level 143 136 - 145 mmol/L    Potassium Level 3.7 3.5 - 5.1 mmol/L    Chloride 110 (H) 98 - 107 mmol/L    Carbon Dioxide 24 22 - 29 mmol/L    Glucose Level 142 (H) 74 - 100 mg/dL    Blood Urea Nitrogen 12.5 7.0 - 18.7 mg/dL    Creatinine 0.78 0.55 - 1.02 mg/dL    Calcium Level Total 9.2 8.4 - 10.2 mg/dL    Protein Total 8.1 6.4 - 8.3 gm/dL    Albumin Level 3.7 3.5 - 5.0 g/dL    Globulin 4.4 (H) 2.4 - 3.5 gm/dL    Albumin/Globulin Ratio 0.8 (L) 1.1 - 2.0 ratio    Bilirubin Total 0.4 <=1.5 mg/dL    Alkaline Phosphatase 141 40 - 150 unit/L    Alanine Aminotransferase 20 0 - 55 unit/L    Aspartate Aminotransferase 21 5 - 34 unit/L    eGFR >60 mls/min/1.73/m2       Assessment:  Problem List Items Addressed This Visit          Neuro    Chemotherapy-induced neuropathy - Primary       Renal/    Uterine fibroid    Premenopausal patient       Oncology    Lobular carcinoma of left breast    H/O right mastectomy    Family history of breast cancer    Family history of uterine cancer    Carcinoma of left breast in female, estrogen receptor positive     Other Visit Diagnoses       History of right breast cancer              History of multifocal IDC right breast:  -at least 5 breast masses; multiple abnormal lymph nodes in axilla on mammogram and ultrasound 09/24/2019  -biopsy 10/01/2019: IDC, intermediate grade, at least 1.2 cm, along with DCIS; right axillary lymph node biopsy positive as well  -ER 93.2%; HI 90.3%; HER2 negative (IHC and FISH testing); Ki-67 15.3% (borderline positive)  -physical exam 11/11/2019:  Orange peel appearance of skin of right breast, involving <1/3 of the skin   -no palpable regional lymphadenopathy but right axillary lymph node biopsy positive on biopsy  -staging bone scan and CTs 11/2019: No metastases  -TTE 11/2019: LVEF 60%  -cT4b cN1 M0, AJCC anatomic stage IIIB, clinical prognostic stage stage IIIB  -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  -cT4d cN1 M0, AJCC anatomic  stage IIIB, clinical prognostic stage stage IIIB  (Biopsy positive axillary lymph node)  -ER 93.2%; IL 90.3%; HER2 negative (IHC and FISH testing); Ki-67 15.3% (borderline positive)  -Neoadjuvant DDAC x4 (12/13/2019-01/24/2020), clinically, with good response  -Neoadjuvant weekly Taxol x12 (02/07/2020-04/23/2020)   -05/27/2020: Simple right mastectomy, right axillary lymph node dissection:  Multiple foci of IDC, largest at least 2.0 cm, overall grade 1; DCIS also present; IDC margin 3.4 cm; DCIS margin 3.4 cm; 3 lymph nodes with macrometastases; 2 lymph nodes with micrometastasis; no definite response to neoadjuvant chemotherapy in IDC; LVI present  >>ypT2 ypN2a (2.0 cm tumor; 5 lymph nodes positive) (no definitive response to neoadjuvant chemotherapy)  ER positive (60%). IL positive (99%). HER-2 not overexpressed (score 0). Ki-67 low proliferation (2%)  -No metastasis (CT C/A/P, bone scan: 07/01/2020; brain MRI: 07/07/2020)  -Normal BMD (DEXA: 07/01/2020)  -S/P adjuvant radiotherapy to right mastectomy bed 06/02/2020-08/07/2020  -for adjuvant endocrine therapy, started on monthly Lupron shots 07/28/2020  -for adjuvant endocrine therapy, Arimidex started 09/20/2020  -03/24/2021:  Right BANG reconstruction of breast:  Right chest skin excision (no malignancy); MediPort removal   -07/14/2021:  2nd stage reconstruction: Left breast reduction, right nipple creation, fat grafting to right breast, and abdominal scar revision  -pelvic ultrasound 09/20/2021:  Uterine fibroid, 1 of them exophytic, adjacent to left ovary   -Pap smear 11/10/2021: NILM  -pelvic MRI 12/15/2021:  Exophytic uterine fibroid 4 cm between uterine fundus and left ovary, stable going back to 2019  -developed right upper extremity lymphedema post axillary lymph node dissection; used lymphedema sleeve and machine  -03/23/2022:  Right breast reconstruction revision with excision of fat necrosis and fat grafting to right breast  -DEXA scan 07/07/2020:  Normal BMD but decreased  -pelvic MRI 12/09/2022:  Stable uterine fibroids (exophytic mass towards the fundus up to 3.5 cm, stable; 1.5 cm myometrial mass, stable)  -pelvic ultrasound 07/26/2023:  Stable uterine fibroids  >>>  Metachronous hormone receptor positive invasive lobular carcinoma of left breast:  (failed adjuvant endocrine therapy with Lupron +Arimidex, started July/September 2020)  -mammogram/ultrasound 03/13/2024:  Left breast BI-RADS 4: Left upper outer breast 7 mm enhancing focal asymmetry, lesion of concern   -stereotactic biopsy left upper outer breast 7 mm lesion 03/22/2024:  Invasive lobular carcinoma, largest focus 2.1 mm   -ER 25%; ID 0%; HER2 score 0; Ki-67 low proliferation (1%)  -NGS testing on left breast biopsy performed 03/22/2024:  No genetic abnormalities detected for RNA gene fusions; nondiagnostic study (unable to amplified DNA); positive PD-L1 expression for Keytruda based on CPS 5; no PD-L1 expression based on TPS <1%  -CTs C/A/P 0 05/21/2024: No metastases  -bone scan 05/28/2024: No metastases  -DEXA scan 05/28/2024:  Normal BMD since 07/13/2023 DEXA scan  -CT head without contrast 05/29/2024:  No metastases; no meningioma      Molecular testing:  -genetic testing 12/02/2019:  Essentially negative  -liquid biopsy 04/15/2024: Negative for any relevant findings  -NGS testing on left breast biopsy performed 03/22/2024:  No genetic abnormalities detected for RNA gene fusions; nondiagnostic study (unable to amplified DNA); positive PD-L1 expression for Keytruda based on CPS 5; no PD-L1 expression based on TPS <1%      Taxol induced peripheral neuropathy:  -Gabapentin started 02/19/2020  -Cymbalta 60 mg p.o. nightly started 03/18/2020      Premenopausal as of 11/11/2019 by history:  -as of 07/09/2020, no menstrual cycles since December 2019 (after starting neoadjuvant chemotherapy)      Family history of breast cancer and uterine cancers  -12/02/2019: Genetic testing: Three (3) variants  of uncertain significance (VUS): AXIN2 c.1573C>G (p.Usg618Xtm), msh3 c.2005C>T (p.Jqg416Rfa), and POLE c.1007A>G (p.Pmu648Moz)      Exophytic fibroid versus left adnexal mass (4 cm, solid) on CT 11/22/2019:  -12/03/2019: Pelvic ultrasound: No discrete sonographic pathology  -pelvic ultrasound 09/20/2021:  Uterine fibroid, 1 of them exophytic, adjacent to left ovary   -Pap smear 11/10/2021: NILM  -pelvic MRI 12/15/2021:  Exophytic uterine fibroid 4 cm between uterine fundus and left ovary, stable going back to 2019  -pelvic MRI 12/09/2022:  Stable uterine fibroids (exophytic mass towards the fundus up to 3.5 cm, stable; 1.5 cm myometrial mass, stable)  -pelvic ultrasound 07/26/2023:  Stable uterine fibroids      Plan:  Check FSH and estradiol level prior to next dose of Lupron for ovarian function suppression  Discontinue Arimidex  Proceed with surgery in [place]  DEXA scan in May 2026  Continue gabapentin and Cymbalta for neuropathy  Follow-up mid July, after breast surgery, to plan adjuvant therapy  -----------------------------------------      -multifocal IDC right breast, at least 5 breast masses on ultrasound and mammogram, biopsy 10/01/2019  -inflammatory breast carcinoma on presentation, on physical exam 11/25/2019  -cT4d cN1 M0, AJCC anatomic stage IIIB, clinical prognostic stage IIIB  -ER 93.2%, NM 90.3%, HER2 negative, Ki 6715.3%   -S/P neoadjuvant ddAC x4, then weekly Taxol X 12 (12/13/2019-04/23/2020), clinically with good response   -S/P simple right mastectomy and right axillary lymph node dissection 05/27/2020:  No definite response to neoadjuvant chemotherapy  -ypT2 ypN2a (2.0 cm residual tumor; 5 lymph nodes positive including 3 lymph nodes with macrometastases and 2 lymph nodes with micrometastasis)  ER 60%, NM 99%, HER2 score 0, Ki-67 2%  -no metastases on CT C/A/P, bone scan 07/01/2020  -no metastases on brain MRI 07/07/2020   -DEXA scan 07/01/2020: Normal BMD  -S/P adjuvant radiotherapy to  right mastectomy bed 06/02/2020-08/07/2020  -for adjuvant endocrine therapy, started on monthly Lupron shots 07/28/2020  -for adjuvant endocrine therapy, Arimidex started 09/20/2020  -03/24/2021:  Right BANG reconstruction of breast:  Right chest skin excision (no malignancy); MediPort removal   -07/14/2021:  2nd stage reconstruction: Left breast reduction, right nipple creation, fat grafting to right breast, and abdominal scar revision  -pelvic ultrasound 09/20/2021:  Uterine fibroid, 1 of them exophytic, adjacent to left ovary   -Pap smear 11/10/2021: NILM  -pelvic MRI 12/15/2021:  Exophytic uterine fibroid 4 cm between uterine fundus and left ovary, stable going back to 2019  -developed right upper extremity lymphedema post axillary lymph node dissection; used lymphedema sleeve and machine  -03/23/2022:  Right breast reconstruction revision with excision of fat necrosis and fat grafting to right breast  -DEXA scan 07/07/2020: Normal BMD but decreased  -pelvic MRI 12/09/2022:  Stable uterine fibroids (exophytic mass towards the fundus up to 3.5 cm, stable; 1.5 cm myometrial mass, stable)  -pelvic ultrasound 07/26/2023:  Stable uterine fibroids  >>>  Metachronous hormone receptor positive invasive lobular carcinoma of left breast:  (failed adjuvant endocrine therapy with Lupron +Arimidex, started July/September 2020)  -mammogram/ultrasound 03/13/2024:  Left breast BI-RADS 4: Left upper outer breast 7 mm enhancing focal asymmetry, lesion of concern   -stereotactic biopsy left upper outer breast 7 mm lesion 03/22/2024:  Invasive lobular carcinoma, largest focus 2.1 mm   -ER 25%; TN 0%; HER2 score 0; Ki-67 low proliferation (1%)  -liquid biopsy 04/15/2024: Negative for any relevant findings  -NGS testing on left breast biopsy performed 03/22/2024:  No genetic abnormalities detected for RNA gene fusions; nondiagnostic study (unable to amplified DNA); positive PD-L1 expression for Keytruda based on CPS 5; no PD-L1  expression based on TPS <1%  -CTs C/A/P 0 05/21/2024: No metastases  -bone scan 05/28/2024: No metastases  -DEXA scan 05/28/2024:  Normal BMD since 07/13/2023 DEXA scan  -CT head without contrast 05/29/2024:  No metastases; no meningioma  >>>  -per SOFT and TEXT trials, optimal duration of ovarian suppression therapy is 5 years (07/28/2020-07/2025); no efficacy or safety data to support prolonged ovarian function suppression  -premenopausal patient wishing to continue adjuvant endocrine therapy after ovarian function suppression is stopped, should use tamoxifen  -menopausal status can not be determined while receiving OFS  -check FSH and serum estradiol prior to next dose of Lupron for ovarian function assessment  -Currently, patient is on adjuvant Lupron and Arimidex; has failed; discontinue Arimidex  At appropriate time, we will start tamoxifen for adjuvant endocrine therapy along with ongoing ovarian function suppression with Lupron  -now, has developed metachronous invasive lobular carcinoma of left breast, 7 mm on imaging  -no metastases  -planning to have surgery and Plastic surgery, done in [place]  -in terms of genetic testing, genetic testing (12/02/2019)  was negative in the past  -somatic testing (NGS testing) on left breast biopsy performed 03/22/2024, negative for RNA gene fusions; nondiagnostic (unable to amplify DNA)  -liquid biopsy 04/15/2024: Negative for any relevant findings  -given tumor characteristics, no indication of neoadjuvant chemotherapy  -recommend upfront resection of ILC left breast after ruling out metastatic disease; being planned in [place]  -subsequently, adjuvant therapy to depend upon surgical pathology  -modality of adjuvant endocrine therapy will depend upon assessment of ovarian function with serum FSH and estradiol level prior to next dose of Lupron  -repeat DEXA scan in 2 years (May 2026)    Taxol induced peripheral neuropathy  -gabapentin started 02/19/2020;  Cymbalta started 03/18/2020    Premenopausal as of 11/11/2019 by history  -as of 07/09/2020, no menstrual cycles since December 2019 post neoadjuvant chemotherapy    -family history of breast cancer and uterine cancers   -genetic testing negative (12/02/2019)    Follow-up mid July, after breast surgery, to plan adjuvant therapy    Above discussed at length with the patient.  All questions answered.    Discussed labs and scans and gave her copies of relevant records.  Plan of management discussed in detail.  She understands and agrees with this plan.       Follow-up:  No follow-ups on file.

## 2024-06-07 NOTE — NURSING
930 Arrive from Oncology clinic provider visit for C27 D1 Lupron injection (LGM) UPT not needed has hx of BTL. C/O of generalizes pain level 4/10 takes home medications as needed for relief.

## 2024-06-07 NOTE — Clinical Note
Check FSH and estradiol level prior to next dose of Lupron for ovarian function suppression Discontinue Arimidex Proceed with surgery in [place] DEXA scan in May 2026 Continue gabapentin and Cymbalta for neuropathy Follow-up in 1 month, after breast surgery, to plan adjuvant therapy

## 2024-06-17 NOTE — PROGRESS NOTES
[facility] INTERNAL MEDICINE  OUTPATIENT OFFICE VISIT NOTE       Chief Complaint: Medication Refill and Follow-up (Patient states she has been having a persistent cough x 3 weeks with yellow mucus.)       HPI: Jeni Mejia is a 48 y.o. yo female with  has a past medical history of Breast cancer and HTN (hypertension)., who presents for  follow-up appointment . Patient reports having issues with sinus congestion and productive cough that has been going on for the past 3 weeks. Patient denies any fevers, chills, SOB, chest pain, or headaches during this time. During this time, patient also reports having new right leg pain/numbness. She reports this has been going on for a few days now. She denies any swelling, long trips, or weakness. Of note, patient is currently scheduled for breast surgery in [place] for her breast cancer. Otherwise, patient says she is doing well and hoping to improve with her treatments/surgeries.       Past Medical History:   has a past medical history of Breast cancer and HTN (hypertension).     Past Surgical History:   has a past surgical history that includes Mastectomy (Right); Augmentation of breast (Left); Breast surgery (Right); and Tubal ligation.     Family History:  family history includes Hypertension in her mother; No Known Problems in her father.     Social History:   reports that she has never smoked. She has never used smokeless tobacco. She reports that she does not currently use alcohol. She reports that she does not use drugs.     Allergies:  is allergic to sulfa (sulfonamide antibiotics) and sulfamethoxazole-trimethoprim.     Home Medications:  Prior to Admission medications    Medication Sig Start Date End Date Taking? Authorizing Provider   anastrozole (ARIMIDEX) 1 mg Tab Take 1 tablet (1 mg total) by mouth once daily. 9/7/23  Yes Kasia Jerry, ABHIJEET   calcium 26-vit D3-magnesium 15 167 mg calcium- 1.67 mcg-83 mg Cap   Calcium 1200mg plus Vitamin D3 25mcg, See  Instructions, take one daily, 0 Refill(s) 2/7/22  Yes Provider, Historical   calcium carbonate (OS-MARK) 600 mg calcium (1,500 mg) Tab Take 600 mg by mouth.   Yes Provider, Historical   meloxicam (MOBIC) 7.5 MG tablet Take 1 tablet (7.5 mg total) by mouth once daily. Take 1 tablet as needed for pain daily 3/30/23  Yes Jorge Shell MD   ondansetron (ZOFRAN) 4 MG tablet TAKE 1 TABLET BY MOUTH EVERY 8 HOURS AS NEEDED FOR NAUSEA 3/30/23  Yes Jorge Shell MD   tamoxifen (NOLVADEX) 10 MG Tab tamoxifen Take No date recorded No form recorded No frequency recorded No route recorded No set duration recorded No set duration amount recorded active No dosage strength recorded No dosage strength units of measure recorded   Yes Provider, Historical   vitamin D (VITAMIN D3) 1000 units Tab Take 1,000 Units by mouth once daily. 7/1/22  Yes Provider, Historical   docusate sodium (COLACE) 100 MG capsule Take 1 capsule (100 mg total) by mouth 2 (two) times daily as needed for Constipation. 4/18/23 9/11/23 Yes Jorge Shell MD   DULoxetine (CYMBALTA) 60 MG capsule Take 1 capsule (60 mg total) by mouth every evening. 8/18/22 9/11/23 Yes Kasia Jerry, ABHIJEET   gabapentin (NEURONTIN) 400 MG capsule Take 400 mg by mouth. 2/7/22 9/11/23 Yes Provider, Historical   lisinopriL-hydrochlorothiazide (PRINZIDE,ZESTORETIC) 10-12.5 mg per tablet Take by mouth. 2/7/22 9/11/23 Yes Provider, Historical   docusate sodium (COLACE) 100 MG capsule Take 1 capsule (100 mg total) by mouth 2 (two) times daily as needed for Constipation. 9/11/23   Jorge Shell MD   DULoxetine (CYMBALTA) 60 MG capsule Take 1 capsule (60 mg total) by mouth every evening. 9/11/23   Jorge Shell MD   gabapentin (NEURONTIN) 400 MG capsule Take 1 capsule (400 mg total) by mouth 3 (three) times daily. 9/11/23   Jorge Shell MD   hydrOXYzine HCL (ATARAX) 10 MG Tab Take 10 mg by mouth. 8/10/23   Provider, Historical  "  lisinopriL-hydrochlorothiazide (PRINZIDE,ZESTORETIC) 10-12.5 mg per tablet Take 1 tablet by mouth once daily. 9/11/23 9/5/24  Jorge Shell MD       ROS:  Constitutional: no fever, fatigue, weakness  Eye: no vision loss, eye redness, drainage, or pain  ENMT: no sore throat, ear pain, sinus pain/congestion, nasal congestion/drainage  Respiratory: no cough, no wheezing, no shortness of breath  Cardiovascular: no chest pain, no palpitations, no edema  Gastrointestinal: no nausea, vomiting, or diarrhea. No abdominal pain  Genitourinary: no dysuria, no urinary frequency or urgency, no hematuria  Hema/Lymph: no abnormal bruising or bleeding  Endocrine: no heat or cold intolerance, no excessive thirst or excessive urination  Musculoskeletal: no muscle or joint pain, no joint swelling  Integumentary: no skin rash or abnormal lesion  Neurologic: no headache, no dizziness, no weakness or numbness    OBJECTIVE:     Vital signs:   /87 (BP Location: Left arm, Patient Position: Sitting, BP Method: Medium (Automatic))   Pulse 83   Temp 98.6 °F (37 °C) (Oral)   Resp 18   Ht 5' 5" (1.651 m)   Wt 86.5 kg (190 lb 12.8 oz)   SpO2 100%   BMI 31.75 kg/m²      Physical Examination:  Physical Exam  HENT:      Head: Normocephalic and atraumatic.      Mouth/Throat:      Mouth: Mucous membranes are moist.      Pharynx: Oropharynx is clear.   Eyes:      Conjunctiva/sclera: Conjunctivae normal.      Pupils: Pupils are equal, round, and reactive to light.   Cardiovascular:      Rate and Rhythm: Normal rate and regular rhythm.      Pulses: Normal pulses.      Heart sounds: No murmur heard.  Pulmonary:      Effort: Pulmonary effort is normal. No respiratory distress.      Breath sounds: No wheezing.   Chest:      Chest wall: No tenderness.   Abdominal:      General: Abdomen is flat. Bowel sounds are normal. There is no distension.      Palpations: Abdomen is soft.      Tenderness: There is no abdominal tenderness. "   Musculoskeletal:         General: No swelling. Normal range of motion.      Cervical back: Normal range of motion.   Skin:     General: Skin is warm.   Neurological:      General: No focal deficit present.      Mental Status: She is alert and oriented to person, place, and time.          Labs:  Lab Results   Component Value Date    WBC 6.52 06/18/2024    HGB 12.5 06/18/2024    HCT 37.6 06/18/2024     06/18/2024    MCV 95.2 (H) 06/18/2024    RDW 12.4 06/18/2024    Lab Results   Component Value Date     06/18/2024    K 3.6 06/18/2024     06/18/2024    CO2 31 (H) 06/18/2024    BUN 9.9 06/18/2024    CREATININE 0.79 06/18/2024    CALCIUM 9.5 06/18/2024    MG 1.90 04/15/2024      Lab Results   Component Value Date    HGBA1C 5.7 04/27/2022    .9 04/27/2022    CREATININE 0.79 06/18/2024    CREATRANDUR 242.1 (H) 10/12/2021    PROTEINURINE 14.4 10/12/2021    Lab Results   Component Value Date    TSH 1.134 07/26/2023    HKTVRK4EQII 0.99 07/26/2023                  ASSESSMENT & PLAN:     Breast cancer-Stage IIIB (cT4b cN1 M0) Multifocal Infiltrating Ductal Carcinoma of the  Right Breast; s/p reconstructive surgery  -ER positive (93.2%), MD positive (90.3%), HER-2 negative, Ki-67: 15.3% (borderline)  -Lupron monthly (started July 28, 2020) and Arimidex  -S/p right mastectomy and lymph node dissection May 2020; last chemo April 2020, last radiation August 2020. S/p liposuction and breast augmentation (2021) in [place]  -Patient being seen by Dr. Fraser in [facility] hematology/oncology clinic  -Currently on Arimidex, Calcium and Vitamin D  -Now with left upper breast pathology showing invasive lobular carcinoma   -Schedule mastectomy with [place] on 7/2/24  -Will continue to follow    URI  -Reports 3 week history of congestion and increased sputum production that is green/yellow in color  -Suspect due to possible prolonged viral course due to low severity of symptoms and immunocompromised  state  -Will order sputum culture to assess for bacterial cause  -Will consider starting antibiotic if no improvement or if sputum growth     Uterine fibroids  -Reports lower abdominal pain going on for months; does not have reported bleeding  -Transvaginal ultrasound ordered and showed lesion in the uterus most probably related to fibroids; one of them exophytic. Exophytic fibroid appeared to be adjacent to the left ovary.  -MRI pelvis showed appearence of fibroid uterus  -follows with Gynecology, started on over-the-counter vaginal moisturizers     Hypertension  -Well controlled; 138/87 today  -Continue HCTZ 12.5 mg-lisinopril 10 mg daily   -Continue to monitor, refill medications at this time    Health Maintenance   Topic Date Due    Colorectal Cancer Screening  04/11/2026    TETANUS VACCINE  12/07/2026    Lipid Panel  04/27/2027    Hepatitis C Screening  Completed        Health Maintenance/ Wellness  Pneumococcal vaccine: #13 (3/5/2020)  TDAP: Up-to-date 2016  Influenza vaccine: UTD 11/22  Shingrix vaccine: Not applicable; performed at age 50  Screening colonoscopy: Cologuard negative  Pap smear: UTD 2021  Mammogram: S/P MASTECTOMY      Labs ordered: CBC, CMP, and sputum culture  Imaging: N/A  Medications: reconciled, discussed and refills given.  RTC in 3 months      Jorge Shell MD  [facility], -2

## 2024-06-18 ENCOUNTER — OFFICE VISIT (OUTPATIENT)
Dept: INTERNAL MEDICINE | Facility: CLINIC | Age: 49
End: 2024-06-18

## 2024-06-18 ENCOUNTER — LAB VISIT (OUTPATIENT)
Dept: LAB | Facility: HOSPITAL | Age: 49
End: 2024-06-18

## 2024-06-18 VITALS
DIASTOLIC BLOOD PRESSURE: 87 MMHG | HEIGHT: 65 IN | WEIGHT: 190.81 LBS | BODY MASS INDEX: 31.79 KG/M2 | RESPIRATION RATE: 18 BRPM | SYSTOLIC BLOOD PRESSURE: 138 MMHG | HEART RATE: 83 BPM | OXYGEN SATURATION: 100 % | TEMPERATURE: 99 F

## 2024-06-18 DIAGNOSIS — C50.911 INFILTRATING DUCTAL CARCINOMA OF BREAST, RIGHT: Primary | ICD-10-CM

## 2024-06-18 DIAGNOSIS — I10 HYPERTENSION, UNSPECIFIED TYPE: ICD-10-CM

## 2024-06-18 DIAGNOSIS — R05.8 PRODUCTIVE COUGH: ICD-10-CM

## 2024-06-18 DIAGNOSIS — M79.604 LOWER EXTREMITY PAIN, RIGHT: ICD-10-CM

## 2024-06-18 LAB
ALBUMIN SERPL-MCNC: 3.3 G/DL (ref 3.5–5)
ALBUMIN/GLOB SERPL: 0.7 RATIO (ref 1.1–2)
ALP SERPL-CCNC: 125 UNIT/L (ref 40–150)
ALT SERPL-CCNC: 22 UNIT/L (ref 0–55)
ANION GAP SERPL CALC-SCNC: 4 MEQ/L
AST SERPL-CCNC: 25 UNIT/L (ref 5–34)
BASOPHILS # BLD AUTO: 0.02 X10(3)/MCL
BASOPHILS NFR BLD AUTO: 0.3 %
BILIRUB SERPL-MCNC: 0.5 MG/DL
BUN SERPL-MCNC: 9.9 MG/DL (ref 7–18.7)
CALCIUM SERPL-MCNC: 9.5 MG/DL (ref 8.4–10.2)
CHLORIDE SERPL-SCNC: 106 MMOL/L (ref 98–107)
CO2 SERPL-SCNC: 31 MMOL/L (ref 22–29)
CREAT SERPL-MCNC: 0.79 MG/DL (ref 0.55–1.02)
CREAT/UREA NIT SERPL: 13
EOSINOPHIL # BLD AUTO: 0.15 X10(3)/MCL (ref 0–0.9)
EOSINOPHIL NFR BLD AUTO: 2.3 %
ERYTHROCYTE [DISTWIDTH] IN BLOOD BY AUTOMATED COUNT: 12.4 % (ref 11.5–17)
GFR SERPLBLD CREATININE-BSD FMLA CKD-EPI: >60 ML/MIN/1.73/M2
GLOBULIN SER-MCNC: 4.8 GM/DL (ref 2.4–3.5)
GLUCOSE SERPL-MCNC: 94 MG/DL (ref 74–100)
HCT VFR BLD AUTO: 37.6 % (ref 37–47)
HGB BLD-MCNC: 12.5 G/DL (ref 12–16)
IMM GRANULOCYTES # BLD AUTO: 0.02 X10(3)/MCL (ref 0–0.04)
IMM GRANULOCYTES NFR BLD AUTO: 0.3 %
LYMPHOCYTES # BLD AUTO: 2.66 X10(3)/MCL (ref 0.6–4.6)
LYMPHOCYTES NFR BLD AUTO: 40.8 %
MCH RBC QN AUTO: 31.6 PG (ref 27–31)
MCHC RBC AUTO-ENTMCNC: 33.2 G/DL (ref 33–36)
MCV RBC AUTO: 95.2 FL (ref 80–94)
MONOCYTES # BLD AUTO: 0.55 X10(3)/MCL (ref 0.1–1.3)
MONOCYTES NFR BLD AUTO: 8.4 %
NEUTROPHILS # BLD AUTO: 3.12 X10(3)/MCL (ref 2.1–9.2)
NEUTROPHILS NFR BLD AUTO: 47.9 %
NRBC BLD AUTO-RTO: 0 %
PLATELET # BLD AUTO: 299 X10(3)/MCL (ref 130–400)
PMV BLD AUTO: 9.8 FL (ref 7.4–10.4)
POTASSIUM SERPL-SCNC: 3.6 MMOL/L (ref 3.5–5.1)
PROT SERPL-MCNC: 8.1 GM/DL (ref 6.4–8.3)
RBC # BLD AUTO: 3.95 X10(6)/MCL (ref 4.2–5.4)
SODIUM SERPL-SCNC: 141 MMOL/L (ref 136–145)
WBC # BLD AUTO: 6.52 X10(3)/MCL (ref 4.5–11.5)

## 2024-06-18 PROCEDURE — 85025 COMPLETE CBC W/AUTO DIFF WBC: CPT

## 2024-06-18 PROCEDURE — 99214 OFFICE O/P EST MOD 30 MIN: CPT | Mod: PBBFAC

## 2024-06-18 PROCEDURE — 80053 COMPREHEN METABOLIC PANEL: CPT

## 2024-06-18 PROCEDURE — 36415 COLL VENOUS BLD VENIPUNCTURE: CPT

## 2024-07-15 ENCOUNTER — LAB VISIT (OUTPATIENT)
Dept: HEMATOLOGY/ONCOLOGY | Facility: CLINIC | Age: 49
End: 2024-07-15

## 2024-07-15 ENCOUNTER — INFUSION (OUTPATIENT)
Dept: INFUSION THERAPY | Facility: HOSPITAL | Age: 49
End: 2024-07-15
Attending: INTERNAL MEDICINE

## 2024-07-15 VITALS
SYSTOLIC BLOOD PRESSURE: 168 MMHG | BODY MASS INDEX: 32.4 KG/M2 | DIASTOLIC BLOOD PRESSURE: 111 MMHG | TEMPERATURE: 98 F | RESPIRATION RATE: 18 BRPM | WEIGHT: 194.5 LBS | HEIGHT: 65 IN | OXYGEN SATURATION: 98 % | HEART RATE: 80 BPM

## 2024-07-15 DIAGNOSIS — Z17.0 CARCINOMA OF LEFT BREAST IN FEMALE, ESTROGEN RECEPTOR POSITIVE, UNSPECIFIED SITE OF BREAST: ICD-10-CM

## 2024-07-15 DIAGNOSIS — C50.919 MALIGNANT NEOPLASM OF FEMALE BREAST, UNSPECIFIED ESTROGEN RECEPTOR STATUS, UNSPECIFIED LATERALITY, UNSPECIFIED SITE OF BREAST: Primary | ICD-10-CM

## 2024-07-15 DIAGNOSIS — C50.912 LOBULAR CARCINOMA OF LEFT BREAST: ICD-10-CM

## 2024-07-15 DIAGNOSIS — C50.912 CARCINOMA OF LEFT BREAST IN FEMALE, ESTROGEN RECEPTOR POSITIVE, UNSPECIFIED SITE OF BREAST: ICD-10-CM

## 2024-07-15 LAB
ESTRADIOL SERPL HS-MCNC: <24 PG/ML
FSH SERPL-ACNC: 3.06 MIU/ML

## 2024-07-15 PROCEDURE — 83001 ASSAY OF GONADOTROPIN (FSH): CPT

## 2024-07-15 PROCEDURE — 63600175 PHARM REV CODE 636 W HCPCS: Mod: JZ,JG | Performed by: NURSE PRACTITIONER

## 2024-07-15 PROCEDURE — 82670 ASSAY OF TOTAL ESTRADIOL: CPT

## 2024-07-15 PROCEDURE — 36415 COLL VENOUS BLD VENIPUNCTURE: CPT

## 2024-07-15 PROCEDURE — 96402 CHEMO HORMON ANTINEOPL SQ/IM: CPT

## 2024-07-15 RX ADMIN — LEUPROLIDE ACETATE 3.75 MG: KIT at 11:07

## 2024-07-19 ENCOUNTER — TELEPHONE (OUTPATIENT)
Dept: HEMATOLOGY/ONCOLOGY | Facility: CLINIC | Age: 49
End: 2024-07-19

## 2024-07-21 NOTE — PROGRESS NOTES
History:  Past Medical History:   Diagnosis Date    Breast cancer     HTN (hypertension)    Past medical history: Hypertension. Obesity. Tubal ligation in 1999.  -Right breast at 6:00, FNA (05/26/2016): Cyst with apocrine metaplasia    Social history: Single. Lives in [place], [place]. Has 2 children. She is an  at a nursing home. Denies history of tobacco, alcohol, or illicit drug abuse.    Family history: Maternal grandmother experienced breast cancer twice, in her 50s, and then in her 80s. One maternal cousin experienced uterine cancer in her 40s. Another maternal cousin experienced uterine cancer in her 50s. Mother's maternal uncle experienced some kind of cancer in the 70s.    Health maintenance:  ThinPrep cervical Pap smear (07/25/2016: Negative for intraepithelial lesion or malignancy.  Bilateral diagnostic mammogram 05/15/2017, was BI-RADS Category 2, benign.    Menstrual and OB/GYN history: Menarche at age 12. First childbirth at age 18. No history of abortions or miscarriages. Used birth control pills many years back. Currently, as of 11/11/2019, regular and normal menstrual periods.    Past Surgical History:   Procedure Laterality Date    AUGMENTATION OF BREAST Left     BREAST SURGERY Right     tram flap of rt breast    MASTECTOMY Right     TUBAL LIGATION        Social History     Socioeconomic History    Marital status: Single   Tobacco Use    Smoking status: Never    Smokeless tobacco: Never   Substance and Sexual Activity    Alcohol use: Not Currently     Comment: occassionally    Drug use: Never    Sexual activity: Not Currently     Partners: Male     Social Determinants of Health     Financial Resource Strain: Low Risk  (3/30/2023)    Overall Financial Resource Strain (CARDIA)     Difficulty of Paying Living Expenses: Not very hard   Food Insecurity: No Food Insecurity (3/30/2023)    Hunger Vital Sign     Worried About Running Out of Food in the Last Year: Never true     Ran  Out of Food in the Last Year: Never true   Transportation Needs: No Transportation Needs (3/30/2023)    PRAPARE - Transportation     Lack of Transportation (Medical): No     Lack of Transportation (Non-Medical): No   Physical Activity: Inactive (3/30/2023)    Exercise Vital Sign     Days of Exercise per Week: 0 days     Minutes of Exercise per Session: 0 min   Stress: Stress Concern Present (3/30/2023)    [employer] - Occupational Stress Questionnaire     Feeling of Stress : To some extent   Housing Stability: Low Risk  (3/30/2023)    Housing Stability Vital Sign     Unable to Pay for Housing in the Last Year: No     Number of Places Lived in the Last Year: 1     Unstable Housing in the Last Year: No      Family History   Problem Relation Name Age of Onset    Hypertension Mother      No Known Problems Father        Reason for Follow-up:  -metachronous hormone receptor positive invasive lobular carcinoma of left breast, S/P biopsy 03/22/2024; S/P left mastectomy 07/02/2024; pT1b pN0 (sn), grade 2, ER positive, KS negative, HER2 negative, AJCC anatomic stage IA, pathological prognostic stage I A  -history of IDC right breast, inflammatory carcinoma, cT4d cN1 M0, AJCC anatomic stage IIIB, clinical prognostic stage IIIB, S/P biopsy 10/01/2019, ER 93.2%, KS 90.3%, HER2 negative, Ki 6715-3%, S/P neoadjuvant therapy, followed by simple right mastectomy and right axillary lymph node dissection 05/27/2020, ypT2 ypN2a, 2.0 cm residual tumor, 3 lymph nodes with macrometastases, 2 lymph nodes with micrometastasis, no definite response to neoadjuvant chemotherapy, then adjuvant radiotherapy and adjuvant endocrine therapy  -Taxol induced peripheral neuropathy  -premenopausal  -family history of breast cancer and uterine cancer  -uterine fibroids    History of Present Illness:   Infiltrating ductal carcinoma of breast, right      Oncologic/Hematologic History:  Oncology History   Lobular carcinoma of  left breast   4/13/2024 Initial Diagnosis    Lobular carcinoma of left breast     7/2/2024 Cancer Staged    Staging form: Breast, AJCC 8th Edition  - Pathologic stage from 7/2/2024: Stage IA (pT1b, pN0, cM0, G2, ER+, MT-, HER2-)     Invasive ductal carcinoma of right breast in female   10/1/2019 Cancer Staged    Staging form: Breast, AJCC 8th Edition  - Clinical stage from 10/1/2019: Stage IIIB (cT4d, cN1, cM0, G2, ER+, MT+, HER2-)     5/27/2020 Cancer Staged    Staging form: Breast, AJCC 8th Edition  - Pathologic stage from 5/27/2020: ypT2, pN2a, cM0, G1, ER+, MT+, HER2-     4/13/2024 Initial Diagnosis    Invasive ductal carcinoma of right breast in female     44-year-old female referred by Dr. Stephanie Silva for breast cancer.    Patient with history of inflammatory carcinoma right breast, S/P neoadjuvant chemotherapy, right simple mastectomy, right axillary lymph node dissection, adjuvant radiotherapy, adjuvant endocrine therapy, subsequently developing metachronous invasive lobular carcinoma of left breast.    Please see assessment and plan section for details    1/11/2019:  Presents for initial oncology consultation, accompanied by her mother and daughter. Overall, feels quite well except for some fatigue. Has experienced some right upper extremity numbness, pain, and stinging sensation for last 1 month as a result of this, she is preferring left arm for some activities, like typing, etc. Appetite is good. No unintentional weight loss. No unusual headaches, focal neuro symptoms, chest pain, cough, dyspnea, fevers, chills, abdominal pain, nausea, vomiting, GI bleeding, bone pains, any urinary problems, etc. ECOG 0.    Interval History:  [No matching plan found]   [No matching plan found]     07/22/2024:   -06/26/2024:  CTA runoff abdomen/pelvis/bilateral lower extremities:  No acute findings   -07/02/2024:  ([facility]; Remi Laughlin MD):  Left breast mastectomy with a tissue expander placement and  left SLN biopsy: Invasive lobular carcinoma, 9 mm; margins negative; upper outer quadrant; overall grade 2; no DCIS; no LCIS; no LVI; all margins negative for invasive carcinoma (distance from invasive carcinoma to closest margin, 4 mm); # of lymph nodes examined, 1; # of SLN examined, 1; all regional lymph nodes negative for tumor (ER positive, OH negative, HER2 negative)  >>> pT1b pN0 (sn), grade 2, ER positive, OH negative, HER2 negative, AJCC anatomic stage IA, pathological prognostic stage I A  -07/15/2024:  11:24 a.m.:  Before monthly Lupron shot:  Estradiol <24 pg/mL; FSH 3.06 mIU /ml (premenopausal)  -07/15/2024:  Monthly Lupron given at 11:41 a.m.  Presents for a follow-up visit.  In no acute discomfort.  Has postmastectomy drain in place.  No fevers or chills.  Has follow-up with plastic surgeon in [place], in the next few days.  No symptoms of acute concern.      Medications:  Current Outpatient Medications on File Prior to Visit   Medication Sig Dispense Refill    calcium carbonate (OS-MARK) 600 mg calcium (1,500 mg) Tab Take 600 mg by mouth.      DULoxetine (CYMBALTA) 60 MG capsule Take 1 capsule (60 mg total) by mouth every evening. 30 capsule 4    gabapentin (NEURONTIN) 400 MG capsule Take 1 capsule (400 mg total) by mouth 3 (three) times daily. 120 capsule 3    hydrOXYzine HCL (ATARAX) 10 MG Tab Take 10 mg by mouth.      ondansetron (ZOFRAN) 4 MG tablet TAKE 1 TABLET BY MOUTH EVERY 8 HOURS AS NEEDED FOR NAUSEA 30 tablet 3    pantoprazole (PROTONIX) 40 MG tablet Take 1 tablet (40 mg total) by mouth once daily. 30 tablet 3    vitamin D (VITAMIN D3) 1000 units Tab Take 1 tablet (1,000 Units total) by mouth once daily. 60 tablet 3    docusate sodium (COLACE) 100 MG capsule Take 1 capsule (100 mg total) by mouth 2 (two) times daily as needed for Constipation. (Patient not taking: Reported on 4/18/2024) 90 capsule 1    lisinopriL-hydrochlorothiazide (PRINZIDE,ZESTORETIC) 10-12.5 mg per tablet Take 1  tablet by mouth once daily. (Patient not taking: Reported on 6/18/2024) 90 tablet 3    tamoxifen (NOLVADEX) 10 MG Tab tamoxifen Take No date recorded No form recorded No frequency recorded No route recorded No set duration recorded No set duration amount recorded active No dosage strength recorded No dosage strength units of measure recorded (Patient not taking: Reported on 4/18/2024.)       No current facility-administered medications on file prior to visit.       Review of Systems:   All systems reviewed and found to be negative except for the symptoms detailed above    Physical Examination:   VITAL SIGNS:   Vitals:    07/22/24 0945   BP: (!) 157/98   Pulse:    Resp:    Temp:        GENERAL:  In no apparent distress.    HEAD:  No signs of head trauma.  EYES:  Pupils are equal.  Extraocular motions intact.    EARS:  Hearing grossly intact.  MOUTH:  Oropharynx is normal.   NECK:  No adenopathy, no JVD.     CHEST:  Chest with clear breath sounds bilaterally.  No wheezes, rales, rhonchi.    CARDIAC:  Regular rate and rhythm.  S1 and S2, without murmurs, gallops, rubs.  VASCULAR:  No Edema.  Peripheral pulses normal and equal in all extremities.  ABDOMEN:  Soft, without detectable tenderness.  No sign of distention.  No   rebound or guarding, and no masses palpated.   Bowel Sounds normal.  MUSCULOSKELETAL:  Good range of motion of all major joints. Extremities without clubbing, cyanosis or edema.    NEUROLOGIC EXAM:  Alert and oriented x 3.  No focal sensory or strength deficits.   Speech normal.  Follows commands.  PSYCHIATRIC:  Mood normal.  11/11/2019: Bilateral breast examination was performed with her verbal consent, in the presence of her mother and daughter, and the presence of Adam, LPN; right breast is large, with orange peel appearance (erythema, erythematous and dimpled skin) involving <1/3 of the skin in the periareolar, 6:00, 3:00, and 9:00 positions; no nipple discharge; no palpable regional  lymphadenopathy; left breast free from any suspicious lesions/masses or regional lymphadenopathy. No palpable hepatomegaly. Lungs clear to auscultation.  11/25/2019: Breast examination performed with her verbal consent, in the presence of Prenella, LPN; right breast is diffusely enlarged, approximately 13 cm x 12 cm enlargement in the lower quadrants; orange peel appearance of skin is noted along with some erythema, involving 3 o'clock position, 6 o'clock position, 9 o'clock position, and periareolar area, definitely approximately 50% of the skin of the breast.  02/05/2020: Right breast examined with her verbal consent, in the presence of nursing staff, and with her verbal consent: Right breast is much smaller and much softer without discretely palpable lump; no skin retraction, skin ulceration, fixation, erythema, or orange peel appearance; no nipple retraction; no palpable lymphadenopathy in axillary, infraclavicular, supraclavicular, or cervical areas.  03/18/2020: Right breast examined with her verbal consent, in the presence of Prenella, LPN; right breast is completely soft, without discrete masses, but still with some orange peel appearance of the skin in the lower half but without erythema; no palpable regional lymphadenopathy.  04/16/2020: Breast examination performed with her verbal consent, in the presence of Prenella, LPN; right breast is completely soft, without discrete masses; a hint of orange peel appearance of the skin in the lower half of the breast, with a slight hint of erythema; no ulceration or satellite nodules; no fixation to chest wall; no palpable lymphadenopathy in axillary, supraclavicular, or infraclavicular areas.  04/30/2020: Breast examination performed with her verbal consent, in the presence of regular, LPN; right breast remains a supple, without discrete mass, with subtle darkening of skin in the lower half, without skin ulceration/satellitosis/fixation of breast tissue or nipple  discharge; no lymphadenopathy palpable in right axilla; no tenderness in the right axilla. No supraclavicular or infraclavicular lymphadenopathy, either.    Results for orders placed or performed in visit on 04/15/24   CBC Auto Differential    Narrative    The following orders were created for panel order CBC Auto Differential.  Procedure                               Abnormality         Status                     ---------                               -----------         ------                     CBC with Differential[[id number]]                                                        Please view results for these tests on the individual orders.   Results for orders placed or performed in visit on 01/29/24   CBC with Differential   Result Value Ref Range    WBC 6.06 4.50 - 11.50 x10(3)/mcL    RBC 4.25 4.20 - 5.40 x10(6)/mcL    Hgb 13.3 12.0 - 16.0 g/dL    Hct 39.7 37.0 - 47.0 %    MCV 93.4 80.0 - 94.0 fL    MCH 31.3 (H) 27.0 - 31.0 pg    MCHC 33.5 33.0 - 36.0 g/dL    RDW 12.0 11.5 - 17.0 %    Platelet 281 130 - 400 x10(3)/mcL    MPV 9.9 7.4 - 10.4 fL    Neut % 54.7 %    Lymph % 36.8 %    Mono % 5.0 %    Eos % 2.3 %    Basophil % 0.5 %    Lymph # 2.23 0.6 - 4.6 x10(3)/mcL    Neut # 3.32 2.1 - 9.2 x10(3)/mcL    Mono # 0.30 0.1 - 1.3 x10(3)/mcL    Eos # 0.14 0 - 0.9 x10(3)/mcL    Baso # 0.03 <=0.2 x10(3)/mcL    IG# 0.04 0 - 0.04 x10(3)/mcL    IG% 0.7 %    NRBC% 0.0 %     Results for orders placed or performed in visit on 01/29/24   Comprehensive Metabolic Panel   Result Value Ref Range    Sodium Level 143 136 - 145 mmol/L    Potassium Level 3.7 3.5 - 5.1 mmol/L    Chloride 110 (H) 98 - 107 mmol/L    Carbon Dioxide 24 22 - 29 mmol/L    Glucose Level 142 (H) 74 - 100 mg/dL    Blood Urea Nitrogen 12.5 7.0 - 18.7 mg/dL    Creatinine 0.78 0.55 - 1.02 mg/dL    Calcium Level Total 9.2 8.4 - 10.2 mg/dL    Protein Total 8.1 6.4 - 8.3 gm/dL    Albumin Level 3.7 3.5 - 5.0 g/dL    Globulin 4.4 (H) 2.4 - 3.5 gm/dL     Albumin/Globulin Ratio 0.8 (L) 1.1 - 2.0 ratio    Bilirubin Total 0.4 <=1.5 mg/dL    Alkaline Phosphatase 141 40 - 150 unit/L    Alanine Aminotransferase 20 0 - 55 unit/L    Aspartate Aminotransferase 21 5 - 34 unit/L    eGFR >60 mls/min/1.73/m2       Assessment:  Problem List Items Addressed This Visit          Neuro    Chemotherapy-induced neuropathy - Primary       Renal/    Uterine fibroid    Premenopausal patient       Oncology    Lobular carcinoma of left breast    Invasive ductal carcinoma of right breast in female    H/O right mastectomy    Family history of breast cancer    Family history of uterine cancer    Carcinoma of left breast in female, estrogen receptor positive       History of multifocal IDC right breast:  -at least 5 breast masses; multiple abnormal lymph nodes in axilla on mammogram and ultrasound 09/24/2019  -biopsy 10/01/2019: IDC, intermediate grade, at least 1.2 cm, along with DCIS; right axillary lymph node biopsy positive as well  -ER 93.2%; TX 90.3%; HER2 negative (IHC and FISH testing); Ki-67 15.3% (borderline positive)  -physical exam 11/11/2019:  Orange peel appearance of skin of right breast, involving <1/3 of the skin   -no palpable regional lymphadenopathy but right axillary lymph node biopsy positive on biopsy  -staging bone scan and CTs 11/2019: No metastases  -TTE 11/2019: LVEF 60%  -cT4b cN1 M0, AJCC anatomic stage IIIB, clinical prognostic stage stage IIIB  -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  -cT4d cN1 M0, AJCC anatomic stage IIIB, clinical prognostic stage stage IIIB  (Biopsy positive axillary lymph node)  -ER 93.2%; TX 90.3%; HER2 negative (IHC and FISH testing); Ki-67 15.3% (borderline positive)  -Neoadjuvant DDAC x4 (12/13/2019-01/24/2020), clinically, with good response  -Neoadjuvant weekly Taxol x12 (02/07/2020-04/23/2020)   -05/27/2020: Simple right mastectomy, right axillary lymph node dissection:  Multiple foci of IDC, largest at least 2.0 cm, overall grade  1; DCIS also present; IDC margin 3.4 cm; DCIS margin 3.4 cm; 3 lymph nodes with macrometastases; 2 lymph nodes with micrometastasis; no definite response to neoadjuvant chemotherapy in IDC; LVI present  >>ypT2 ypN2a (2.0 cm tumor; 5 lymph nodes positive) (no definitive response to neoadjuvant chemotherapy)  ER positive (60%). CT positive (99%). HER-2 not overexpressed (score 0). Ki-67 low proliferation (2%)  -No metastasis (CT C/A/P, bone scan: 07/01/2020; brain MRI: 07/07/2020)  -Normal BMD (DEXA: 07/01/2020)  -S/P adjuvant radiotherapy to right mastectomy bed 06/02/2020-08/07/2020  -for adjuvant endocrine therapy, started on monthly Lupron shots 07/28/2020  -for adjuvant endocrine therapy, Arimidex started 09/20/2020  -03/24/2021:  Right BANG reconstruction of breast:  Right chest skin excision (no malignancy); MediPort removal   -07/14/2021:  2nd stage reconstruction: Left breast reduction, right nipple creation, fat grafting to right breast, and abdominal scar revision  -pelvic ultrasound 09/20/2021:  Uterine fibroid, 1 of them exophytic, adjacent to left ovary   -Pap smear 11/10/2021: NILM  -pelvic MRI 12/15/2021:  Exophytic uterine fibroid 4 cm between uterine fundus and left ovary, stable going back to 2019  -developed right upper extremity lymphedema post axillary lymph node dissection; used lymphedema sleeve and machine  -03/23/2022:  Right breast reconstruction revision with excision of fat necrosis and fat grafting to right breast  -DEXA scan 07/07/2020: Normal BMD but decreased  -pelvic MRI 12/09/2022:  Stable uterine fibroids (exophytic mass towards the fundus up to 3.5 cm, stable; 1.5 cm myometrial mass, stable)  -pelvic ultrasound 07/26/2023:  Stable uterine fibroids  >>>  Metachronous hormone receptor positive invasive lobular carcinoma of left breast:  (failed adjuvant endocrine therapy with Lupron +Arimidex, which was started July/September 2020)  -mammogram/ultrasound 03/13/2024:  Left breast  BI-RADS 4: Left upper outer breast 7 mm enhancing focal asymmetry, lesion of concern   -stereotactic biopsy left upper outer breast 7 mm lesion 03/22/2024:  Invasive lobular carcinoma, largest focus 2.1 mm   -ER 25%; ME 0%; HER2 score 0; Ki-67 low proliferation (1%)  -NGS testing on left breast biopsy performed 03/22/2024:  No genetic abnormalities detected for RNA gene fusions; nondiagnostic study (unable to amplified DNA); positive PD-L1 expression for Keytruda based on CPS 5; no PD-L1 expression based on TPS <1%  -CTs C/A/P 05/21/2024: No metastases  -bone scan 05/28/2024: No metastases  -DEXA scan 05/28/2024:  Normal BMD since 07/13/2023 DEXA scan  -CT head without contrast 05/29/2024:  No metastases; no meningioma  -07/02/2024:  ([facility]; Remi Laughlin MD):  Left breast mastectomy with a tissue expander placement and left SLN biopsy: Invasive lobular carcinoma, 9 mm; margins negative; upper outer quadrant; overall grade 2; no DCIS; no LCIS; no LVI; all margins negative for invasive carcinoma (distance from invasive carcinoma to closest margin, 4 mm); # of lymph nodes examined, 1; # of SLN examined, 1; all regional lymph nodes negative for tumor (ER positive, ME negative, HER2 negative)  >>> pT1b pN0 (sn), grade 2, ER positive, ME negative, HER2 negative, AJCC anatomic stage IA, pathological prognostic stage I A  -07/15/2024:  11:24 a.m.:  Before monthly Lupron shot:  Estradiol <24 pg/mL; FSH 3.06 mIU /ml (premenopausal)  -07/15/2024:  Monthly Lupron given at 11:41 a.m.      Molecular testing:  -genetic testing 12/02/2019:  Essentially negative  -liquid biopsy 04/15/2024: Negative for any relevant findings  -NGS testing on left breast biopsy performed 03/22/2024:  No genetic abnormalities detected for RNA gene fusions; nondiagnostic study (unable to amplified DNA); positive PD-L1 expression for Keytruda based on CPS 5; no PD-L1 expression based on TPS <1%      Taxol induced peripheral  neuropathy:  -Gabapentin started 02/19/2020  -Cymbalta 60 mg p.o. nightly started 03/18/2020      Premenopausal as of 11/11/2019 by history:  -as of 07/09/2020, no menstrual cycles since December 2019 (after starting neoadjuvant chemotherapy)      Family history of breast cancer and uterine cancers  -12/02/2019: Genetic testing: Three (3) variants of uncertain significance (VUS): AXIN2 c.1573C>G (p.[id number]), msh3 c.2005C>T (p.Nfq407Lbg), and POLE c.1007A>G (p.[id number])      Exophytic fibroid versus left adnexal mass (4 cm, solid) on CT 11/22/2019:  -12/03/2019: Pelvic ultrasound: No discrete sonographic pathology  -pelvic ultrasound 09/20/2021:  Uterine fibroid, 1 of them exophytic, adjacent to left ovary   -Pap smear 11/10/2021: NILM  -pelvic MRI 12/15/2021:  Exophytic uterine fibroid 4 cm between uterine fundus and left ovary, stable going back to 2019  -pelvic MRI 12/09/2022:  Stable uterine fibroids (exophytic mass towards the fundus up to 3.5 cm, stable; 1.5 cm myometrial mass, stable)  -pelvic ultrasound 07/26/2023:  Stable uterine fibroids      Plan:  Oncotype DX testing  Continue monthly Lupron shot  Make sure Arimidex has been discontinued  In October, check FSH and estradiol level before Lupron shot  DEXA scan in May 2026   Continue gabapentin and Cymbalt  Follow-up in 3 weeks, with results of Oncotype DX testing  ----------------------------------------------------------------------------------      -premenopausal patient  -S/P left mastectomy 07/02/2024  - pT1b pN0 (sn), grade 2, ER positive, UT negative, HER2 negative, AJCC anatomic stage IA, pathological prognostic stage I A  -07/15/2024:  11:24 a.m.:  Before monthly Lupron shot:  Estradiol <24 pg/mL; FSH 3.06 mIU /ml (remains premenopausal)  -07/15/2024:  Monthly Lupron given at 11:41 a.m.  -per SOFT and TEXT trials, optimal duration of ovarian suppression therapy is 5 years (07/28/2020-07/2025); no efficacy or safety data to support prolonged  ovarian function suppression  -premenopausal patients wishing to continue adjuvant endocrine therapy after ovarian function suppression is stopped, should use tamoxifen  -menopausal status can not be determined while receiving OFS  -check FSH and serum estradiol prior to next dose of Lupron for ovarian function assessment (premenopausal as of 07/15/2024)  -since she has failed adjuvant ovarian suppression therapy +Arimidex, therefore, we have discontinued Arimidex; we will switch to ovarian suppression therapy + tamoxifen (see above)  -in terms of genetic testing, genetic testing (12/02/2019)  was negative in the past  >>>  -will order Oncotype DX testing  -if recurrence score </= 15, then:  Adjuvant endocrine therapy +/- ovarian suppression/ablation (we will continue monthly Lupron for ovarian suppression + start tamoxifen)   -if recurrence score 16-25, then:  Adjuvant endocrine therapy +/- ovarian suppression/ablation (we will continue monthly Lupron for ovarian suppression +start tamoxifen)  -if recurrence score =/> 26, then:  Adjuvant chemotherapy followed by endocrine therapy +/- ovarian suppression/ablation  -if becomes postmenopausal after 5 years of adjuvant tamoxifen, then aromatase inhibitor for 5 years (category 1 option); or, continue tamoxifen for an additional 5 years to complete 10 years  -if remains premenopausal after 5 years of tamoxifen, then, consider tamoxifen for an additional 5 years complete 10 years, or no further endocrine therapy  -no indication of adjuvant radiotherapy  -continue monthly Lupron for ovarian suppression   -will add adjuvant tamoxifen at appropriate time  -ovarian function suppression (monthly Lupron) to stop 07/2025)  -Arimidex has been discontinued (failed adjuvant endocrine therapy)  -mid October, check FSH and estradiol level before next monthly shot of Lupron  -DEXA scan in 2 years (May 2026)    Taxol induced peripheral neuropathy  -gabapentin started 02/19/2020;  Cymbalta started 03/18/2020    Premenopausal as of 11/11/2019 by history  -as of 07/09/2020, no menstrual cycles since December 2019 post neoadjuvant chemotherapy  -07/15/2024:  11:24 a.m.:  Before monthly Lupron shot:  Estradiol <24 pg/mL; FSH 3.06 mIU /ml (remains premenopausal)    -family history of breast cancer and uterine cancers   -genetic testing negative (12/02/2019)    Follow-up in 3 weeks, with results of Oncotype DX testing    Above discussed at length with the patient.  All questions answered.    Discussed labs, scans, pathology report, and breast cancer staging, and gave her copies of relevant records.  Plan of management discussed in detail.  She understands and agrees with this plan.       Follow-up:  No follow-ups on file.

## 2024-07-22 ENCOUNTER — OFFICE VISIT (OUTPATIENT)
Dept: HEMATOLOGY/ONCOLOGY | Facility: CLINIC | Age: 49
End: 2024-07-22
Attending: INTERNAL MEDICINE

## 2024-07-22 VITALS
TEMPERATURE: 99 F | OXYGEN SATURATION: 98 % | BODY MASS INDEX: 32.35 KG/M2 | SYSTOLIC BLOOD PRESSURE: 157 MMHG | HEART RATE: 100 BPM | WEIGHT: 194.19 LBS | RESPIRATION RATE: 18 BRPM | DIASTOLIC BLOOD PRESSURE: 98 MMHG | HEIGHT: 65 IN

## 2024-07-22 DIAGNOSIS — C50.912 CARCINOMA OF LEFT BREAST IN FEMALE, ESTROGEN RECEPTOR POSITIVE, UNSPECIFIED SITE OF BREAST: Primary | ICD-10-CM

## 2024-07-22 DIAGNOSIS — C50.911 INFILTRATING DUCTAL CARCINOMA OF BREAST, RIGHT: ICD-10-CM

## 2024-07-22 DIAGNOSIS — C50.912 CARCINOMA OF LEFT BREAST IN FEMALE, ESTROGEN RECEPTOR POSITIVE, UNSPECIFIED SITE OF BREAST: ICD-10-CM

## 2024-07-22 DIAGNOSIS — Z80.49 FAMILY HISTORY OF UTERINE CANCER: ICD-10-CM

## 2024-07-22 DIAGNOSIS — C50.912 LOBULAR CARCINOMA OF LEFT BREAST: ICD-10-CM

## 2024-07-22 DIAGNOSIS — Z90.11 H/O RIGHT MASTECTOMY: ICD-10-CM

## 2024-07-22 DIAGNOSIS — Z17.0 CARCINOMA OF LEFT BREAST IN FEMALE, ESTROGEN RECEPTOR POSITIVE, UNSPECIFIED SITE OF BREAST: Primary | ICD-10-CM

## 2024-07-22 DIAGNOSIS — Z80.3 FAMILY HISTORY OF BREAST CANCER: ICD-10-CM

## 2024-07-22 DIAGNOSIS — G62.0 CHEMOTHERAPY-INDUCED NEUROPATHY: ICD-10-CM

## 2024-07-22 DIAGNOSIS — Z17.0 CARCINOMA OF LEFT BREAST IN FEMALE, ESTROGEN RECEPTOR POSITIVE, UNSPECIFIED SITE OF BREAST: ICD-10-CM

## 2024-07-22 DIAGNOSIS — G62.0 CHEMOTHERAPY-INDUCED NEUROPATHY: Primary | ICD-10-CM

## 2024-07-22 DIAGNOSIS — C50.911 INVASIVE DUCTAL CARCINOMA OF RIGHT BREAST IN FEMALE: ICD-10-CM

## 2024-07-22 DIAGNOSIS — D25.9 UTERINE LEIOMYOMA, UNSPECIFIED LOCATION: ICD-10-CM

## 2024-07-22 DIAGNOSIS — T45.1X5A CHEMOTHERAPY-INDUCED NEUROPATHY: Primary | ICD-10-CM

## 2024-07-22 DIAGNOSIS — N95.9 PREMENOPAUSAL PATIENT: ICD-10-CM

## 2024-07-22 DIAGNOSIS — R30.0 DYSURIA: ICD-10-CM

## 2024-07-22 DIAGNOSIS — T45.1X5A CHEMOTHERAPY-INDUCED NEUROPATHY: ICD-10-CM

## 2024-07-22 LAB
ALBUMIN SERPL-MCNC: 3.4 G/DL (ref 3.5–5)
ALBUMIN/GLOB SERPL: 0.7 RATIO (ref 1.1–2)
ALP SERPL-CCNC: 122 UNIT/L (ref 40–150)
ALT SERPL-CCNC: 12 UNIT/L (ref 0–55)
ANION GAP SERPL CALC-SCNC: 7 MEQ/L
AST SERPL-CCNC: 15 UNIT/L (ref 5–34)
BASOPHILS # BLD AUTO: 0.03 X10(3)/MCL
BASOPHILS NFR BLD AUTO: 0.5 %
BILIRUB SERPL-MCNC: 0.5 MG/DL
BUN SERPL-MCNC: 12.4 MG/DL (ref 7–18.7)
CALCIUM SERPL-MCNC: 9.6 MG/DL (ref 8.4–10.2)
CHLORIDE SERPL-SCNC: 105 MMOL/L (ref 98–107)
CO2 SERPL-SCNC: 28 MMOL/L (ref 22–29)
CREAT SERPL-MCNC: 0.75 MG/DL (ref 0.55–1.02)
CREAT/UREA NIT SERPL: 17
EOSINOPHIL # BLD AUTO: 0.18 X10(3)/MCL (ref 0–0.9)
EOSINOPHIL NFR BLD AUTO: 3.2 %
ERYTHROCYTE [DISTWIDTH] IN BLOOD BY AUTOMATED COUNT: 12.3 % (ref 11.5–17)
GFR SERPLBLD CREATININE-BSD FMLA CKD-EPI: >60 ML/MIN/1.73/M2
GLOBULIN SER-MCNC: 4.6 GM/DL (ref 2.4–3.5)
GLUCOSE SERPL-MCNC: 147 MG/DL (ref 74–100)
HCT VFR BLD AUTO: 38.4 % (ref 37–47)
HGB BLD-MCNC: 12.5 G/DL (ref 12–16)
IMM GRANULOCYTES # BLD AUTO: 0.02 X10(3)/MCL (ref 0–0.04)
IMM GRANULOCYTES NFR BLD AUTO: 0.4 %
LYMPHOCYTES # BLD AUTO: 2.53 X10(3)/MCL (ref 0.6–4.6)
LYMPHOCYTES NFR BLD AUTO: 45.6 %
MAGNESIUM SERPL-MCNC: 1.8 MG/DL (ref 1.6–2.6)
MCH RBC QN AUTO: 30.9 PG (ref 27–31)
MCHC RBC AUTO-ENTMCNC: 32.6 G/DL (ref 33–36)
MCV RBC AUTO: 95 FL (ref 80–94)
MONOCYTES # BLD AUTO: 0.32 X10(3)/MCL (ref 0.1–1.3)
MONOCYTES NFR BLD AUTO: 5.8 %
NEUTROPHILS # BLD AUTO: 2.47 X10(3)/MCL (ref 2.1–9.2)
NEUTROPHILS NFR BLD AUTO: 44.5 %
NRBC BLD AUTO-RTO: 0 %
PLATELET # BLD AUTO: 329 X10(3)/MCL (ref 130–400)
PMV BLD AUTO: 10.2 FL (ref 7.4–10.4)
POTASSIUM SERPL-SCNC: 3.5 MMOL/L (ref 3.5–5.1)
PROT SERPL-MCNC: 8 GM/DL (ref 6.4–8.3)
RBC # BLD AUTO: 4.04 X10(6)/MCL (ref 4.2–5.4)
SODIUM SERPL-SCNC: 140 MMOL/L (ref 136–145)
WBC # BLD AUTO: 5.55 X10(3)/MCL (ref 4.5–11.5)

## 2024-07-22 PROCEDURE — 85025 COMPLETE CBC W/AUTO DIFF WBC: CPT

## 2024-07-22 PROCEDURE — 99214 OFFICE O/P EST MOD 30 MIN: CPT | Mod: PBBFAC | Performed by: INTERNAL MEDICINE

## 2024-07-22 PROCEDURE — 83735 ASSAY OF MAGNESIUM: CPT

## 2024-07-22 PROCEDURE — 99214 OFFICE O/P EST MOD 30 MIN: CPT | Mod: S$PBB,,, | Performed by: INTERNAL MEDICINE

## 2024-07-22 PROCEDURE — 36415 COLL VENOUS BLD VENIPUNCTURE: CPT

## 2024-07-22 PROCEDURE — 80053 COMPREHEN METABOLIC PANEL: CPT

## 2024-07-22 NOTE — Clinical Note
Oncotype DX testing Continue monthly Lupron shot Make sure Arimidex has been discontinued In October, check FSH and estradiol level before Lupron shot DEXA scan in May 2026  Continue gabapentin and Cymbalt Follow-up in 3 weeks, with results of Oncotype DX testing

## 2024-08-14 ENCOUNTER — TELEPHONE (OUTPATIENT)
Dept: HEMATOLOGY/ONCOLOGY | Facility: CLINIC | Age: 49
End: 2024-08-14

## 2024-08-14 PROBLEM — Z90.12 HISTORY OF LEFT MASTECTOMY: Status: ACTIVE | Noted: 2024-08-14

## 2024-08-15 ENCOUNTER — OFFICE VISIT (OUTPATIENT)
Dept: HEMATOLOGY/ONCOLOGY | Facility: CLINIC | Age: 49
End: 2024-08-15
Attending: INTERNAL MEDICINE

## 2024-08-15 ENCOUNTER — INFUSION (OUTPATIENT)
Dept: INFUSION THERAPY | Facility: HOSPITAL | Age: 49
End: 2024-08-15
Attending: INTERNAL MEDICINE

## 2024-08-15 VITALS
HEIGHT: 65 IN | HEART RATE: 87 BPM | TEMPERATURE: 99 F | OXYGEN SATURATION: 98 % | RESPIRATION RATE: 18 BRPM | WEIGHT: 193.63 LBS | SYSTOLIC BLOOD PRESSURE: 146 MMHG | DIASTOLIC BLOOD PRESSURE: 88 MMHG | BODY MASS INDEX: 32.26 KG/M2

## 2024-08-15 VITALS
RESPIRATION RATE: 20 BRPM | TEMPERATURE: 98 F | SYSTOLIC BLOOD PRESSURE: 119 MMHG | HEART RATE: 88 BPM | DIASTOLIC BLOOD PRESSURE: 85 MMHG | OXYGEN SATURATION: 97 %

## 2024-08-15 DIAGNOSIS — Z80.3 FAMILY HISTORY OF BREAST CANCER: ICD-10-CM

## 2024-08-15 DIAGNOSIS — C50.912 LOBULAR CARCINOMA OF LEFT BREAST: ICD-10-CM

## 2024-08-15 DIAGNOSIS — R10.2 PELVIC PAIN: ICD-10-CM

## 2024-08-15 DIAGNOSIS — N95.9 PREMENOPAUSAL PATIENT: ICD-10-CM

## 2024-08-15 DIAGNOSIS — Z90.11 H/O RIGHT MASTECTOMY: ICD-10-CM

## 2024-08-15 DIAGNOSIS — Z17.0 CARCINOMA OF LEFT BREAST IN FEMALE, ESTROGEN RECEPTOR POSITIVE, UNSPECIFIED SITE OF BREAST: ICD-10-CM

## 2024-08-15 DIAGNOSIS — D25.9 UTERINE LEIOMYOMA, UNSPECIFIED LOCATION: ICD-10-CM

## 2024-08-15 DIAGNOSIS — G62.0 CHEMOTHERAPY-INDUCED NEUROPATHY: Primary | ICD-10-CM

## 2024-08-15 DIAGNOSIS — Z80.49 FAMILY HISTORY OF UTERINE CANCER: ICD-10-CM

## 2024-08-15 DIAGNOSIS — C50.911 INVASIVE DUCTAL CARCINOMA OF RIGHT BREAST IN FEMALE: ICD-10-CM

## 2024-08-15 DIAGNOSIS — Z90.12 HISTORY OF LEFT MASTECTOMY: ICD-10-CM

## 2024-08-15 DIAGNOSIS — C50.912 CARCINOMA OF LEFT BREAST IN FEMALE, ESTROGEN RECEPTOR POSITIVE, UNSPECIFIED SITE OF BREAST: ICD-10-CM

## 2024-08-15 DIAGNOSIS — C50.912 LOBULAR CARCINOMA OF LEFT BREAST: Primary | ICD-10-CM

## 2024-08-15 DIAGNOSIS — T45.1X5A CHEMOTHERAPY-INDUCED NEUROPATHY: Primary | ICD-10-CM

## 2024-08-15 DIAGNOSIS — C50.919 MALIGNANT NEOPLASM OF FEMALE BREAST, UNSPECIFIED ESTROGEN RECEPTOR STATUS, UNSPECIFIED LATERALITY, UNSPECIFIED SITE OF BREAST: Primary | ICD-10-CM

## 2024-08-15 PROCEDURE — 99214 OFFICE O/P EST MOD 30 MIN: CPT | Mod: PBBFAC | Performed by: INTERNAL MEDICINE

## 2024-08-15 PROCEDURE — 63600175 PHARM REV CODE 636 W HCPCS: Mod: JZ,JG | Performed by: INTERNAL MEDICINE

## 2024-08-15 PROCEDURE — 96402 CHEMO HORMON ANTINEOPL SQ/IM: CPT

## 2024-08-15 PROCEDURE — 99214 OFFICE O/P EST MOD 30 MIN: CPT | Mod: S$PBB,,, | Performed by: INTERNAL MEDICINE

## 2024-08-15 RX ORDER — TAMOXIFEN CITRATE 20 MG/1
20 TABLET ORAL DAILY
Qty: 30 TABLET | Refills: 11 | Status: SHIPPED | OUTPATIENT
Start: 2024-08-15 | End: 2025-08-15

## 2024-08-15 RX ADMIN — LEUPROLIDE ACETATE 3.75 MG: KIT at 10:08

## 2024-08-15 NOTE — PROGRESS NOTES
History:  Past Medical History:   Diagnosis Date    Breast cancer     HTN (hypertension)    Past medical history: Hypertension. Obesity. Tubal ligation in 1999.  -Right breast at 6:00, FNA (05/26/2016): Cyst with apocrine metaplasia    Social history: Single. Lives in [place], [place]. Has 2 children. She is an  at a nursing home. Denies history of tobacco, alcohol, or illicit drug abuse.    Family history: Maternal grandmother experienced breast cancer twice, in her 50s, and then in her 80s. One maternal cousin experienced uterine cancer in her 40s. Another maternal cousin experienced uterine cancer in her 50s. Mother's maternal uncle experienced some kind of cancer in the 70s.    Health maintenance:  ThinPrep cervical Pap smear (07/25/2016: Negative for intraepithelial lesion or malignancy.  Bilateral diagnostic mammogram 05/15/2017, was BI-RADS Category 2, benign.    Menstrual and OB/GYN history: Menarche at age 12. First childbirth at age 18. No history of abortions or miscarriages. Used birth control pills many years back. Currently, as of 11/11/2019, regular and normal menstrual periods.    Past Surgical History:   Procedure Laterality Date    AUGMENTATION OF BREAST Left     BREAST SURGERY Right     tram flap of rt breast    MASTECTOMY Right     TUBAL LIGATION        Social History     Socioeconomic History    Marital status: Single   Tobacco Use    Smoking status: Never    Smokeless tobacco: Never   Substance and Sexual Activity    Alcohol use: Not Currently     Comment: occassionally    Drug use: Never    Sexual activity: Not Currently     Partners: Male     Social Determinants of Health     Financial Resource Strain: Low Risk  (3/30/2023)    Overall Financial Resource Strain (CARDIA)     Difficulty of Paying Living Expenses: Not very hard   Food Insecurity: No Food Insecurity (3/30/2023)    Hunger Vital Sign     Worried About Running Out of Food in the Last Year: Never true     Ran  Out of Food in the Last Year: Never true   Transportation Needs: No Transportation Needs (3/30/2023)    PRAPARE - Transportation     Lack of Transportation (Medical): No     Lack of Transportation (Non-Medical): No   Physical Activity: Inactive (3/30/2023)    Exercise Vital Sign     Days of Exercise per Week: 0 days     Minutes of Exercise per Session: 0 min   Stress: Stress Concern Present (3/30/2023)    [employer] [place] [employer] - Occupational Stress Questionnaire     Feeling of Stress : To some extent   Housing Stability: Low Risk  (3/30/2023)    Housing Stability Vital Sign     Unable to Pay for Housing in the Last Year: No     Number of Places Lived in the Last Year: 1     Unstable Housing in the Last Year: No      Family History   Problem Relation Name Age of Onset    Hypertension Mother      No Known Problems Father        Reason for Follow-up:  -metachronous hormone receptor positive invasive lobular carcinoma of left breast, S/P biopsy 03/22/2024; S/P left mastectomy 07/02/2024; pT1b pN0 (sn), grade 2, ER positive, KS negative, HER2 negative, AJCC anatomic stage IA, pathological prognostic stage I A  -history of IDC right breast, inflammatory carcinoma, cT4d cN1 M0, AJCC anatomic stage IIIB, clinical prognostic stage IIIB, S/P biopsy 10/01/2019, ER 93.2%, KS 90.3%, HER2 negative, Ki 6715-3%, S/P neoadjuvant therapy, followed by simple right mastectomy and right axillary lymph node dissection 05/27/2020, ypT2 ypN2a, 2.0 cm residual tumor, 3 lymph nodes with macrometastases, 2 lymph nodes with micrometastasis, no definite response to neoadjuvant chemotherapy, then adjuvant radiotherapy and adjuvant endocrine therapy  -Taxol induced peripheral neuropathy  -premenopausal  -family history of breast cancer and uterine cancer  -uterine fibroids    History of Present Illness:   Infiltrating ductal carcinoma of breast, right      Oncologic/Hematologic History:  Oncology History   Lobular carcinoma of  left breast   4/13/2024 Initial Diagnosis    Lobular carcinoma of left breast     7/2/2024 Cancer Staged    Staging form: Breast, AJCC 8th Edition  - Pathologic stage from 7/2/2024: Stage IA (pT1b, pN0, cM0, G2, ER+, MN-, HER2-)     Invasive ductal carcinoma of right breast in female   10/1/2019 Cancer Staged    Staging form: Breast, AJCC 8th Edition  - Clinical stage from 10/1/2019: Stage IIIB (cT4d, cN1, cM0, G2, ER+, MN+, HER2-)     5/27/2020 Cancer Staged    Staging form: Breast, AJCC 8th Edition  - Pathologic stage from 5/27/2020: ypT2, pN2a, cM0, G1, ER+, MN+, HER2-     4/13/2024 Initial Diagnosis    Invasive ductal carcinoma of right breast in female     44-year-old female referred by Dr. Stephanie Silva for breast cancer.    Patient with history of inflammatory carcinoma right breast, S/P neoadjuvant chemotherapy, right simple mastectomy, right axillary lymph node dissection, adjuvant radiotherapy, adjuvant endocrine therapy, subsequently developing metachronous invasive lobular carcinoma of left breast.    Please see assessment and plan section for details    1/11/2019:  Presents for initial oncology consultation, accompanied by her mother and daughter. Overall, feels quite well except for some fatigue. Has experienced some right upper extremity numbness, pain, and stinging sensation for last 1 month as a result of this, she is preferring left arm for some activities, like typing, etc. Appetite is good. No unintentional weight loss. No unusual headaches, focal neuro symptoms, chest pain, cough, dyspnea, fevers, chills, abdominal pain, nausea, vomiting, GI bleeding, bone pains, any urinary problems, etc. ECOG 0.    Interval History:  [No matching plan found]   [No matching plan found]     08/15/2024:   -Oncotype DX breast recurrence score: 13  -07/22/2024:  Hemoglobin 12.5; CBC essentially unremarkable; MCV 95.0, slightly elevated; CMP reviewed  Presents for a follow-up visit.  Overall, doing well.  Plans to  have left breast reconstructed in [place], as well.  Appetite is okay.  No unusual headaches, focal neurological symptoms, chest pain, cough, dyspnea, abdominal pain, nausea, vomiting, etc..  Appetite is fair.  We discussed plan of adjuvant therapy.    Medications:  Current Outpatient Medications on File Prior to Visit   Medication Sig Dispense Refill    calcium carbonate (OS-MARK) 600 mg calcium (1,500 mg) Tab Take 600 mg by mouth.      DULoxetine (CYMBALTA) 60 MG capsule Take 1 capsule (60 mg total) by mouth every evening. 30 capsule 4    gabapentin (NEURONTIN) 400 MG capsule Take 1 capsule (400 mg total) by mouth 3 (three) times daily. 120 capsule 3    hydrOXYzine HCL (ATARAX) 10 MG Tab Take 10 mg by mouth.      ondansetron (ZOFRAN) 4 MG tablet TAKE 1 TABLET BY MOUTH EVERY 8 HOURS AS NEEDED FOR NAUSEA 30 tablet 3    pantoprazole (PROTONIX) 40 MG tablet Take 1 tablet (40 mg total) by mouth once daily. 30 tablet 3    vitamin D (VITAMIN D3) 1000 units Tab Take 1 tablet (1,000 Units total) by mouth once daily. 60 tablet 3    docusate sodium (COLACE) 100 MG capsule Take 1 capsule (100 mg total) by mouth 2 (two) times daily as needed for Constipation. (Patient not taking: Reported on 4/18/2024) 90 capsule 1    lisinopriL-hydrochlorothiazide (PRINZIDE,ZESTORETIC) 10-12.5 mg per tablet Take 1 tablet by mouth once daily. (Patient not taking: Reported on 6/18/2024) 90 tablet 3    [DISCONTINUED] tamoxifen (NOLVADEX) 10 MG Tab tamoxifen Take No date recorded No form recorded No frequency recorded No route recorded No set duration recorded No set duration amount recorded active No dosage strength recorded No dosage strength units of measure recorded (Patient not taking: Reported on 4/18/2024.)       No current facility-administered medications on file prior to visit.       Review of Systems:   All systems reviewed and found to be negative except for the symptoms detailed above    Physical Examination:   VITAL SIGNS:    Vitals:    08/15/24 0851   BP: (!) 146/88   Pulse: 87   Resp: 18   Temp: 99.1 °F (37.3 °C)       GENERAL:  In no apparent distress.    HEAD:  No signs of head trauma.  EYES:  Pupils are equal.  Extraocular motions intact.    EARS:  Hearing grossly intact.  MOUTH:  Oropharynx is normal.   NECK:  No adenopathy, no JVD.     CHEST:  Chest with clear breath sounds bilaterally.  No wheezes, rales, rhonchi.    CARDIAC:  Regular rate and rhythm.  S1 and S2, without murmurs, gallops, rubs.  VASCULAR:  No Edema.  Peripheral pulses normal and equal in all extremities.  ABDOMEN:  Soft, without detectable tenderness.  No sign of distention.  No   rebound or guarding, and no masses palpated.   Bowel Sounds normal.  MUSCULOSKELETAL:  Good range of motion of all major joints. Extremities without clubbing, cyanosis or edema.    NEUROLOGIC EXAM:  Alert and oriented x 3.  No focal sensory or strength deficits.   Speech normal.  Follows commands.  PSYCHIATRIC:  Mood normal.  11/11/2019: Bilateral breast examination was performed with her verbal consent, in the presence of her mother and daughter, and the presence of Prenella, LPN; right breast is large, with orange peel appearance (erythema, erythematous and dimpled skin) involving <1/3 of the skin in the periareolar, 6:00, 3:00, and 9:00 positions; no nipple discharge; no palpable regional lymphadenopathy; left breast free from any suspicious lesions/masses or regional lymphadenopathy. No palpable hepatomegaly. Lungs clear to auscultation.  11/25/2019: Breast examination performed with her verbal consent, in the presence of Prenella, LPN; right breast is diffusely enlarged, approximately 13 cm x 12 cm enlargement in the lower quadrants; orange peel appearance of skin is noted along with some erythema, involving 3 o'clock position, 6 o'clock position, 9 o'clock position, and periareolar area, definitely approximately 50% of the skin of the breast.  02/05/2020: Right breast examined  with her verbal consent, in the presence of nursing staff, and with her verbal consent: Right breast is much smaller and much softer without discretely palpable lump; no skin retraction, skin ulceration, fixation, erythema, or orange peel appearance; no nipple retraction; no palpable lymphadenopathy in axillary, infraclavicular, supraclavicular, or cervical areas.  03/18/2020: Right breast examined with her verbal consent, in the presence of Prenella, LPN; right breast is completely soft, without discrete masses, but still with some orange peel appearance of the skin in the lower half but without erythema; no palpable regional lymphadenopathy.  04/16/2020: Breast examination performed with her verbal consent, in the presence of Prenella, LPN; right breast is completely soft, without discrete masses; a hint of orange peel appearance of the skin in the lower half of the breast, with a slight hint of erythema; no ulceration or satellite nodules; no fixation to chest wall; no palpable lymphadenopathy in axillary, supraclavicular, or infraclavicular areas.  04/30/2020: Breast examination performed with her verbal consent, in the presence of regular, LPN; right breast remains a supple, without discrete mass, with subtle darkening of skin in the lower half, without skin ulceration/satellitosis/fixation of breast tissue or nipple discharge; no lymphadenopathy palpable in right axilla; no tenderness in the right axilla. No supraclavicular or infraclavicular lymphadenopathy, either.    Results for orders placed or performed in visit on 04/15/24   CBC Auto Differential    Narrative    The following orders were created for panel order CBC Auto Differential.  Procedure                               Abnormality         Status                     ---------                               -----------         ------                     CBC with Differential[[id number]]                                                        Please view  results for these tests on the individual orders.   Results for orders placed or performed in visit on 01/29/24   CBC with Differential   Result Value Ref Range    WBC 6.06 4.50 - 11.50 x10(3)/mcL    RBC 4.25 4.20 - 5.40 x10(6)/mcL    Hgb 13.3 12.0 - 16.0 g/dL    Hct 39.7 37.0 - 47.0 %    MCV 93.4 80.0 - 94.0 fL    MCH 31.3 (H) 27.0 - 31.0 pg    MCHC 33.5 33.0 - 36.0 g/dL    RDW 12.0 11.5 - 17.0 %    Platelet 281 130 - 400 x10(3)/mcL    MPV 9.9 7.4 - 10.4 fL    Neut % 54.7 %    Lymph % 36.8 %    Mono % 5.0 %    Eos % 2.3 %    Basophil % 0.5 %    Lymph # 2.23 0.6 - 4.6 x10(3)/mcL    Neut # 3.32 2.1 - 9.2 x10(3)/mcL    Mono # 0.30 0.1 - 1.3 x10(3)/mcL    Eos # 0.14 0 - 0.9 x10(3)/mcL    Baso # 0.03 <=0.2 x10(3)/mcL    IG# 0.04 0 - 0.04 x10(3)/mcL    IG% 0.7 %    NRBC% 0.0 %     Results for orders placed or performed in visit on 01/29/24   Comprehensive Metabolic Panel   Result Value Ref Range    Sodium Level 143 136 - 145 mmol/L    Potassium Level 3.7 3.5 - 5.1 mmol/L    Chloride 110 (H) 98 - 107 mmol/L    Carbon Dioxide 24 22 - 29 mmol/L    Glucose Level 142 (H) 74 - 100 mg/dL    Blood Urea Nitrogen 12.5 7.0 - 18.7 mg/dL    Creatinine 0.78 0.55 - 1.02 mg/dL    Calcium Level Total 9.2 8.4 - 10.2 mg/dL    Protein Total 8.1 6.4 - 8.3 gm/dL    Albumin Level 3.7 3.5 - 5.0 g/dL    Globulin 4.4 (H) 2.4 - 3.5 gm/dL    Albumin/Globulin Ratio 0.8 (L) 1.1 - 2.0 ratio    Bilirubin Total 0.4 <=1.5 mg/dL    Alkaline Phosphatase 141 40 - 150 unit/L    Alanine Aminotransferase 20 0 - 55 unit/L    Aspartate Aminotransferase 21 5 - 34 unit/L    eGFR >60 mls/min/1.73/m2       Assessment:  Problem List Items Addressed This Visit          Neuro    Chemotherapy-induced neuropathy - Primary       Renal/    Uterine fibroid    Premenopausal patient       Oncology    Lobular carcinoma of left breast    Invasive ductal carcinoma of right breast in female    H/O right mastectomy    Family history of breast cancer    Family history of uterine  cancer    Carcinoma of left breast in female, estrogen receptor positive    History of left mastectomy       GI    Pelvic pain     History of multifocal IDC right breast:  (Inflammatory breast carcinoma)  -at least 5 breast masses; multiple abnormal lymph nodes in axilla on mammogram and ultrasound 09/24/2019  -biopsy 10/01/2019: IDC, intermediate grade, at least 1.2 cm, along with DCIS; right axillary lymph node biopsy positive as well  -ER 93.2%; NM 90.3%; HER2 negative (IHC and FISH testing); Ki-67 15.3% (borderline positive)  -physical exam 11/11/2019:  Orange peel appearance of skin of right breast, involving <1/3 of the skin   -no palpable regional lymphadenopathy but right axillary lymph node biopsy positive on biopsy  -staging bone scan and CTs 11/2019: No metastases  -TTE 11/2019: LVEF 60%  -cT4b cN1 M0, AJCC anatomic stage IIIB, clinical prognostic stage stage IIIB  -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  -cT4d cN1 M0, AJCC anatomic stage IIIB, clinical prognostic stage stage IIIB  (Biopsy positive axillary lymph node)  -ER 93.2%; NM 90.3%; HER2 negative (IHC and FISH testing); Ki-67 15.3% (borderline positive)  -Neoadjuvant DDAC x4 (12/13/2019-01/24/2020), clinically, with good response  -Neoadjuvant weekly Taxol x12 (02/07/2020-04/23/2020)   -05/27/2020: Simple right mastectomy, right axillary lymph node dissection:  Multiple foci of IDC, largest at least 2.0 cm, overall grade 1; DCIS also present; IDC margin 3.4 cm; DCIS margin 3.4 cm; 3 lymph nodes with macrometastases; 2 lymph nodes with micrometastasis; no definite response to neoadjuvant chemotherapy in IDC; LVI present  >>ypT2 ypN2a (2.0 cm tumor; 5 lymph nodes positive) (no definitive response to neoadjuvant chemotherapy)  ER positive (60%). NM positive (99%). HER-2 not overexpressed (score 0). Ki-67 low proliferation (2%)  -No metastasis (CT C/A/P, bone scan: 07/01/2020; brain MRI: 07/07/2020)  -Normal BMD (DEXA: 07/01/2020)  -S/P adjuvant  radiotherapy to right mastectomy bed 06/02/2020-08/07/2020  -for adjuvant endocrine therapy, started on monthly Lupron shots 07/28/2020  -for adjuvant endocrine therapy, Arimidex started 09/20/2020  -03/24/2021:  Right BANG reconstruction of breast:  Right chest skin excision (no malignancy); MediPort removal   -07/14/2021:  2nd stage reconstruction: Left breast reduction, right nipple creation, fat grafting to right breast, and abdominal scar revision  -pelvic ultrasound 09/20/2021:  Uterine fibroid, 1 of them exophytic, adjacent to left ovary   -Pap smear 11/10/2021: NILM  -pelvic MRI 12/15/2021:  Exophytic uterine fibroid 4 cm between uterine fundus and left ovary, stable going back to 2019  -developed right upper extremity lymphedema post axillary lymph node dissection; used lymphedema sleeve and machine  -03/23/2022:  Right breast reconstruction revision with excision of fat necrosis and fat grafting to right breast  -DEXA scan 07/07/2020: Normal BMD but decreased  -pelvic MRI 12/09/2022:  Stable uterine fibroids (exophytic mass towards the fundus up to 3.5 cm, stable; 1.5 cm myometrial mass, stable)  -pelvic ultrasound 07/26/2023:  Stable uterine fibroids  >>>  Metachronous hormone receptor positive invasive lobular carcinoma of left breast:  (failed adjuvant endocrine therapy with Lupron +Arimidex, which was started July/September 2020)  -mammogram/ultrasound 03/13/2024:  Left breast BI-RADS 4: Left upper outer breast 7 mm enhancing focal asymmetry, lesion of concern   -stereotactic biopsy left upper outer breast 7 mm lesion 03/22/2024:  Invasive lobular carcinoma, largest focus 2.1 mm   -ER 25%; OR 0%; HER2 score 0; Ki-67 low proliferation (1%)  -NGS testing on left breast biopsy performed 03/22/2024:  No genetic abnormalities detected for RNA gene fusions; nondiagnostic study (unable to amplified DNA); positive PD-L1 expression for Keytruda based on CPS 5; no PD-L1 expression based on TPS <1%  -CTs C/A/P  05/21/2024: No metastases  -bone scan 05/28/2024: No metastases  -DEXA scan 05/28/2024:  Normal BMD since 07/13/2023 DEXA scan  -CT head without contrast 05/29/2024:  No metastases; no meningioma  -07/02/2024:  ([facility]; Remi Laughlin MD):  Left breast mastectomy with a tissue expander placement and left SLN biopsy: Invasive lobular carcinoma, 9 mm; margins negative; upper outer quadrant; overall grade 2; no DCIS; no LCIS; no LVI; all margins negative for invasive carcinoma (distance from invasive carcinoma to closest margin, 4 mm); # of lymph nodes examined, 1; # of SLN examined, 1; all regional lymph nodes negative for tumor (ER positive, IA negative, HER2 negative)  >>> pT1b pN0 (sn), grade 2, ER positive, IA negative, HER2 negative, AJCC anatomic stage IA, pathological prognostic stage I A  -07/15/2024:  11:24 a.m.:  Before monthly Lupron shot:  Estradiol <24 pg/mL; FSH 3.06 mIU /ml (premenopausal)  -07/15/2024:  Monthly Lupron given at 11:41 a.m.  -Oncotype DX breast recurrence score: 13      Molecular testing:  -genetic testing 12/02/2019:  Essentially negative  -liquid biopsy 04/15/2024: Negative for any relevant findings  -NGS testing on left breast biopsy performed 03/22/2024:  No genetic abnormalities detected for RNA gene fusions; nondiagnostic study (unable to amplified DNA); positive PD-L1 expression for Keytruda based on CPS 5; no PD-L1 expression based on TPS <1%      Taxol induced peripheral neuropathy:  -Gabapentin started 02/19/2020  -Cymbalta 60 mg p.o. nightly started 03/18/2020      Premenopausal as of 11/11/2019 by history:  -as of 07/09/2020, no menstrual cycles since December 2019 (after starting neoadjuvant chemotherapy)  -premenopausal as of 07/15/2024, as well      Family history of breast cancer and uterine cancers  -12/02/2019: Genetic testing: Three (3) variants of uncertain significance (VUS): AXIN2 c.1573C>G (p.Egf524Cgx), msh3 c.2005C>T (p.Cuq613Omn), and POLE c.1007A>G  (p.Hit846Aui)      Exophytic fibroid versus left adnexal mass (4 cm, solid) on CT 11/22/2019:  -12/03/2019: Pelvic ultrasound: No discrete sonographic pathology  -pelvic ultrasound 09/20/2021:  Uterine fibroid, 1 of them exophytic, adjacent to left ovary   -Pap smear 11/10/2021: NILM  -pelvic MRI 12/15/2021:  Exophytic uterine fibroid 4 cm between uterine fundus and left ovary, stable going back to 2019  -pelvic MRI 12/09/2022:  Stable uterine fibroids (exophytic mass towards the fundus up to 3.5 cm, stable; 1.5 cm myometrial mass, stable)  -pelvic ultrasound 07/26/2023:  Stable uterine fibroids      Plan:  No indication of adjuvant chemotherapy or radiotherapy  Continue monthly Lupron for ovarian suppression, until 07/2025  In October, recheck FSH and estradiol level before monthly shot of Lupron  Start tamoxifen 20 mg daily  DEXA scan May 2026  Baseline gyn examination now, then, annually as long as on tamoxifen  Follow-up with NP in 3 months  --------------------------------------------      -premenopausal patient  -S/P left mastectomy 07/02/2024  - pT1b pN0 (sn), grade 2, ER positive, AK negative, HER2 negative, AJCC anatomic stage IA, pathological prognostic stage I A  -07/15/2024:  11:24 a.m.:  Before monthly Lupron shot:  Estradiol <24 pg/mL; FSH 3.06 mIU /ml (remains premenopausal)  -07/15/2024:  Monthly Lupron given at 11:41 a.m.  -per SOFT and TEXT trials, optimal duration of ovarian suppression therapy is 5 years (07/28/2020-07/2025); no efficacy or safety data to support prolonged ovarian function suppression  -premenopausal patients wishing to continue adjuvant endocrine therapy after ovarian function suppression is stopped, should use tamoxifen  -menopausal status can not be determined while receiving OFS  -check FSH and serum estradiol prior to next dose of Lupron for ovarian function assessment (premenopausal as of 07/15/2024)  -since she has failed adjuvant ovarian suppression therapy +Arimidex,  therefore, we have discontinued Arimidex; we have switched to ovarian suppression therapy + tamoxifen (see above)  -in terms of genetic testing, genetic testing (12/02/2019)  was negative in the past  -Oncotype DX breast recurrence score: 13  >>>  -recurrence score <15; therefore, adjuvant therapy recommendation:  Adjuvant endocrine therapy +/- ovarian suppression/ablation  -no indication of adjuvant chemotherapy  -continue monthly Lupron for ovarian suppression  -start tamoxifen 20 mg p.o. q.day (failed OFS/Arimidex in adjuvant setting; we have discontinued Arimidex)  -if becomes postmenopausal after 5 years of adjuvant tamoxifen, then aromatase inhibitor for 5 years (category 1 option); or, continue tamoxifen for an additional 5 years to complete 10 years  -if remains premenopausal after 5 years of tamoxifen, then, consider tamoxifen for an additional 5 years complete 10 years, or no further endocrine therapy  -no indication of adjuvant radiotherapy  -continue monthly Lupron for ovarian suppression (maximum, until 07/2025, i.e., 07/28/2020-07/2025)  -ovarian function suppression (monthly Lupron; to stop 07/2025 after 5 years of OFS)  -mid October, check FSH and estradiol level before next monthly shot of Lupron  -DEXA scan in 2 years (May 2026)  -In the absence of clinical signs or symptoms suggestive of recurrent disease, there is no indication for laboratory or imaging studies for metastasis screening;  -Active lifestyle, healthy diet, limited alcohol intake, and achieving and maintaining an ideal body weight (20-25 BMI) may lead to optimal breast cancer outcomes  -she has reconstructed right breast; routine imaging of reconstructed breasts is not indicated  -as of 08/15/2024, she is planning to have left breast reconstructed as well    Monitoring parameters with the tamoxifen:  CBC with platelets, serum calcium, liver function tests;  triglycerides and cholesterol (in patients with preexisting  hyperlipidemias);  pregnancy test (prior to treatment in females of reproductive potential);  monitor for and promptly evaluate abnormal vaginal bleeding, menstrual irregularities, changes in vaginal discharge, or pelvic pain/pressure;  gynecologic exam (baseline and annually, continuing after discontinuation of therapy),  signs/symptoms of thromboembolism (eg, stroke, DVT [leg swelling, tenderness], or PE [shortness of breath]);  ophthalmic exam (if vision problem or cataracts);  bone mineral density (premenopausal women). Monitor adherence.  -Abnormal vaginal bleeding on tamoxifen therapy should prompt evaluation for endometrial cancer if uterus intact.   If anticipated elective surgery while on tamoxifen, then consider discontinuing tamoxifen prior to elective surgery, and resume tamoxifen postoperatively when ambulation is normal.   If DVT, PE, CVA, or prolonged immobilization while on tamoxifen, then discontinue tamoxifen, and treat underlying condition.     Tamoxifen:  ALERT: US Boxed Warning:  Uterine malignancies and thromboembolic events:  Serious and life-threatening events from the use of tamoxifen include uterine malignancies, stroke, and pulmonary embolism. Fatal cases of each type of event have occurred.  Incidence rates per 1,000 woman-years:  Endometrial adenocarcinoma: 2.20 for tamoxifen versus 0.71 for placebo  Uterine sarcoma: 0.17 for tamoxifen versus 0.04 for placebo  Stroke: 1.43 for tamoxifen versus 1.00 for placebo.  Pulmonary embolism: 0.75 for tamoxifen versus 0.25 for placebo  For most patients already diagnosed with breast cancer, the benefits of tamoxifen outweigh its risks.     Ocular effects with the tamoxifen:  Ocular effects (including corneal deposits, cataract, epithelial keratopathy, macular edema, maculopathy, optic neuritis, retinal thrombosis, retinopathy, and vision color changes) have been reported in patients taking tamoxifen. An increased need for cataract surgery has also  been reported.  Onset: Variable; may occur at any time after tamoxifen initiation and may last after tamoxifen discontinuation. Most reports are after 2 years of treatment,.    Thromboembolic events with tamoxifen:  Serious and life-threatening thromboembolic events (some fatal) have occurred with tamoxifen, including cerebrovascular accident (stroke), deep vein thrombosis (DVT), and pulmonary embolism (PE). Risk normalized after discontinuation of therapy.  Onset: Delayed; PE events occurred at an average of 27 months after tamoxifen initiation (range: 2 to 60 months). DVT occurred at an average of 19 months (range: 2 to 57 months). Stroke occurred at an average of 30 months (range: 1 to 63 months).  Risk factors:  Age> 49 years, 1st 24 months abuse, high BMI (=/> 25), history of surgery/fracture/immobilization, factor 5 Leiden mutation, smoking, personal history/family history of venous thromboembolism, hypertension, dyslipidemia    Uterine malignancies:  Uterine malignancies have been reported with tamoxifen, including fatal cases of endometrial carcinoma and uterine carcinoma (sarcoma). The increased risk of endometrial cancer continues during therapy with tamoxifen but decreases after treatment discontinuation.  The estrogenic effects of tamoxifen are associated with endometrial polyp formation, endometrial hyperplasia, and uterine fibroids which lead to increased risk of endometrial carcinoma and uterine sarcoma  Onset: Delayed; endometrial carcinoma has been reported as early as 6 months after initiation to 6 years after discontinuing tamoxifen. Uterine sarcomas have been reported to develop a median of 6 to 9 years after initiation of tamoxifen, with a case developing 20 years after initiation of therapy.  Risk factors:  Cumulative dose.  Prior estrogen replacement therapy.  Obesity.  History of endometrial abnormalities.  Duration of therapy =/> 2 years.     Taxol induced peripheral neuropathy  -gabapentin  started 02/19/2020; Cymbalta started 03/18/2020    Premenopausal as of 11/11/2019 by history  -as of 07/09/2020, no menstrual cycles since December 2019 post neoadjuvant chemotherapy  -07/15/2024:  11:24 a.m.:  Before monthly Lupron shot:  Estradiol <24 pg/mL; FSH 3.06 mIU /ml (remains premenopausal)    -family history of breast cancer and uterine cancers   -genetic testing negative (12/02/2019)    Breast cancer surveillance:  -History and physical exam 1-4 times per year for 5 years, then annually;  -she is S/P bilateral mastectomy, therefore, no role of surveillance mammography  -she has reconstructed right breast; routine imaging of reconstructed breasts is not indicated  -as of 08/15/2024, she is planning to have left breast reconstructed as well  -Woman or tamoxifen: Annual GYN assessment if uterus present;  -In the absence of clinical signs or symptoms suggestive of recurrent disease, there is no indication for laboratory or imaging studies for metastasis screening;  -Active lifestyle, healthy diet, limited alcohol intake, and achieving and maintaining an ideal body weight (20-25 BMI) may lead to optimal breast cancer outcomes.     Follow-up with NP in 3 months    Above discussed at length with the patient.  All questions answered.    Discussed labs, scans, and Oncotype DX breast cancer recurrence record, and gave her copies of relevant records.    Plan of adjuvant therapy discussed.  Potential side effects of tamoxifen discussed.  Gave her educational materials from [employer].  She understands and agrees with this plan.       Follow-up:  No follow-ups on file.

## 2024-08-15 NOTE — Clinical Note
No indication of adjuvant chemotherapy or radiotherapy Continue monthly Lupron for ovarian suppression, until 07/2025 In October, recheck FSH and estradiol level before monthly shot of Lupron Start tamoxifen 20 mg daily DEXA scan May 2026 Baseline gyn examination now, then, annually as long as on tamoxifen Follow-up with NP in 3 months

## 2024-09-05 ENCOUNTER — OFFICE VISIT (OUTPATIENT)
Dept: INTERNAL MEDICINE | Facility: CLINIC | Age: 49
End: 2024-09-05

## 2024-09-05 VITALS
SYSTOLIC BLOOD PRESSURE: 132 MMHG | HEART RATE: 83 BPM | OXYGEN SATURATION: 100 % | WEIGHT: 196.38 LBS | RESPIRATION RATE: 18 BRPM | HEIGHT: 65 IN | TEMPERATURE: 99 F | DIASTOLIC BLOOD PRESSURE: 89 MMHG | BODY MASS INDEX: 32.72 KG/M2

## 2024-09-05 DIAGNOSIS — M79.604 LOWER EXTREMITY PAIN, RIGHT: ICD-10-CM

## 2024-09-05 DIAGNOSIS — C50.911 INFILTRATING DUCTAL CARCINOMA OF BREAST, RIGHT: Primary | ICD-10-CM

## 2024-09-05 DIAGNOSIS — C50.912 INVASIVE DUCTAL CARCINOMA OF LEFT BREAST: ICD-10-CM

## 2024-09-05 DIAGNOSIS — I10 HYPERTENSION, UNSPECIFIED TYPE: ICD-10-CM

## 2024-09-05 PROCEDURE — 99214 OFFICE O/P EST MOD 30 MIN: CPT | Mod: PBBFAC

## 2024-09-05 RX ORDER — ONDANSETRON 4 MG/1
4 TABLET, ORALLY DISINTEGRATING ORAL EVERY 8 HOURS PRN
COMMUNITY

## 2024-09-05 NOTE — PROGRESS NOTES
I have reviewed the notes, assessments, and/or procedures performed this visit, and I concur with the documentation. Using compression stockings, 20-30mmHg for BLE edema. If still not improved next visit then can do venous reflux scan. Low suspicion for cardiac etiology of swelling. No orthopnea, no PND, no PHIPPS.

## 2024-09-05 NOTE — PROGRESS NOTES
[facility] INTERNAL MEDICINE  OUTPATIENT OFFICE VISIT NOTE       Chief Complaint: Follow-up and Varicose Veins       HPI: Jeni Mejia is a 49 y.o. yo female with  has a past medical history of Breast cancer and HTN (hypertension)., who presents for  follow-up appointment . Patient presents today with complaints of bilateral thigh veins/pain. Patient reports varicose veins have progressively gotten worse and she has noticed some periodic pains as they are worsening. She denies any trauma or falls during this time. Otherwise, patient reports she has been doing well.  Patient was recently seen in [place] for left mastectomy which found to have invasive ductal carcinoma of the left breast.  Patient was seen again by Hematology/Oncology he stated patient need additional adjunct chemo/radiation therapy.  May plan to continue patient on hormonal therapy with Lupron and tamoxifen at this time.  Otherwise, patient does have follow up appointment with [place] for breast reconstruction surgery.      Past Medical History:   has a past medical history of Breast cancer and HTN (hypertension).     Past Surgical History:   has a past surgical history that includes Mastectomy (Right); Augmentation of breast (Left); Breast surgery (Right); and Tubal ligation.     Family History:  family history includes Hypertension in her mother; No Known Problems in her father.     Social History:   reports that she has never smoked. She has never used smokeless tobacco. She reports that she does not currently use alcohol. She reports that she does not use drugs.     Allergies:  is allergic to sulfa (sulfonamide antibiotics) and sulfamethoxazole-trimethoprim.     Home Medications:  Prior to Admission medications    Medication Sig Start Date End Date Taking? Authorizing Provider   anastrozole (ARIMIDEX) 1 mg Tab Take 1 tablet (1 mg total) by mouth once daily. 9/7/23  Yes Kasia Jerry FNP   calcium 26-vit D3-magnesium 15 167 mg calcium-  1.67 mcg-83 mg Cap   Calcium 1200mg plus Vitamin D3 25mcg, See Instructions, take one daily, 0 Refill(s) 2/7/22  Yes Provider, Historical   calcium carbonate (OS-MARK) 600 mg calcium (1,500 mg) Tab Take 600 mg by mouth.   Yes Provider, Historical   meloxicam (MOBIC) 7.5 MG tablet Take 1 tablet (7.5 mg total) by mouth once daily. Take 1 tablet as needed for pain daily 3/30/23  Yes Jorge Shell MD   ondansetron (ZOFRAN) 4 MG tablet TAKE 1 TABLET BY MOUTH EVERY 8 HOURS AS NEEDED FOR NAUSEA 3/30/23  Yes Jorge Shell MD   tamoxifen (NOLVADEX) 10 MG Tab tamoxifen Take No date recorded No form recorded No frequency recorded No route recorded No set duration recorded No set duration amount recorded active No dosage strength recorded No dosage strength units of measure recorded   Yes Provider, Historical   vitamin D (VITAMIN D3) 1000 units Tab Take 1,000 Units by mouth once daily. 7/1/22  Yes Provider, Historical   docusate sodium (COLACE) 100 MG capsule Take 1 capsule (100 mg total) by mouth 2 (two) times daily as needed for Constipation. 4/18/23 9/11/23 Yes Jorge Shell MD   DULoxetine (CYMBALTA) 60 MG capsule Take 1 capsule (60 mg total) by mouth every evening. 8/18/22 9/11/23 Yes Kasia Jerry FNP   gabapentin (NEURONTIN) 400 MG capsule Take 400 mg by mouth. 2/7/22 9/11/23 Yes Provider, Historical   lisinopriL-hydrochlorothiazide (PRINZIDE,ZESTORETIC) 10-12.5 mg per tablet Take by mouth. 2/7/22 9/11/23 Yes Provider, Historical   docusate sodium (COLACE) 100 MG capsule Take 1 capsule (100 mg total) by mouth 2 (two) times daily as needed for Constipation. 9/11/23   Jorge Shell MD   DULoxetine (CYMBALTA) 60 MG capsule Take 1 capsule (60 mg total) by mouth every evening. 9/11/23   Jorge Shell MD   gabapentin (NEURONTIN) 400 MG capsule Take 1 capsule (400 mg total) by mouth 3 (three) times daily. 9/11/23   Jorge Shell MD   hydrOXYzine HCL (ATARAX) 10 MG Tab  "Take 10 mg by mouth. 8/10/23   Provider, Historical   lisinopriL-hydrochlorothiazide (PRINZIDE,ZESTORETIC) 10-12.5 mg per tablet Take 1 tablet by mouth once daily. 9/11/23 9/5/24  Jorge Shell MD       ROS:  Constitutional: no fever, fatigue, weakness  Eye: no vision loss, eye redness, drainage, or pain  ENMT: no sore throat, ear pain, sinus pain/congestion, nasal congestion/drainage  Respiratory: no cough, no wheezing, no shortness of breath  Cardiovascular: no chest pain, no palpitations, no edema  Gastrointestinal: no nausea, vomiting, or diarrhea. No abdominal pain  Genitourinary: no dysuria, no urinary frequency or urgency, no hematuria  Hema/Lymph: no abnormal bruising or bleeding  Endocrine: no heat or cold intolerance, no excessive thirst or excessive urination  Musculoskeletal: no muscle or joint pain, no joint swelling  Integumentary: no skin rash or abnormal lesion  Neurologic: no headache, no dizziness, no weakness or numbness    OBJECTIVE:     Vital signs:   /89 (BP Location: Left arm, Patient Position: Sitting, BP Method: Medium (Automatic))   Pulse 83   Temp 98.6 °F (37 °C) (Oral)   Resp 18   Ht 5' 5" (1.651 m)   Wt 89.1 kg (196 lb 6.4 oz)   SpO2 100%   BMI 32.68 kg/m²      Physical Examination:  Physical Exam  HENT:      Head: Normocephalic and atraumatic.      Mouth/Throat:      Mouth: Mucous membranes are moist.      Pharynx: Oropharynx is clear.   Eyes:      Conjunctiva/sclera: Conjunctivae normal.      Pupils: Pupils are equal, round, and reactive to light.   Cardiovascular:      Rate and Rhythm: Normal rate and regular rhythm.      Pulses: Normal pulses.      Heart sounds: No murmur heard.  Pulmonary:      Effort: Pulmonary effort is normal. No respiratory distress.      Breath sounds: No wheezing.   Chest:      Chest wall: No tenderness.   Abdominal:      General: Abdomen is flat. Bowel sounds are normal. There is no distension.      Palpations: Abdomen is soft.      " Tenderness: There is no abdominal tenderness.   Musculoskeletal:         General: No swelling. Normal range of motion.      Cervical back: Normal range of motion.   Skin:     General: Skin is warm.   Neurological:      General: No focal deficit present.      Mental Status: She is alert and oriented to person, place, and time.          Labs:  Lab Results   Component Value Date    WBC 4.93 08/15/2024    HGB 12.7 08/15/2024    HCT 38.8 08/15/2024     08/15/2024    MCV 94.4 (H) 08/15/2024    RDW 12.6 08/15/2024    Lab Results   Component Value Date     08/15/2024    K 3.6 08/15/2024     08/15/2024    CO2 28 08/15/2024    BUN 11.9 08/15/2024    CREATININE 0.83 08/15/2024    CALCIUM 9.8 08/15/2024    MG 2.00 08/15/2024      Lab Results   Component Value Date    HGBA1C 5.7 04/27/2022    .9 04/27/2022    CREATININE 0.83 08/15/2024    CREATRANDUR 242.1 (H) 10/12/2021    PROTEINURINE 14.4 10/12/2021    Lab Results   Component Value Date    TSH 1.134 07/26/2023    JRNIRM0WOBC 0.99 07/26/2023                  ASSESSMENT & PLAN:     Breast cancer-Stage IIIB (cT4b cN1 M0) Multifocal Infiltrating Ductal Carcinoma of the Right Breast; s/p reconstructive surgery  Invasive ductal carcinoma of left breast   -ER positive (93.2%), VT positive (90.3%), HER-2 negative, Ki-67: 15.3% (borderline)  -Lupron monthly (started July 28, 2020) and Arimidex  -S/p right mastectomy and lymph node dissection May 2020; last chemo April 2020, last radiation August 2020. S/p liposuction and breast augmentation (2021) in [place]  -Patient being seen by Dr. Fraser in [facility] hematology/oncology clinic  -Currently on Arimidex, Calcium and Vitamin D  -Now with left upper breast pathology showing invasive lobular carcinoma   -Mastectomy with [place] on 7/2/24 w/ stage invasive lobular carcinoma   -No need for further chemo/radiation therapy per Hematology/Oncology  -Will continue to follow    Uterine fibroids  -Reports lower  abdominal pain going on for months; does not have reported bleeding  -Transvaginal ultrasound ordered and showed lesion in the uterus most probably related to fibroids; one of them exophytic. Exophytic fibroid appeared to be adjacent to the left ovary.  -MRI pelvis showed appearence of fibroid uterus  -follows with Gynecology, started on over-the-counter vaginal moisturizers    Varicose veins  -Educated patient on importance of life styles changes  -Educated patient on importance of leg elevation at the end of the day and use of compression stockings  -Consider US venous insufficiency next visit if not improved or symptoms worsen     Hypertension  -Well controlled; 132/89 today  -Continue HCTZ 12.5 mg-lisinopril 10 mg daily   -Continue to monitor, refill medications at this time    Health Maintenance   Topic Date Due    Colorectal Cancer Screening  04/11/2026    TETANUS VACCINE  12/07/2026    Lipid Panel  04/27/2027    Hepatitis C Screening  Completed        Health Maintenance/ Wellness  Pneumococcal vaccine: #13 (3/5/2020)  TDAP: Up-to-date 2016  Influenza vaccine: UTD 11/22  Shingrix vaccine: Not applicable; performed at age 50  Screening colonoscopy: Cologuard negative  Pap smear: UTD 2021  Mammogram: S/P MASTECTOMY      Labs ordered: N/A  Imaging: N/A  Medications: reconciled, discussed and refills given.  RTC in 6 months      Jorge Shell MD  [facility], -3

## 2024-09-12 ENCOUNTER — INFUSION (OUTPATIENT)
Dept: INFUSION THERAPY | Facility: HOSPITAL | Age: 49
End: 2024-09-12
Attending: INTERNAL MEDICINE

## 2024-09-12 VITALS
WEIGHT: 200.19 LBS | SYSTOLIC BLOOD PRESSURE: 124 MMHG | BODY MASS INDEX: 33.35 KG/M2 | TEMPERATURE: 98 F | HEIGHT: 65 IN | HEART RATE: 79 BPM | RESPIRATION RATE: 20 BRPM | OXYGEN SATURATION: 99 % | DIASTOLIC BLOOD PRESSURE: 88 MMHG

## 2024-09-12 DIAGNOSIS — C50.919 MALIGNANT NEOPLASM OF FEMALE BREAST, UNSPECIFIED ESTROGEN RECEPTOR STATUS, UNSPECIFIED LATERALITY, UNSPECIFIED SITE OF BREAST: Primary | ICD-10-CM

## 2024-09-12 PROCEDURE — 96402 CHEMO HORMON ANTINEOPL SQ/IM: CPT

## 2024-09-12 PROCEDURE — 63600175 PHARM REV CODE 636 W HCPCS: Mod: JZ,JG | Performed by: INTERNAL MEDICINE

## 2024-09-12 RX ADMIN — LEUPROLIDE ACETATE 3.75 MG: KIT at 01:09

## 2024-09-12 NOTE — NURSING
1316  Pt arrived for q 4 week lupron (RGM).  Pt rates LOP 4/10 to rt leg with varicose veins and left breast where an expander was placed for future reconstruction.  Pt does report feeling tired after a full day yesterday.

## 2024-10-10 ENCOUNTER — INFUSION (OUTPATIENT)
Dept: INFUSION THERAPY | Facility: HOSPITAL | Age: 49
End: 2024-10-10
Attending: INTERNAL MEDICINE

## 2024-10-10 ENCOUNTER — APPOINTMENT (OUTPATIENT)
Dept: HEMATOLOGY/ONCOLOGY | Facility: CLINIC | Age: 49
End: 2024-10-10

## 2024-10-10 VITALS
WEIGHT: 196 LBS | OXYGEN SATURATION: 96 % | DIASTOLIC BLOOD PRESSURE: 93 MMHG | HEART RATE: 83 BPM | RESPIRATION RATE: 20 BRPM | BODY MASS INDEX: 32.65 KG/M2 | TEMPERATURE: 98 F | SYSTOLIC BLOOD PRESSURE: 138 MMHG | HEIGHT: 65 IN

## 2024-10-10 DIAGNOSIS — C50.912 LOBULAR CARCINOMA OF LEFT BREAST: ICD-10-CM

## 2024-10-10 DIAGNOSIS — T45.1X5A CHEMOTHERAPY-INDUCED NEUROPATHY: ICD-10-CM

## 2024-10-10 DIAGNOSIS — C50.919 MALIGNANT NEOPLASM OF FEMALE BREAST, UNSPECIFIED ESTROGEN RECEPTOR STATUS, UNSPECIFIED LATERALITY, UNSPECIFIED SITE OF BREAST: Primary | ICD-10-CM

## 2024-10-10 DIAGNOSIS — Z17.0 CARCINOMA OF LEFT BREAST IN FEMALE, ESTROGEN RECEPTOR POSITIVE, UNSPECIFIED SITE OF BREAST: ICD-10-CM

## 2024-10-10 DIAGNOSIS — G62.0 CHEMOTHERAPY-INDUCED NEUROPATHY: ICD-10-CM

## 2024-10-10 DIAGNOSIS — C50.912 CARCINOMA OF LEFT BREAST IN FEMALE, ESTROGEN RECEPTOR POSITIVE, UNSPECIFIED SITE OF BREAST: ICD-10-CM

## 2024-10-10 LAB
ESTRADIOL SERPL HS-MCNC: 51 PG/ML
FSH SERPL-ACNC: 1.68 MIU/ML

## 2024-10-10 PROCEDURE — 63600175 PHARM REV CODE 636 W HCPCS: Mod: JZ,JG | Performed by: INTERNAL MEDICINE

## 2024-10-10 PROCEDURE — 83001 ASSAY OF GONADOTROPIN (FSH): CPT

## 2024-10-10 PROCEDURE — 82670 ASSAY OF TOTAL ESTRADIOL: CPT

## 2024-10-10 PROCEDURE — 36415 COLL VENOUS BLD VENIPUNCTURE: CPT

## 2024-10-10 PROCEDURE — 96402 CHEMO HORMON ANTINEOPL SQ/IM: CPT

## 2024-10-10 RX ADMIN — LEUPROLIDE ACETATE 3.75 MG: KIT at 02:10

## 2024-11-01 DIAGNOSIS — C50.912 LOBULAR CARCINOMA OF LEFT BREAST: Primary | ICD-10-CM

## 2024-11-07 ENCOUNTER — TELEPHONE (OUTPATIENT)
Dept: HEMATOLOGY/ONCOLOGY | Facility: CLINIC | Age: 49
End: 2024-11-07

## 2024-11-08 ENCOUNTER — INFUSION (OUTPATIENT)
Dept: INFUSION THERAPY | Facility: HOSPITAL | Age: 49
End: 2024-11-08
Attending: INTERNAL MEDICINE

## 2024-11-08 ENCOUNTER — OFFICE VISIT (OUTPATIENT)
Dept: HEMATOLOGY/ONCOLOGY | Facility: CLINIC | Age: 49
End: 2024-11-08

## 2024-11-08 ENCOUNTER — APPOINTMENT (OUTPATIENT)
Dept: HEMATOLOGY/ONCOLOGY | Facility: CLINIC | Age: 49
End: 2024-11-08

## 2024-11-08 VITALS
TEMPERATURE: 99 F | WEIGHT: 197.38 LBS | RESPIRATION RATE: 20 BRPM | HEIGHT: 65 IN | SYSTOLIC BLOOD PRESSURE: 124 MMHG | DIASTOLIC BLOOD PRESSURE: 79 MMHG | BODY MASS INDEX: 32.89 KG/M2 | HEART RATE: 83 BPM | OXYGEN SATURATION: 98 %

## 2024-11-08 VITALS
SYSTOLIC BLOOD PRESSURE: 117 MMHG | BODY MASS INDEX: 32.87 KG/M2 | HEART RATE: 90 BPM | TEMPERATURE: 98 F | OXYGEN SATURATION: 97 % | HEIGHT: 65 IN | RESPIRATION RATE: 20 BRPM | WEIGHT: 197.31 LBS | DIASTOLIC BLOOD PRESSURE: 82 MMHG

## 2024-11-08 DIAGNOSIS — C50.919 MALIGNANT NEOPLASM OF FEMALE BREAST, UNSPECIFIED ESTROGEN RECEPTOR STATUS, UNSPECIFIED LATERALITY, UNSPECIFIED SITE OF BREAST: Primary | ICD-10-CM

## 2024-11-08 DIAGNOSIS — Z98.890 S/P BREAST RECONSTRUCTION: ICD-10-CM

## 2024-11-08 DIAGNOSIS — Z80.3 FAMILY HISTORY OF BREAST CANCER: ICD-10-CM

## 2024-11-08 DIAGNOSIS — G62.0 CHEMOTHERAPY-INDUCED NEUROPATHY: ICD-10-CM

## 2024-11-08 DIAGNOSIS — Z90.12 HISTORY OF LEFT MASTECTOMY: ICD-10-CM

## 2024-11-08 DIAGNOSIS — C50.911 INVASIVE DUCTAL CARCINOMA OF RIGHT BREAST IN FEMALE: ICD-10-CM

## 2024-11-08 DIAGNOSIS — Z79.811 LONG TERM CURRENT USE OF AROMATASE INHIBITOR: ICD-10-CM

## 2024-11-08 DIAGNOSIS — Z90.11 H/O RIGHT MASTECTOMY: ICD-10-CM

## 2024-11-08 DIAGNOSIS — C50.912 LOBULAR CARCINOMA OF LEFT BREAST: Primary | ICD-10-CM

## 2024-11-08 DIAGNOSIS — T45.1X5A CHEMOTHERAPY-INDUCED NEUROPATHY: ICD-10-CM

## 2024-11-08 DIAGNOSIS — C50.912 LOBULAR CARCINOMA OF LEFT BREAST: ICD-10-CM

## 2024-11-08 DIAGNOSIS — H53.8 BLURRED VISION: ICD-10-CM

## 2024-11-08 DIAGNOSIS — N95.9 PREMENOPAUSAL PATIENT: ICD-10-CM

## 2024-11-08 LAB
ALBUMIN SERPL-MCNC: 3.4 G/DL (ref 3.5–5)
ALBUMIN/GLOB SERPL: 0.9 RATIO (ref 1.1–2)
ALP SERPL-CCNC: 108 UNIT/L (ref 40–150)
ALT SERPL-CCNC: 17 UNIT/L (ref 0–55)
ANION GAP SERPL CALC-SCNC: 6 MEQ/L
AST SERPL-CCNC: 19 UNIT/L (ref 5–34)
BASOPHILS # BLD AUTO: 0.02 X10(3)/MCL
BASOPHILS NFR BLD AUTO: 0.3 %
BILIRUB SERPL-MCNC: 0.5 MG/DL
BUN SERPL-MCNC: 12.1 MG/DL (ref 7–18.7)
CALCIUM SERPL-MCNC: 9.5 MG/DL (ref 8.4–10.2)
CHLORIDE SERPL-SCNC: 106 MMOL/L (ref 98–107)
CO2 SERPL-SCNC: 28 MMOL/L (ref 22–29)
CREAT SERPL-MCNC: 0.75 MG/DL (ref 0.55–1.02)
CREAT/UREA NIT SERPL: 16
EOSINOPHIL # BLD AUTO: 0.13 X10(3)/MCL (ref 0–0.9)
EOSINOPHIL NFR BLD AUTO: 2.2 %
ERYTHROCYTE [DISTWIDTH] IN BLOOD BY AUTOMATED COUNT: 12.3 % (ref 11.5–17)
GFR SERPLBLD CREATININE-BSD FMLA CKD-EPI: >60 ML/MIN/1.73/M2
GLOBULIN SER-MCNC: 4 GM/DL (ref 2.4–3.5)
GLUCOSE SERPL-MCNC: 119 MG/DL (ref 74–100)
HCT VFR BLD AUTO: 36.4 % (ref 37–47)
HGB BLD-MCNC: 12.1 G/DL (ref 12–16)
IMM GRANULOCYTES # BLD AUTO: 0.02 X10(3)/MCL (ref 0–0.04)
IMM GRANULOCYTES NFR BLD AUTO: 0.3 %
LYMPHOCYTES # BLD AUTO: 2.54 X10(3)/MCL (ref 0.6–4.6)
LYMPHOCYTES NFR BLD AUTO: 42.9 %
MAGNESIUM SERPL-MCNC: 1.9 MG/DL (ref 1.6–2.6)
MCH RBC QN AUTO: 30.9 PG (ref 27–31)
MCHC RBC AUTO-ENTMCNC: 33.2 G/DL (ref 33–36)
MCV RBC AUTO: 93.1 FL (ref 80–94)
MONOCYTES # BLD AUTO: 0.57 X10(3)/MCL (ref 0.1–1.3)
MONOCYTES NFR BLD AUTO: 9.6 %
NEUTROPHILS # BLD AUTO: 2.64 X10(3)/MCL (ref 2.1–9.2)
NEUTROPHILS NFR BLD AUTO: 44.7 %
NRBC BLD AUTO-RTO: 0 %
PLATELET # BLD AUTO: 247 X10(3)/MCL (ref 130–400)
PMV BLD AUTO: 9.9 FL (ref 7.4–10.4)
POTASSIUM SERPL-SCNC: 3.6 MMOL/L (ref 3.5–5.1)
PROT SERPL-MCNC: 7.4 GM/DL (ref 6.4–8.3)
RBC # BLD AUTO: 3.91 X10(6)/MCL (ref 4.2–5.4)
SODIUM SERPL-SCNC: 140 MMOL/L (ref 136–145)
WBC # BLD AUTO: 5.92 X10(3)/MCL (ref 4.5–11.5)

## 2024-11-08 PROCEDURE — 99214 OFFICE O/P EST MOD 30 MIN: CPT | Mod: S$PBB,,, | Performed by: NURSE PRACTITIONER

## 2024-11-08 PROCEDURE — 63600175 PHARM REV CODE 636 W HCPCS: Mod: JZ,JG | Performed by: NURSE PRACTITIONER

## 2024-11-08 PROCEDURE — 36415 COLL VENOUS BLD VENIPUNCTURE: CPT

## 2024-11-08 PROCEDURE — 83735 ASSAY OF MAGNESIUM: CPT

## 2024-11-08 PROCEDURE — 80053 COMPREHEN METABOLIC PANEL: CPT

## 2024-11-08 PROCEDURE — 99214 OFFICE O/P EST MOD 30 MIN: CPT | Mod: PBBFAC,25 | Performed by: NURSE PRACTITIONER

## 2024-11-08 PROCEDURE — 96402 CHEMO HORMON ANTINEOPL SQ/IM: CPT

## 2024-11-08 PROCEDURE — 85025 COMPLETE CBC W/AUTO DIFF WBC: CPT

## 2024-11-08 RX ADMIN — LEUPROLIDE ACETATE 3.75 MG: KIT at 11:11

## 2024-11-08 NOTE — Clinical Note
infusion today lupron injection  infusion for lupron injection no lab work on 12/6, 1/3, 1/31 fu w/np in 3 months on 2/28/24 with lab work prior to infusion for lupron injection

## 2024-11-08 NOTE — PROGRESS NOTES
Reason for Follow-up:  -metachronous hormone receptor positive invasive lobular carcinoma of left breast, S/P biopsy 03/22/2024; S/P left mastectomy 07/02/2024; pT1b pN0 (sn), grade 2, ER positive, IN negative, HER2 negative, AJCC anatomic stage IA, pathological prognostic stage I A  -history of IDC right breast, inflammatory carcinoma, cT4d cN1 M0, AJCC anatomic stage IIIB, clinical prognostic stage IIIB, S/P biopsy 10/01/2019, ER 93.2%, IN 90.3%, HER2 negative, Ki 6715-3%, S/P neoadjuvant therapy, followed by simple right mastectomy and right axillary lymph node dissection 05/27/2020, ypT2 ypN2a, 2.0 cm residual tumor, 3 lymph nodes with macrometastases, 2 lymph nodes with micrometastasis, no definite response to neoadjuvant chemotherapy, then adjuvant radiotherapy and adjuvant endocrine therapy  -Taxol induced peripheral neuropathy  -premenopausal  -family history of breast cancer and uterine cancer  -uterine fibroids    Current Treatment:  1. Lupron monthly. Started 7/28/2020.     Treatment History:  1. Neoadjuvant DDAC x4 cycles: 12/13/2019-01/24/2020  2. Neoadjuvant Taxol 80 mg/m² IV weekly x12 cycles. Started 2/7/2020. Completed 12 cycles on 4/23/2020.  3. 05/27/2020: Simple right mastectomy, right axillary lymph node dissection  3. XRT 6/2/2020 to 8/7/2020.   -03/24/2021:  Right BANG reconstruction of breast:  Right chest skin excision (no malignancy); MediPort removal   -07/14/2021:  2nd stage reconstruction: Left breast reduction, right nipple creation, fat grafting to right breast, and abdominal scar revision  -S/P left mastectomy 07/02/2024      Past medical history: Hypertension. Obesity. Tubal ligation in 1999.  -Right breast at 6:00, FNA (05/26/2016): Cyst with apocrine metaplasia  Social history: Single. Lives in [place], [place]. Has 2 children. She is an  at a nursing home. Denies history of tobacco, alcohol, or illicit drug abuse.  Family history: Maternal grandmother  experienced breast cancer twice, in her 50s, and then in her 80s. One maternal cousin experienced uterine cancer in her 40s. Another maternal cousin experienced uterine cancer in her 50s. Mother's maternal uncle experienced some kind of cancer in the 70s.  Health maintenance:  ThinPrep cervical Pap smear (07/25/2016: Negative for intraepithelial lesion or malignancy.  Bilateral diagnostic mammogram 05/15/2017, was BI-RADS Category 2, benign.  Menstrual and OB/GYN history: Menarche at age 12. First childbirth at age 18. No history of abortions or miscarriages. Used birth control pills many years back. Currently, as of 11/11/2019, regular and normal menstrual periods.        History of Present Illness:   Oncologic/Hematologic History:  Oncology History   Lobular carcinoma of left breast   4/13/2024 Initial Diagnosis    Lobular carcinoma of left breast     7/2/2024 Cancer Staged    Staging form: Breast, AJCC 8th Edition  - Pathologic stage from 7/2/2024: Stage IA (pT1b, pN0, cM0, G2, ER+, NC-, HER2-)     Invasive ductal carcinoma of right breast in female   10/1/2019 Cancer Staged    Staging form: Breast, AJCC 8th Edition  - Clinical stage from 10/1/2019: Stage IIIB (cT4d, cN1, cM0, G2, ER+, NC+, HER2-)     5/27/2020 Cancer Staged    Staging form: Breast, AJCC 8th Edition  - Pathologic stage from 5/27/2020: ypT2, pN2a, cM0, G1, ER+, NC+, HER2-     4/13/2024 Initial Diagnosis    Invasive ductal carcinoma of right breast in female     44-year-old female referred by Dr. Stephanie Silva for breast cancer.    Patient with history of inflammatory carcinoma right breast, S/P neoadjuvant chemotherapy, right simple mastectomy, right axillary lymph node dissection, adjuvant radiotherapy, adjuvant endocrine therapy, subsequently developing metachronous invasive lobular carcinoma of left breast.    Please see assessment and plan section for details    1/11/2019:  Presents for initial oncology consultation, accompanied by her mother  and daughter. Overall, feels quite well except for some fatigue. Has experienced some right upper extremity numbness, pain, and stinging sensation for last 1 month as a result of this, she is preferring left arm for some activities, like typing, etc. Appetite is good. No unintentional weight loss. No unusual headaches, focal neuro symptoms, chest pain, cough, dyspnea, fevers, chills, abdominal pain, nausea, vomiting, GI bleeding, bone pains, any urinary problems, etc. ECOG 0.    Interval History 11/8/24: Patient presents alone for scheduled breast cancer surveillance visit. Patient reports recent breast reconstruction. She report that she has to go back to complete reconstruction. Patient also report starting Tamoxifen 20mg po daily as ordered however patient says she does not like the side effects of Tamoxifen. Patient report symptoms of severe fatigue, head fogginess, blurred vision, overall generalized unwell feeling. Patient says due to symptoms patient does not take daily as prescribed. Discussed with patient the importance of daily compliance with Tamoxifen. Lab work reviewed with patient, juan. She is due to receive Lupron injection. Patient says she occassionaly has menstrual cycle but very little. Patient is currently not established with GYN nor Opthalmalogy. Patient amenable to referral to both clinics. We discussed the plan of care and follow up.     Review of Systems   Constitutional:  Positive for malaise/fatigue.   Eyes:  Positive for blurred vision.   Neurological:  Positive for weakness.   Psychiatric/Behavioral:  Positive for memory loss.    All other systems reviewed and are negative.    Physical Exam  Constitutional:       Appearance: Normal appearance.   HENT:      Head: Normocephalic.      Mouth/Throat:      Mouth: Mucous membranes are moist.   Eyes:      Pupils: Pupils are equal, round, and reactive to light.   Cardiovascular:      Rate and Rhythm: Normal rate and regular rhythm.       Pulses: Normal pulses.      Heart sounds: Normal heart sounds.   Pulmonary:      Effort: Pulmonary effort is normal.      Breath sounds: Normal breath sounds.   Abdominal:      General: Bowel sounds are normal.      Palpations: Abdomen is soft.   Musculoskeletal:         General: Normal range of motion.      Cervical back: Normal range of motion.   Skin:     General: Skin is warm and dry.      Capillary Refill: Capillary refill takes less than 2 seconds.   Neurological:      General: No focal deficit present.      Mental Status: She is alert and oriented to person, place, and time.   Psychiatric:         Attention and Perception: Attention normal.         Mood and Affect: Affect normal.         Speech: Speech normal.         Behavior: Behavior normal.         Thought Content: Thought content normal.         VITAL SIGNS:   Vitals:    11/08/24 1012   BP: 124/79   Pulse: 83   Resp: 20   Temp: 98.9 °F (37.2 °C)     Lab Results   Component Value Date    WBC 5.92 11/08/2024    RBC 3.91 (L) 11/08/2024    HGB 12.1 11/08/2024    HCT 36.4 (L) 11/08/2024    MCV 93.1 11/08/2024    MCH 30.9 11/08/2024    MCHC 33.2 11/08/2024    RDW 12.3 11/08/2024     11/08/2024    MPV 9.9 11/08/2024       CMP  Sodium   Date Value Ref Range Status   11/08/2024 140 136 - 145 mmol/L Final   07/12/2021 143 135 - 146 mmol/L Final     Potassium   Date Value Ref Range Status   11/08/2024 3.6 3.5 - 5.1 mmol/L Final   07/12/2021 3.7 3.6 - 5.2 mmol/L Final     Chloride   Date Value Ref Range Status   11/08/2024 106 98 - 107 mmol/L Final     CO2   Date Value Ref Range Status   11/08/2024 28 22 - 29 mmol/L Final     Carbon Dioxide   Date Value Ref Range Status   07/12/2021 34 (H) 24 - 32 mmol/L Final     BUN   Date Value Ref Range Status   07/12/2021 12.0 7.0 - 25.0 mg/dL Final     Blood Urea Nitrogen   Date Value Ref Range Status   11/08/2024 12.1 7.0 - 18.7 mg/dL Final     Creatinine   Date Value Ref Range Status   11/08/2024 0.75 0.55 - 1.02  mg/dL Final   07/12/2021 0.75 0.50 - 1.10 mg/dL Final     Calcium   Date Value Ref Range Status   11/08/2024 9.5 8.4 - 10.2 mg/dL Final   07/12/2021 10.6 (H) 8.4 - 10.3 mg/dL Final     Albumin   Date Value Ref Range Status   11/08/2024 3.4 (L) 3.5 - 5.0 g/dL Final   07/12/2021 3.8 3.4 - 5.0 g/dL Final     Total Bilirubin   Date Value Ref Range Status   07/12/2021 0.5 <1.3 mg/dL Final     Bilirubin Total   Date Value Ref Range Status   11/08/2024 0.5 <=1.5 mg/dL Final     ALP   Date Value Ref Range Status   11/08/2024 108 40 - 150 unit/L Final     AST   Date Value Ref Range Status   11/08/2024 19 5 - 34 unit/L Final   07/12/2021 19 <45 U/L Final     ALT   Date Value Ref Range Status   11/08/2024 17 0 - 55 unit/L Final   07/12/2021 16 <46 U/L Final     eGFR   Date Value Ref Range Status   11/08/2024 >60 mL/min/1.73/m2 Final       Assessment:  History of multifocal IDC right breast:  (Inflammatory breast carcinoma)  -at least 5 breast masses; multiple abnormal lymph nodes in axilla on mammogram and ultrasound 09/24/2019  -biopsy 10/01/2019: IDC, intermediate grade, at least 1.2 cm, along with DCIS; right axillary lymph node biopsy positive as well  -ER 93.2%; DC 90.3%; HER2 negative (IHC and FISH testing); Ki-67 15.3% (borderline positive)  -physical exam 11/11/2019:  Orange peel appearance of skin of right breast, involving <1/3 of the skin   -no palpable regional lymphadenopathy but right axillary lymph node biopsy positive on biopsy  -staging bone scan and CTs 11/2019: No metastases  -TTE 11/2019: LVEF 60%  -cT4b cN1 M0, AJCC anatomic stage IIIB, clinical prognostic stage stage IIIB  -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  -cT4d cN1 M0, AJCC anatomic stage IIIB, clinical prognostic stage stage IIIB  (Biopsy positive axillary lymph node)  -ER 93.2%; DC 90.3%; HER2 negative (IHC and FISH testing); Ki-67 15.3% (borderline positive)  -Neoadjuvant DDAC x4 (12/13/2019-01/24/2020), clinically, with good  response  -Neoadjuvant weekly Taxol x12 (02/07/2020-04/23/2020)   -05/27/2020: Simple right mastectomy, right axillary lymph node dissection:  Multiple foci of IDC, largest at least 2.0 cm, overall grade 1; DCIS also present; IDC margin 3.4 cm; DCIS margin 3.4 cm; 3 lymph nodes with macrometastases; 2 lymph nodes with micrometastasis; no definite response to neoadjuvant chemotherapy in IDC; LVI present  >>ypT2 ypN2a (2.0 cm tumor; 5 lymph nodes positive) (no definitive response to neoadjuvant chemotherapy)  ER positive (60%). CO positive (99%). HER-2 not overexpressed (score 0). Ki-67 low proliferation (2%)  -No metastasis (CT C/A/P, bone scan: 07/01/2020; brain MRI: 07/07/2020)  -Normal BMD (DEXA: 07/01/2020)  -S/P adjuvant radiotherapy to right mastectomy bed 06/02/2020-08/07/2020  -for adjuvant endocrine therapy, started on monthly Lupron shots 07/28/2020  -for adjuvant endocrine therapy, Arimidex started 09/20/2020  -03/24/2021:  Right BANG reconstruction of breast:  Right chest skin excision (no malignancy); MediPort removal   -07/14/2021:  2nd stage reconstruction: Left breast reduction, right nipple creation, fat grafting to right breast, and abdominal scar revision  -pelvic ultrasound 09/20/2021:  Uterine fibroid, 1 of them exophytic, adjacent to left ovary   -Pap smear 11/10/2021: NILM  -pelvic MRI 12/15/2021:  Exophytic uterine fibroid 4 cm between uterine fundus and left ovary, stable going back to 2019  -developed right upper extremity lymphedema post axillary lymph node dissection; used lymphedema sleeve and machine  -03/23/2022:  Right breast reconstruction revision with excision of fat necrosis and fat grafting to right breast  -DEXA scan 07/07/2020: Normal BMD but decreased  -pelvic MRI 12/09/2022:  Stable uterine fibroids (exophytic mass towards the fundus up to 3.5 cm, stable; 1.5 cm myometrial mass, stable)  -pelvic ultrasound 07/26/2023:  Stable uterine fibroids  >>>  Metachronous hormone  receptor positive invasive lobular carcinoma of left breast:  (failed adjuvant endocrine therapy with Lupron +Arimidex, which was started July/September 2020)  -mammogram/ultrasound 03/13/2024:  Left breast BI-RADS 4: Left upper outer breast 7 mm enhancing focal asymmetry, lesion of concern   -stereotactic biopsy left upper outer breast 7 mm lesion 03/22/2024:  Invasive lobular carcinoma, largest focus 2.1 mm   -ER 25%; NV 0%; HER2 score 0; Ki-67 low proliferation (1%)  -NGS testing on left breast biopsy performed 03/22/2024:  No genetic abnormalities detected for RNA gene fusions; nondiagnostic study (unable to amplified DNA); positive PD-L1 expression for Keytruda based on CPS 5; no PD-L1 expression based on TPS <1%  -CTs C/A/P 05/21/2024: No metastases  -bone scan 05/28/2024: No metastases  -DEXA scan 05/28/2024:  Normal BMD since 07/13/2023 DEXA scan  -CT head without contrast 05/29/2024:  No metastases; no meningioma  -07/02/2024:  ([facility]; Remi Laughlin MD):  Left breast mastectomy with a tissue expander placement and left SLN biopsy: Invasive lobular carcinoma, 9 mm; margins negative; upper outer quadrant; overall grade 2; no DCIS; no LCIS; no LVI; all margins negative for invasive carcinoma (distance from invasive carcinoma to closest margin, 4 mm); # of lymph nodes examined, 1; # of SLN examined, 1; all regional lymph nodes negative for tumor (ER positive, NV negative, HER2 negative)  >>> pT1b pN0 (sn), grade 2, ER positive, NV negative, HER2 negative, AJCC anatomic stage IA, pathological prognostic stage I A  -07/15/2024:  11:24 a.m.:  Before monthly Lupron shot:  Estradiol <24 pg/mL; FSH 3.06 mIU /ml (premenopausal)  -07/15/2024:  Monthly Lupron given at 11:41 a.m.  -Oncotype DX breast recurrence score: 13      Molecular testing:  -genetic testing 12/02/2019:  Essentially negative  -liquid biopsy 04/15/2024: Negative for any relevant findings  -NGS testing on left breast biopsy performed  03/22/2024:  No genetic abnormalities detected for RNA gene fusions; nondiagnostic study (unable to amplified DNA); positive PD-L1 expression for Keytruda based on CPS 5; no PD-L1 expression based on TPS <1%      Taxol induced peripheral neuropathy:  -Gabapentin started 02/19/2020  -Cymbalta 60 mg p.o. nightly started 03/18/2020      Premenopausal as of 11/11/2019 by history:  -as of 07/09/2020, no menstrual cycles since December 2019 (after starting neoadjuvant chemotherapy)  -premenopausal as of 07/15/2024, as well      Family history of breast cancer and uterine cancers  -12/02/2019: Genetic testing: Three (3) variants of uncertain significance (VUS): AXIN2 c.1573C>G (p.Nad612Xdv), msh3 c.2005C>T (p.Khe528Vka), and POLE c.1007A>G (p.Tyy344Qnv)      Exophytic fibroid versus left adnexal mass (4 cm, solid) on CT 11/22/2019:  -12/03/2019: Pelvic ultrasound: No discrete sonographic pathology  -pelvic ultrasound 09/20/2021:  Uterine fibroid, 1 of them exophytic, adjacent to left ovary   -Pap smear 11/10/2021: NILM  -pelvic MRI 12/15/2021:  Exophytic uterine fibroid 4 cm between uterine fundus and left ovary, stable going back to 2019  -pelvic MRI 12/09/2022:  Stable uterine fibroids (exophytic mass towards the fundus up to 3.5 cm, stable; 1.5 cm myometrial mass, stable)  -pelvic ultrasound 07/26/2023:  Stable uterine fibroids      Plan:  History of multifocal IDC right breast    recurrence score <15; therefore, adjuvant therapy recommendation:  Adjuvant endocrine therapy +/- ovarian suppression/ablation  -no indication of adjuvant chemotherapy or radiotherapy  -continue monthly Lupron for ovarian suppression  -start tamoxifen 20 mg p.o. q.day (failed OFS/Arimidex in adjuvant setting; we have discontinued Arimidex)  -premenopausal patient  -if becomes postmenopausal after 5 years of adjuvant tamoxifen, then aromatase inhibitor for 5 years (category 1 option); or, continue tamoxifen for an additional 5 years to complete  10 years  -if remains premenopausal after 5 years of tamoxifen, then, consider tamoxifen for an additional 5 years complete 10 years, or no further endocrine therapy  -no indication of adjuvant radiotherapy  -continue monthly Lupron for ovarian suppression (maximum, until 07/2025, i.e., 07/28/2020-07/2025)  -ovarian function suppression (monthly Lupron; to stop 07/2025 after 5 years of OFS)    -per SOFT and TEXT trials, optimal duration of ovarian suppression therapy is 5 years (07/28/2020-07/2025); no efficacy or safety data to support prolonged ovarian function suppression  -premenopausal patients wishing to continue adjuvant endocrine therapy after ovarian function suppression is stopped, should use tamoxifen  -menopausal status can not be determined while receiving OFS  -check FSH and serum estradiol prior to next dose of Lupron for ovarian function assessment (premenopausal as of 07/15/2024)  -since she has failed adjuvant ovarian suppression therapy +Arimidex, therefore, we have discontinued Arimidex; we have switched to ovarian suppression therapy + tamoxifen (see above)    Continue Tamoxifen 20mg po daily  Okay to proceed with Lupron every 28 days due today  REturn to infusion for Lupron injection every 4 weeks no lab work on 12/6, 1/3, 1/31  fu w/np in 3 months on 2/28/25 with lab work prior to Lupron injection  referral to GYN for establishment and annual pelvic exam/pap smear  referral to opthalmalogy for yearly eye exam  DEXA scan May 2026  Follow-up with NP in 3 months  -In the absence of clinical signs or symptoms suggestive of recurrent disease, there is no indication for laboratory or imaging studies for metastasis screening;  -Active lifestyle, healthy diet, limited alcohol intake, and achieving and maintaining an ideal body weight (20-25 BMI) may lead to optimal breast cancer outcomes  -she has reconstructed right breast; routine imaging of reconstructed breasts is not indicated      Monitoring  parameters with the tamoxifen:  CBC with platelets, serum calcium, liver function tests;  triglycerides and cholesterol (in patients with preexisting hyperlipidemias);  pregnancy test (prior to treatment in females of reproductive potential);  monitor for and promptly evaluate abnormal vaginal bleeding, menstrual irregularities, changes in vaginal discharge, or pelvic pain/pressure;  gynecologic exam (baseline and annually, continuing after discontinuation of therapy),  signs/symptoms of thromboembolism (eg, stroke, DVT [leg swelling, tenderness], or PE [shortness of breath]);  ophthalmic exam (if vision problem or cataracts);  bone mineral density (premenopausal women). Monitor adherence.  -Abnormal vaginal bleeding on tamoxifen therapy should prompt evaluation for endometrial cancer if uterus intact.   If anticipated elective surgery while on tamoxifen, then consider discontinuing tamoxifen prior to elective surgery, and resume tamoxifen postoperatively when ambulation is normal.   If DVT, PE, CVA, or prolonged immobilization while on tamoxifen, then discontinue tamoxifen, and treat underlying condition.     Tamoxifen:  ALERT: US Boxed Warning:  Uterine malignancies and thromboembolic events:  Serious and life-threatening events from the use of tamoxifen include uterine malignancies, stroke, and pulmonary embolism. Fatal cases of each type of event have occurred.  Incidence rates per 1,000 woman-years:  Endometrial adenocarcinoma: 2.20 for tamoxifen versus 0.71 for placebo  Uterine sarcoma: 0.17 for tamoxifen versus 0.04 for placebo  Stroke: 1.43 for tamoxifen versus 1.00 for placebo.  Pulmonary embolism: 0.75 for tamoxifen versus 0.25 for placebo  For most patients already diagnosed with breast cancer, the benefits of tamoxifen outweigh its risks.     Ocular effects with the tamoxifen:  Ocular effects (including corneal deposits, cataract, epithelial keratopathy, macular edema, maculopathy, optic neuritis,  retinal thrombosis, retinopathy, and vision color changes) have been reported in patients taking tamoxifen. An increased need for cataract surgery has also been reported.  Onset: Variable; may occur at any time after tamoxifen initiation and may last after tamoxifen discontinuation. Most reports are after 2 years of treatment,.    Thromboembolic events with tamoxifen:  Serious and life-threatening thromboembolic events (some fatal) have occurred with tamoxifen, including cerebrovascular accident (stroke), deep vein thrombosis (DVT), and pulmonary embolism (PE). Risk normalized after discontinuation of therapy.  Onset: Delayed; PE events occurred at an average of 27 months after tamoxifen initiation (range: 2 to 60 months). DVT occurred at an average of 19 months (range: 2 to 57 months). Stroke occurred at an average of 30 months (range: 1 to 63 months).  Risk factors:  Age> 49 years, 1st 24 months abuse, high BMI (=/> 25), history of surgery/fracture/immobilization, factor 5 Leiden mutation, smoking, personal history/family history of venous thromboembolism, hypertension, dyslipidemia    Uterine malignancies:  Uterine malignancies have been reported with tamoxifen, including fatal cases of endometrial carcinoma and uterine carcinoma (sarcoma). The increased risk of endometrial cancer continues during therapy with tamoxifen but decreases after treatment discontinuation.  The estrogenic effects of tamoxifen are associated with endometrial polyp formation, endometrial hyperplasia, and uterine fibroids which lead to increased risk of endometrial carcinoma and uterine sarcoma  Onset: Delayed; endometrial carcinoma has been reported as early as 6 months after initiation to 6 years after discontinuing tamoxifen. Uterine sarcomas have been reported to develop a median of 6 to 9 years after initiation of tamoxifen, with a case developing 20 years after initiation of therapy.  Risk factors:  Cumulative dose.  Prior estrogen  replacement therapy.  Obesity.  History of endometrial abnormalities.  Duration of therapy =/> 2 years.     Breast cancer surveillance:  -History and physical exam 1-4 times per year for 5 years, then annually;  -she is S/P bilateral mastectomy, therefore, no role of surveillance mammography  -she has reconstructed right breast; routine imaging of reconstructed breasts is not indicated  -as of 08/15/2024, she is planning to have left breast reconstructed as well  -Woman or tamoxifen: Annual GYN assessment if uterus present;  -In the absence of clinical signs or symptoms suggestive of recurrent disease, there is no indication for laboratory or imaging studies for metastasis screening;  -Active lifestyle, healthy diet, limited alcohol intake, and achieving and maintaining an ideal body weight (20-25 BMI) may lead to optimal breast cancer outcomes.        Taxol induced peripheral neuropathy  -gabapentin started 02/19/2020  -Cymbalta started 03/18/2020      Above discussed at length with the patient.  All questions answered.    Plan of adjuvant therapy discussed.  Potential side effects of tamoxifen discussed.   She understands and agrees with this plan.

## 2024-11-08 NOTE — NURSING
[id number]  Pt did labs, saw A Rosalino DONG, and is here for lupron q 4 week injection.  Pt states her left breast is sore but not painful from recent procedure.  Pt also reports fatigue.

## 2024-11-25 RX ORDER — LISINOPRIL AND HYDROCHLOROTHIAZIDE 10; 12.5 MG/1; MG/1
1 TABLET ORAL DAILY
Qty: 90 TABLET | Refills: 3 | Status: SHIPPED | OUTPATIENT
Start: 2024-11-25 | End: 2025-11-20

## 2024-12-06 ENCOUNTER — INFUSION (OUTPATIENT)
Dept: INFUSION THERAPY | Facility: HOSPITAL | Age: 49
End: 2024-12-06
Attending: INTERNAL MEDICINE

## 2024-12-06 VITALS
BODY MASS INDEX: 33.1 KG/M2 | WEIGHT: 198.69 LBS | RESPIRATION RATE: 18 BRPM | DIASTOLIC BLOOD PRESSURE: 96 MMHG | HEIGHT: 65 IN | HEART RATE: 89 BPM | OXYGEN SATURATION: 99 % | SYSTOLIC BLOOD PRESSURE: 143 MMHG | TEMPERATURE: 98 F

## 2024-12-06 DIAGNOSIS — C50.919 MALIGNANT NEOPLASM OF FEMALE BREAST, UNSPECIFIED ESTROGEN RECEPTOR STATUS, UNSPECIFIED LATERALITY, UNSPECIFIED SITE OF BREAST: Primary | ICD-10-CM

## 2024-12-06 PROCEDURE — 63600175 PHARM REV CODE 636 W HCPCS: Mod: JZ,JG | Performed by: NURSE PRACTITIONER

## 2024-12-06 PROCEDURE — 96402 CHEMO HORMON ANTINEOPL SQ/IM: CPT

## 2024-12-06 RX ADMIN — LEUPROLIDE ACETATE 3.75 MG: KIT at 12:12

## 2025-01-03 ENCOUNTER — DOCUMENTATION ONLY (OUTPATIENT)
Dept: INFUSION THERAPY | Facility: HOSPITAL | Age: 50
End: 2025-01-03

## 2025-01-03 NOTE — PROGRESS NOTES
Pt called to say she was having car trouble and would not be able to make it in today.  Pt has been r/s for 1/6/25.

## 2025-01-06 ENCOUNTER — INFUSION (OUTPATIENT)
Dept: INFUSION THERAPY | Facility: HOSPITAL | Age: 50
End: 2025-01-06
Attending: INTERNAL MEDICINE

## 2025-01-06 VITALS
WEIGHT: 198.69 LBS | TEMPERATURE: 99 F | RESPIRATION RATE: 20 BRPM | BODY MASS INDEX: 33.07 KG/M2 | HEART RATE: 89 BPM | SYSTOLIC BLOOD PRESSURE: 149 MMHG | OXYGEN SATURATION: 99 % | DIASTOLIC BLOOD PRESSURE: 92 MMHG

## 2025-01-06 DIAGNOSIS — C50.919 MALIGNANT NEOPLASM OF FEMALE BREAST, UNSPECIFIED ESTROGEN RECEPTOR STATUS, UNSPECIFIED LATERALITY, UNSPECIFIED SITE OF BREAST: Primary | ICD-10-CM

## 2025-01-06 PROCEDURE — 96402 CHEMO HORMON ANTINEOPL SQ/IM: CPT

## 2025-01-06 PROCEDURE — 63600175 PHARM REV CODE 636 W HCPCS: Mod: JZ,TB | Performed by: INTERNAL MEDICINE

## 2025-01-06 RX ADMIN — LEUPROLIDE ACETATE 3.75 MG: KIT at 10:01

## 2025-01-14 ENCOUNTER — OFFICE VISIT (OUTPATIENT)
Dept: GYNECOLOGY | Facility: CLINIC | Age: 50
End: 2025-01-14

## 2025-01-14 VITALS
TEMPERATURE: 99 F | DIASTOLIC BLOOD PRESSURE: 84 MMHG | OXYGEN SATURATION: 100 % | BODY MASS INDEX: 33.72 KG/M2 | RESPIRATION RATE: 20 BRPM | HEART RATE: 95 BPM | WEIGHT: 202.38 LBS | HEIGHT: 65 IN | SYSTOLIC BLOOD PRESSURE: 160 MMHG

## 2025-01-14 DIAGNOSIS — N89.8 VAGINAL DISCHARGE: ICD-10-CM

## 2025-01-14 DIAGNOSIS — Z12.4 PAP SMEAR FOR CERVICAL CANCER SCREENING: Primary | ICD-10-CM

## 2025-01-14 LAB
C TRACH DNA SPEC QL NAA+PROBE: NOT DETECTED
CLUE CELLS VAG QL WET PREP: ABNORMAL
N GONORRHOEA DNA SPEC QL NAA+PROBE: NOT DETECTED
SOURCE (OHS): NORMAL
T VAGINALIS VAG QL WET PREP: ABNORMAL
WBC #/AREA VAG WET PREP: ABNORMAL
YEAST SPEC QL WET PREP: ABNORMAL

## 2025-01-14 PROCEDURE — 87624 HPV HI-RISK TYP POOLED RSLT: CPT | Performed by: NURSE PRACTITIONER

## 2025-01-14 PROCEDURE — 99214 OFFICE O/P EST MOD 30 MIN: CPT | Mod: PBBFAC | Performed by: NURSE PRACTITIONER

## 2025-01-14 PROCEDURE — 99396 PREV VISIT EST AGE 40-64: CPT | Mod: S$PBB,,, | Performed by: NURSE PRACTITIONER

## 2025-01-14 PROCEDURE — 87491 CHLMYD TRACH DNA AMP PROBE: CPT | Performed by: NURSE PRACTITIONER

## 2025-01-14 PROCEDURE — 88174 CYTOPATH C/V AUTO IN FLUID: CPT | Performed by: NURSE PRACTITIONER

## 2025-01-14 PROCEDURE — 87210 SMEAR WET MOUNT SALINE/INK: CPT | Performed by: NURSE PRACTITIONER

## 2025-01-14 NOTE — PROGRESS NOTES
[facility] -  Gynecology / Women's Health Clinic      Subjective:       Patient ID: Jeni Mejia is a 49 y.o. female.    Chief Complaint:  Gynecologic Exam      History of Present Illness  The patient  here for annual exam. Denies history of abnormal paps. Last pap -NIL and HPV neg. Pt is has undergone medically induced amenorrhea. Pt with multifocal R breast IDC s/p R BANG and L breast reduction (3/24/21), R breast recon revision (21 and 3/23/22). She has recent diagnosis of of L breast invasive lobular carcinoma and underwent L mastectomy, SLNB and subsequent tissue expander placement on the left on 24. Hx of Tamoxifen and followed by oncology. Denies breast or urinary complaints. Denies abnormal bleeding or discharge. Hx of uterine fibroids. Pt was seen by GYN in  for pelvic pain and pelvic floor dysfunction and was referred for PFT. Pt states she did not complete d/t loss of insurance. Not interested in completing at this time. Pt reports no STIs in the past and no concerns. Contraception consist of TL. Denies tobacco use. Dep. screening 0. Denies fly hx of ovarian or colon cancer. Family hx of breast in MGM/PGM and uterine cancer maternal cousin.     GYN & OB History  No LMP recorded. (Menstrual status: Other).   Date of Last Pap: 11/10/2021    Review of patient's allergies indicates:   Allergen Reactions    Sulfa (sulfonamide antibiotics) Itching and Rash     Other reaction(s): RASH, DIFFICULTY BREATHIN    Sulfamethoxazole-trimethoprim Itching and Rash     rash       Past Medical History:   Diagnosis Date    Breast cancer     HTN (hypertension)      OB History    Para Term  AB Living   2 2   2   2   SAB IAB Ectopic Multiple Live Births           2      # Outcome Date GA Lbr Cem/2nd Weight Sex Type Anes PTL Lv   2      M Vag-Spont   DENIS   1      F Vag-Spont   DENIS        Review of Systems  Review of Systems    Negative except for  "pertinent findings for positives per HPI     Objective:    Physical Exam    BP (!) 160/84 (BP Location: Left leg, Patient Position: Sitting)   Pulse 95   Temp 98.8 °F (37.1 °C) (Oral)   Resp 20   Ht 5' 5" (1.651 m)   Wt 91.8 kg (202 lb 6.4 oz)   SpO2 100%   BMI 33.68 kg/m²   GENERAL: Well-developed female. No acute distress.    SKIN: Normal to inspection, warm and intact.  BREASTS: No rashes or erythema. RT breast flap, Lt breast tissue expander palpated.  VULVA: General appearance normal; external genitalia with no lesions or erythema.  VAGINA: Mucosa/vaginal vault pink, white discharge, no lesions.  CERVIX: Pink, stenotic appearing os, no erythema or abnormal discharge.  BIMANUAL EXAM: Limited exam d/t body habitus. The uterus is non tender. Irvin adnexa reveal no tenderness.  PSYCHIATRIC: Patient is oriented to person, place, and time. Mood and affect are normal.    Assessment:         ICD-10-CM ICD-9-CM   1. Pap smear for cervical cancer screening  Z12.4 V76.2   2. Vaginal discharge  N89.8 623.5     Plan:   Jeni was seen today for gynecologic exam.    Diagnoses and all orders for this visit:    Pap smear for cervical cancer screening  -     Ambulatory referral/consult to Gynecology  -     Liquid-Based Pap Smear, Screening    Vaginal discharge  -     Chlamydia/GC, PCR  -     Wet Prep, Genital    Pap today  Wet prep and g/c for discharge.  Follow up in about 1 year (around 1/14/2026) for annual exam.    "

## 2025-01-20 LAB
HIGH RISK HPV: NEGATIVE
PSYCHE PATHOLOGY RESULT: NORMAL

## 2025-01-31 ENCOUNTER — INFUSION (OUTPATIENT)
Dept: INFUSION THERAPY | Facility: HOSPITAL | Age: 50
End: 2025-01-31
Attending: INTERNAL MEDICINE

## 2025-01-31 VITALS
TEMPERATURE: 98 F | WEIGHT: 202.13 LBS | RESPIRATION RATE: 20 BRPM | DIASTOLIC BLOOD PRESSURE: 92 MMHG | HEART RATE: 88 BPM | HEIGHT: 65 IN | SYSTOLIC BLOOD PRESSURE: 116 MMHG | BODY MASS INDEX: 33.67 KG/M2 | OXYGEN SATURATION: 98 %

## 2025-01-31 DIAGNOSIS — C50.919 MALIGNANT NEOPLASM OF FEMALE BREAST, UNSPECIFIED ESTROGEN RECEPTOR STATUS, UNSPECIFIED LATERALITY, UNSPECIFIED SITE OF BREAST: Primary | ICD-10-CM

## 2025-01-31 PROCEDURE — 63600175 PHARM REV CODE 636 W HCPCS: Mod: JZ,TB | Performed by: INTERNAL MEDICINE

## 2025-01-31 PROCEDURE — 96402 CHEMO HORMON ANTINEOPL SQ/IM: CPT

## 2025-01-31 RX ADMIN — LEUPROLIDE ACETATE 3.75 MG: KIT at 01:01

## 2025-02-26 DIAGNOSIS — C50.912 LOBULAR CARCINOMA OF LEFT BREAST: Primary | ICD-10-CM

## 2025-02-27 ENCOUNTER — INFUSION (OUTPATIENT)
Dept: INFUSION THERAPY | Facility: HOSPITAL | Age: 50
End: 2025-02-27
Attending: INTERNAL MEDICINE

## 2025-02-27 ENCOUNTER — OFFICE VISIT (OUTPATIENT)
Dept: HEMATOLOGY/ONCOLOGY | Facility: CLINIC | Age: 50
End: 2025-02-27

## 2025-02-27 ENCOUNTER — LAB VISIT (OUTPATIENT)
Dept: HEMATOLOGY/ONCOLOGY | Facility: CLINIC | Age: 50
End: 2025-02-27

## 2025-02-27 VITALS
SYSTOLIC BLOOD PRESSURE: 115 MMHG | RESPIRATION RATE: 20 BRPM | HEART RATE: 84 BPM | TEMPERATURE: 98 F | DIASTOLIC BLOOD PRESSURE: 80 MMHG | OXYGEN SATURATION: 98 %

## 2025-02-27 VITALS
OXYGEN SATURATION: 99 % | RESPIRATION RATE: 18 BRPM | HEART RATE: 81 BPM | HEIGHT: 65 IN | WEIGHT: 201.38 LBS | BODY MASS INDEX: 33.55 KG/M2 | DIASTOLIC BLOOD PRESSURE: 88 MMHG | TEMPERATURE: 99 F | SYSTOLIC BLOOD PRESSURE: 120 MMHG

## 2025-02-27 DIAGNOSIS — Z79.899 MEDICATION MANAGEMENT: ICD-10-CM

## 2025-02-27 DIAGNOSIS — D84.821 IMMUNODEFICIENCY DUE TO CHEMOTHERAPY: ICD-10-CM

## 2025-02-27 DIAGNOSIS — C50.919 MALIGNANT NEOPLASM OF FEMALE BREAST, UNSPECIFIED ESTROGEN RECEPTOR STATUS, UNSPECIFIED LATERALITY, UNSPECIFIED SITE OF BREAST: Primary | ICD-10-CM

## 2025-02-27 DIAGNOSIS — C50.912 LOBULAR CARCINOMA OF LEFT BREAST: ICD-10-CM

## 2025-02-27 DIAGNOSIS — H53.8 BLURRED VISION: ICD-10-CM

## 2025-02-27 DIAGNOSIS — C50.911 INVASIVE DUCTAL CARCINOMA OF RIGHT BREAST IN FEMALE: Primary | ICD-10-CM

## 2025-02-27 DIAGNOSIS — Z79.899 IMMUNODEFICIENCY DUE TO CHEMOTHERAPY: ICD-10-CM

## 2025-02-27 DIAGNOSIS — Z79.810 LONG-TERM CURRENT USE OF TAMOXIFEN: ICD-10-CM

## 2025-02-27 DIAGNOSIS — T45.1X5A IMMUNODEFICIENCY DUE TO CHEMOTHERAPY: ICD-10-CM

## 2025-02-27 LAB
ALBUMIN SERPL-MCNC: 3.3 G/DL (ref 3.5–5)
ALBUMIN/GLOB SERPL: 0.7 RATIO (ref 1.1–2)
ALP SERPL-CCNC: 87 UNIT/L (ref 40–150)
ALT SERPL-CCNC: 20 UNIT/L (ref 0–55)
ANION GAP SERPL CALC-SCNC: 7 MEQ/L
AST SERPL-CCNC: 19 UNIT/L (ref 5–34)
BASOPHILS # BLD AUTO: 0.03 X10(3)/MCL
BASOPHILS NFR BLD AUTO: 0.5 %
BILIRUB SERPL-MCNC: 0.4 MG/DL
BUN SERPL-MCNC: 13.2 MG/DL (ref 7–18.7)
CALCIUM SERPL-MCNC: 9.4 MG/DL (ref 8.4–10.2)
CHLORIDE SERPL-SCNC: 104 MMOL/L (ref 98–107)
CO2 SERPL-SCNC: 29 MMOL/L (ref 22–29)
CREAT SERPL-MCNC: 0.86 MG/DL (ref 0.55–1.02)
CREAT/UREA NIT SERPL: 15
EOSINOPHIL # BLD AUTO: 0.2 X10(3)/MCL (ref 0–0.9)
EOSINOPHIL NFR BLD AUTO: 3 %
ERYTHROCYTE [DISTWIDTH] IN BLOOD BY AUTOMATED COUNT: 11.9 % (ref 11.5–17)
GFR SERPLBLD CREATININE-BSD FMLA CKD-EPI: >60 ML/MIN/1.73/M2
GLOBULIN SER-MCNC: 4.5 GM/DL (ref 2.4–3.5)
GLUCOSE SERPL-MCNC: 157 MG/DL (ref 74–100)
HCT VFR BLD AUTO: 37.4 % (ref 37–47)
HGB BLD-MCNC: 12.2 G/DL (ref 12–16)
IMM GRANULOCYTES # BLD AUTO: 0.04 X10(3)/MCL (ref 0–0.04)
IMM GRANULOCYTES NFR BLD AUTO: 0.6 %
LYMPHOCYTES # BLD AUTO: 2.65 X10(3)/MCL (ref 0.6–4.6)
LYMPHOCYTES NFR BLD AUTO: 40 %
MAGNESIUM SERPL-MCNC: 1.9 MG/DL (ref 1.6–2.6)
MCH RBC QN AUTO: 31.2 PG (ref 27–31)
MCHC RBC AUTO-ENTMCNC: 32.6 G/DL (ref 33–36)
MCV RBC AUTO: 95.7 FL (ref 80–94)
MONOCYTES # BLD AUTO: 0.5 X10(3)/MCL (ref 0.1–1.3)
MONOCYTES NFR BLD AUTO: 7.6 %
NEUTROPHILS # BLD AUTO: 3.2 X10(3)/MCL (ref 2.1–9.2)
NEUTROPHILS NFR BLD AUTO: 48.3 %
NRBC BLD AUTO-RTO: 0 %
PLATELET # BLD AUTO: 255 X10(3)/MCL (ref 130–400)
PMV BLD AUTO: 9.4 FL (ref 7.4–10.4)
POTASSIUM SERPL-SCNC: 3.5 MMOL/L (ref 3.5–5.1)
PROT SERPL-MCNC: 7.8 GM/DL (ref 6.4–8.3)
RBC # BLD AUTO: 3.91 X10(6)/MCL (ref 4.2–5.4)
SODIUM SERPL-SCNC: 140 MMOL/L (ref 136–145)
WBC # BLD AUTO: 6.62 X10(3)/MCL (ref 4.5–11.5)

## 2025-02-27 PROCEDURE — 83735 ASSAY OF MAGNESIUM: CPT

## 2025-02-27 PROCEDURE — 96402 CHEMO HORMON ANTINEOPL SQ/IM: CPT

## 2025-02-27 PROCEDURE — 85025 COMPLETE CBC W/AUTO DIFF WBC: CPT

## 2025-02-27 PROCEDURE — 99214 OFFICE O/P EST MOD 30 MIN: CPT | Mod: PBBFAC,25

## 2025-02-27 PROCEDURE — 99213 OFFICE O/P EST LOW 20 MIN: CPT | Mod: S$PBB,,,

## 2025-02-27 PROCEDURE — 80053 COMPREHEN METABOLIC PANEL: CPT

## 2025-02-27 PROCEDURE — 36415 COLL VENOUS BLD VENIPUNCTURE: CPT

## 2025-02-27 PROCEDURE — 63600175 PHARM REV CODE 636 W HCPCS: Mod: JZ,TB

## 2025-02-27 RX ADMIN — LEUPROLIDE ACETATE 3.75 MG: KIT at 10:02

## 2025-02-27 NOTE — PROGRESS NOTES
Reason for Follow-up:  -metachronous hormone receptor positive invasive lobular carcinoma of left breast, S/P biopsy 03/22/2024; S/P left mastectomy 07/02/2024; pT1b pN0 (sn), grade 2, ER positive, NV negative, HER2 negative, AJCC anatomic stage IA, pathological prognostic stage I A  -history of IDC right breast, inflammatory carcinoma, cT4d cN1 M0, AJCC anatomic stage IIIB, clinical prognostic stage IIIB, S/P biopsy 10/01/2019, ER 93.2%, NV 90.3%, HER2 negative, Ki 6715-3%, S/P neoadjuvant therapy, followed by simple right mastectomy and right axillary lymph node dissection 05/27/2020, ypT2 ypN2a, 2.0 cm residual tumor, 3 lymph nodes with macrometastases, 2 lymph nodes with micrometastasis, no definite response to neoadjuvant chemotherapy, then adjuvant radiotherapy and adjuvant endocrine therapy  -Taxol induced peripheral neuropathy  -premenopausal  -family history of breast cancer and uterine cancer  -uterine fibroids    Current Treatment:  1. Lupron monthly. Started 7/28/2020.     Treatment History:  1. Neoadjuvant DDAC x4 cycles: 12/13/2019-01/24/2020  2. Neoadjuvant Taxol 80 mg/m² IV weekly x12 cycles. Started 2/7/2020. Completed 12 cycles on 4/23/2020.  3. 05/27/2020: Simple right mastectomy, right axillary lymph node dissection  3. XRT 6/2/2020 to 8/7/2020.   -03/24/2021:  Right BANG reconstruction of breast:  Right chest skin excision (no malignancy); MediPort removal   -07/14/2021:  2nd stage reconstruction: Left breast reduction, right nipple creation, fat grafting to right breast, and abdominal scar revision  -S/P left mastectomy 07/02/2024      Past medical history: Hypertension. Obesity. Tubal ligation in 1999.  -Right breast at 6:00, FNA (05/26/2016): Cyst with apocrine metaplasia  Social history: Single. Lives in [place], [place]. Has 2 children. She is an  at a nursing home. Denies history of tobacco, alcohol, or illicit drug abuse.  Family history: Maternal grandmother  experienced breast cancer twice, in her 50s, and then in her 80s. One maternal cousin experienced uterine cancer in her 40s. Another maternal cousin experienced uterine cancer in her 50s. Mother's maternal uncle experienced some kind of cancer in the 70s.  Health maintenance:  ThinPrep cervical Pap smear (07/25/2016: Negative for intraepithelial lesion or malignancy.  Bilateral diagnostic mammogram 05/15/2017, was BI-RADS Category 2, benign.  Menstrual and OB/GYN history: Menarche at age 12. First childbirth at age 18. No history of abortions or miscarriages. Used birth control pills many years back. Currently, as of 11/11/2019, regular and normal menstrual periods.      History of Present Illness:   Oncologic/Hematologic History:  Oncology History   Lobular carcinoma of left breast   4/13/2024 Initial Diagnosis    Lobular carcinoma of left breast     7/2/2024 Cancer Staged    Staging form: Breast, AJCC 8th Edition  - Pathologic stage from 7/2/2024: Stage IA (pT1b, pN0, cM0, G2, ER+, WY-, HER2-)     Invasive ductal carcinoma of right breast in female   10/1/2019 Cancer Staged    Staging form: Breast, AJCC 8th Edition  - Clinical stage from 10/1/2019: Stage IIIB (cT4d, cN1, cM0, G2, ER+, WY+, HER2-)     5/27/2020 Cancer Staged    Staging form: Breast, AJCC 8th Edition  - Pathologic stage from 5/27/2020: ypT2, pN2a, cM0, G1, ER+, WY+, HER2-     4/13/2024 Initial Diagnosis    Invasive ductal carcinoma of right breast in female     44-year-old female referred by Dr. Stephanie Silva for breast cancer.    Patient with history of inflammatory carcinoma right breast, S/P neoadjuvant chemotherapy, right simple mastectomy, right axillary lymph node dissection, adjuvant radiotherapy, adjuvant endocrine therapy, subsequently developing metachronous invasive lobular carcinoma of left breast.    Please see assessment and plan section for details    1/11/2019:  Presents for initial oncology consultation, accompanied by her mother  and daughter. Overall, feels quite well except for some fatigue. Has experienced some right upper extremity numbness, pain, and stinging sensation for last 1 month as a result of this, she is preferring left arm for some activities, like typing, etc. Appetite is good. No unintentional weight loss. No unusual headaches, focal neuro symptoms, chest pain, cough, dyspnea, fevers, chills, abdominal pain, nausea, vomiting, GI bleeding, bone pains, any urinary problems, etc. ECOG 0.    Interval History 2/27/25:   Patient presents alone for scheduled breast cancer surveillance visit. Patient reports recent breast reconstruction. She report that she has to go back to complete reconstruction. Patient reports being compliant with Tamoxifen 20mg po daily.denies any side effects associated with the medication. She reports being up to uptodate with her GYN visit. She states that she has not been seen by opth. She reports noticing increasing fatigue. Denies any blurry vision, new or worsening visual changes, abnormal bleeding, mood swings, headaches. Lab work reviewed with patient, stable. She is due to receive Lupron injection. Patient says she occassionaly has menstrual cycle but very little. Patient is currently not established with GYN nor Opthalmalogy. Patient amenable to referral to both clinics. We discussed the plan of care and follow up.     Review of Systems   Constitutional:  Positive for malaise/fatigue.   Eyes:  Positive for blurred vision.   All other systems reviewed and are negative.    Physical Exam  Constitutional:       Appearance: Normal appearance.   HENT:      Head: Normocephalic.      Mouth/Throat:      Mouth: Mucous membranes are moist.   Eyes:      Pupils: Pupils are equal, round, and reactive to light.   Cardiovascular:      Rate and Rhythm: Normal rate and regular rhythm.      Pulses: Normal pulses.      Heart sounds: Normal heart sounds.   Pulmonary:      Effort: Pulmonary effort is normal.      Breath  sounds: Normal breath sounds.   Abdominal:      General: Bowel sounds are normal.      Palpations: Abdomen is soft.   Musculoskeletal:         General: Normal range of motion.      Cervical back: Normal range of motion.   Skin:     General: Skin is warm and dry.      Capillary Refill: Capillary refill takes less than 2 seconds.   Neurological:      General: No focal deficit present.      Mental Status: She is alert and oriented to person, place, and time.   Psychiatric:         Attention and Perception: Attention normal.         Mood and Affect: Affect normal.         Speech: Speech normal.         Behavior: Behavior normal.         Thought Content: Thought content normal.       Assessment:  History of multifocal IDC right breast:  (Inflammatory breast carcinoma)  -at least 5 breast masses; multiple abnormal lymph nodes in axilla on mammogram and ultrasound 09/24/2019  -biopsy 10/01/2019: IDC, intermediate grade, at least 1.2 cm, along with DCIS; right axillary lymph node biopsy positive as well  -ER 93.2%; VT 90.3%; HER2 negative (IHC and FISH testing); Ki-67 15.3% (borderline positive)  -physical exam 11/11/2019:  Orange peel appearance of skin of right breast, involving <1/3 of the skin   -no palpable regional lymphadenopathy but right axillary lymph node biopsy positive on biopsy  -staging bone scan and CTs 11/2019: No metastases  -TTE 11/2019: LVEF 60%  -cT4b cN1 M0, AJCC anatomic stage IIIB, clinical prognostic stage stage IIIB  -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  -cT4d cN1 M0, AJCC anatomic stage IIIB, clinical prognostic stage stage IIIB  (Biopsy positive axillary lymph node)  -ER 93.2%; VT 90.3%; HER2 negative (IHC and FISH testing); Ki-67 15.3% (borderline positive)  -Neoadjuvant DDAC x4 (12/13/2019-01/24/2020), clinically, with good response  -Neoadjuvant weekly Taxol x12 (02/07/2020-04/23/2020)   -05/27/2020: Simple right mastectomy, right axillary lymph node dissection:  Multiple foci of IDC,  largest at least 2.0 cm, overall grade 1; DCIS also present; IDC margin 3.4 cm; DCIS margin 3.4 cm; 3 lymph nodes with macrometastases; 2 lymph nodes with micrometastasis; no definite response to neoadjuvant chemotherapy in IDC; LVI present  >>ypT2 ypN2a (2.0 cm tumor; 5 lymph nodes positive) (no definitive response to neoadjuvant chemotherapy)  ER positive (60%). NV positive (99%). HER-2 not overexpressed (score 0). Ki-67 low proliferation (2%)  -No metastasis (CT C/A/P, bone scan: 07/01/2020; brain MRI: 07/07/2020)  -Normal BMD (DEXA: 07/01/2020)  -S/P adjuvant radiotherapy to right mastectomy bed 06/02/2020-08/07/2020  -for adjuvant endocrine therapy, started on monthly Lupron shots 07/28/2020  -for adjuvant endocrine therapy, Arimidex started 09/20/2020  -03/24/2021:  Right BANG reconstruction of breast:  Right chest skin excision (no malignancy); MediPort removal   -07/14/2021:  2nd stage reconstruction: Left breast reduction, right nipple creation, fat grafting to right breast, and abdominal scar revision  -pelvic ultrasound 09/20/2021:  Uterine fibroid, 1 of them exophytic, adjacent to left ovary   -Pap smear 11/10/2021: NILM  -pelvic MRI 12/15/2021:  Exophytic uterine fibroid 4 cm between uterine fundus and left ovary, stable going back to 2019  -developed right upper extremity lymphedema post axillary lymph node dissection; used lymphedema sleeve and machine  -03/23/2022:  Right breast reconstruction revision with excision of fat necrosis and fat grafting to right breast  -DEXA scan 07/07/2020: Normal BMD but decreased  -pelvic MRI 12/09/2022:  Stable uterine fibroids (exophytic mass towards the fundus up to 3.5 cm, stable; 1.5 cm myometrial mass, stable)  -pelvic ultrasound 07/26/2023:  Stable uterine fibroids  >>>  Metachronous hormone receptor positive invasive lobular carcinoma of left breast:  (failed adjuvant endocrine therapy with Lupron +Arimidex, which was started July/September  2020)  -mammogram/ultrasound 03/13/2024:  Left breast BI-RADS 4: Left upper outer breast 7 mm enhancing focal asymmetry, lesion of concern   -stereotactic biopsy left upper outer breast 7 mm lesion 03/22/2024:  Invasive lobular carcinoma, largest focus 2.1 mm   -ER 25%; CT 0%; HER2 score 0; Ki-67 low proliferation (1%)  -NGS testing on left breast biopsy performed 03/22/2024:  No genetic abnormalities detected for RNA gene fusions; nondiagnostic study (unable to amplified DNA); positive PD-L1 expression for Keytruda based on CPS 5; no PD-L1 expression based on TPS <1%  -CTs C/A/P 05/21/2024: No metastases  -bone scan 05/28/2024: No metastases  -DEXA scan 05/28/2024:  Normal BMD since 07/13/2023 DEXA scan  -CT head without contrast 05/29/2024:  No metastases; no meningioma  -07/02/2024:  ([facility]; Remi Laughlin MD):  Left breast mastectomy with a tissue expander placement and left SLN biopsy: Invasive lobular carcinoma, 9 mm; margins negative; upper outer quadrant; overall grade 2; no DCIS; no LCIS; no LVI; all margins negative for invasive carcinoma (distance from invasive carcinoma to closest margin, 4 mm); # of lymph nodes examined, 1; # of SLN examined, 1; all regional lymph nodes negative for tumor (ER positive, CT negative, HER2 negative)  >>> pT1b pN0 (sn), grade 2, ER positive, CT negative, HER2 negative, AJCC anatomic stage IA, pathological prognostic stage I A  -07/15/2024:  11:24 a.m.:  Before monthly Lupron shot:  Estradiol <24 pg/mL; FSH 3.06 mIU /ml (premenopausal)  -07/15/2024:  Monthly Lupron given at 11:41 a.m.  -Oncotype DX breast recurrence score: 13      Molecular testing:  -genetic testing 12/02/2019:  Essentially negative  -liquid biopsy 04/15/2024: Negative for any relevant findings  -NGS testing on left breast biopsy performed 03/22/2024:  No genetic abnormalities detected for RNA gene fusions; nondiagnostic study (unable to amplified DNA); positive PD-L1 expression for Keytruda  based on CPS 5; no PD-L1 expression based on TPS <1%      Taxol induced peripheral neuropathy:  -Gabapentin started 02/19/2020  -Cymbalta 60 mg p.o. nightly started 03/18/2020      Premenopausal as of 11/11/2019 by history:  -as of 07/09/2020, no menstrual cycles since December 2019 (after starting neoadjuvant chemotherapy)  -premenopausal as of 07/15/2024, as well      Family history of breast cancer and uterine cancers  -12/02/2019: Genetic testing: Three (3) variants of uncertain significance (VUS): AXIN2 c.1573C>G (p.Iig907Fdf), msh3 c.2005C>T (p.Ouo262Kdm), and POLE c.1007A>G (p.Dsl992Siw)      Exophytic fibroid versus left adnexal mass (4 cm, solid) on CT 11/22/2019:  -12/03/2019: Pelvic ultrasound: No discrete sonographic pathology  -pelvic ultrasound 09/20/2021:  Uterine fibroid, 1 of them exophytic, adjacent to left ovary   -Pap smear 11/10/2021: NILM  -pelvic MRI 12/15/2021:  Exophytic uterine fibroid 4 cm between uterine fundus and left ovary, stable going back to 2019  -pelvic MRI 12/09/2022:  Stable uterine fibroids (exophytic mass towards the fundus up to 3.5 cm, stable; 1.5 cm myometrial mass, stable)  -pelvic ultrasound 07/26/2023:  Stable uterine fibroids      Plan:  History of multifocal IDC right breast    recurrence score <15; therefore, adjuvant therapy recommendation:  Adjuvant endocrine therapy +/- ovarian suppression/ablation  -no indication of adjuvant chemotherapy or radiotherapy  -continue monthly Lupron for ovarian suppression  -start tamoxifen 20 mg p.o. q.day (failed OFS/Arimidex in adjuvant setting; we have discontinued Arimidex)  -premenopausal patient  -if becomes postmenopausal after 5 years of adjuvant tamoxifen, then aromatase inhibitor for 5 years (category 1 option); or, continue tamoxifen for an additional 5 years to complete 10 years  -if remains premenopausal after 5 years of tamoxifen, then, consider tamoxifen for an additional 5 years complete 10 years, or no further  endocrine therapy  -no indication of adjuvant radiotherapy  -continue monthly Lupron for ovarian suppression (maximum, until 07/2025, i.e., 07/28/2020-07/2025)  -ovarian function suppression (monthly Lupron; to stop 07/2025 after 5 years of OFS)    -per SOFT and TEXT trials, optimal duration of ovarian suppression therapy is 5 years (07/28/2020-07/2025); no efficacy or safety data to support prolonged ovarian function suppression  -premenopausal patients wishing to continue adjuvant endocrine therapy after ovarian function suppression is stopped, should use tamoxifen  -menopausal status can not be determined while receiving OFS  -check FSH and serum estradiol prior to next dose of Lupron for ovarian function assessment (premenopausal as of 07/15/2024)  -since she has failed adjuvant ovarian suppression therapy +Arimidex, therefore, we have discontinued Arimidex; we have switched to ovarian suppression therapy + tamoxifen (see above)    Continue Tamoxifen 20mg po daily  Proceed with Lupron injection today in infusion   Schedule Lupron injection (No labs ) in infusion on 3/27,4/28   RTC with Carolyne in 3 months with labs prior (CBC/CMP) for clinical breast exam followed by Lupron injection in infusion   Referral to opthalmalogy for yearly eye exam  DEXA scan May 2026  -In the absence of clinical signs or symptoms suggestive of recurrent disease, there is no indication for laboratory or imaging studies for metastasis screening;  -Active lifestyle, healthy diet, limited alcohol intake, and achieving and maintaining an ideal body weight (20-25 BMI) may lead to optimal breast cancer outcomes  -she has reconstructed right breast; routine imaging of reconstructed breasts is not indicated      Monitoring parameters with the tamoxifen:  CBC with platelets, serum calcium, liver function tests;  triglycerides and cholesterol (in patients with preexisting hyperlipidemias);  pregnancy test (prior to treatment in females of  reproductive potential);  monitor for and promptly evaluate abnormal vaginal bleeding, menstrual irregularities, changes in vaginal discharge, or pelvic pain/pressure;  gynecologic exam (baseline and annually, continuing after discontinuation of therapy),  signs/symptoms of thromboembolism (eg, stroke, DVT [leg swelling, tenderness], or PE [shortness of breath]);  ophthalmic exam (if vision problem or cataracts);  bone mineral density (premenopausal women). Monitor adherence.  -Abnormal vaginal bleeding on tamoxifen therapy should prompt evaluation for endometrial cancer if uterus intact.   If anticipated elective surgery while on tamoxifen, then consider discontinuing tamoxifen prior to elective surgery, and resume tamoxifen postoperatively when ambulation is normal.   If DVT, PE, CVA, or prolonged immobilization while on tamoxifen, then discontinue tamoxifen, and treat underlying condition.     Tamoxifen:  ALERT: US Boxed Warning:  Uterine malignancies and thromboembolic events:  Serious and life-threatening events from the use of tamoxifen include uterine malignancies, stroke, and pulmonary embolism. Fatal cases of each type of event have occurred.  Incidence rates per 1,000 woman-years:  Endometrial adenocarcinoma: 2.20 for tamoxifen versus 0.71 for placebo  Uterine sarcoma: 0.17 for tamoxifen versus 0.04 for placebo  Stroke: 1.43 for tamoxifen versus 1.00 for placebo.  Pulmonary embolism: 0.75 for tamoxifen versus 0.25 for placebo  For most patients already diagnosed with breast cancer, the benefits of tamoxifen outweigh its risks.     Ocular effects with the tamoxifen:  Ocular effects (including corneal deposits, cataract, epithelial keratopathy, macular edema, maculopathy, optic neuritis, retinal thrombosis, retinopathy, and vision color changes) have been reported in patients taking tamoxifen. An increased need for cataract surgery has also been reported.  Onset: Variable; may occur at any time after  tamoxifen initiation and may last after tamoxifen discontinuation. Most reports are after 2 years of treatment,.    Thromboembolic events with tamoxifen:  Serious and life-threatening thromboembolic events (some fatal) have occurred with tamoxifen, including cerebrovascular accident (stroke), deep vein thrombosis (DVT), and pulmonary embolism (PE). Risk normalized after discontinuation of therapy.  Onset: Delayed; PE events occurred at an average of 27 months after tamoxifen initiation (range: 2 to 60 months). DVT occurred at an average of 19 months (range: 2 to 57 months). Stroke occurred at an average of 30 months (range: 1 to 63 months).  Risk factors:  Age> 49 years, 1st 24 months abuse, high BMI (=/> 25), history of surgery/fracture/immobilization, factor 5 Leiden mutation, smoking, personal history/family history of venous thromboembolism, hypertension, dyslipidemia    Uterine malignancies:  Uterine malignancies have been reported with tamoxifen, including fatal cases of endometrial carcinoma and uterine carcinoma (sarcoma). The increased risk of endometrial cancer continues during therapy with tamoxifen but decreases after treatment discontinuation.  The estrogenic effects of tamoxifen are associated with endometrial polyp formation, endometrial hyperplasia, and uterine fibroids which lead to increased risk of endometrial carcinoma and uterine sarcoma  Onset: Delayed; endometrial carcinoma has been reported as early as 6 months after initiation to 6 years after discontinuing tamoxifen. Uterine sarcomas have been reported to develop a median of 6 to 9 years after initiation of tamoxifen, with a case developing 20 years after initiation of therapy.  Risk factors:  Cumulative dose.  Prior estrogen replacement therapy.  Obesity.  History of endometrial abnormalities.  Duration of therapy =/> 2 years.     Breast cancer surveillance:  -History and physical exam 1-4 times per year for 5 years, then annually;  -she  is S/P bilateral mastectomy, therefore, no role of surveillance mammography  -she has reconstructed right breast; routine imaging of reconstructed breasts is not indicated  -as of 08/15/2024, she is planning to have left breast reconstructed as well  -Woman or tamoxifen: Annual GYN assessment if uterus present;  -In the absence of clinical signs or symptoms suggestive of recurrent disease, there is no indication for laboratory or imaging studies for metastasis screening;  -Active lifestyle, healthy diet, limited alcohol intake, and achieving and maintaining an ideal body weight (20-25 BMI) may lead to optimal breast cancer outcomes.        Above discussed at length with the patient.  All questions answered.    Plan of adjuvant therapy discussed.  Potential side effects of tamoxifen discussed.   She understands and agrees with this plan.    ,No follow-ups on file.

## 2025-02-27 NOTE — Clinical Note
Proceed with Lupron injection today in infusion Schedule Lupron injection (No labs ) in infusion on 3/27,4/28 RTC with Carolyne with labs prior (CBC/CMP) for clinical breast exam followed by Lupron injection in infusion

## 2025-03-18 ENCOUNTER — OFFICE VISIT (OUTPATIENT)
Dept: INTERNAL MEDICINE | Facility: CLINIC | Age: 50
End: 2025-03-18

## 2025-03-18 VITALS
SYSTOLIC BLOOD PRESSURE: 113 MMHG | BODY MASS INDEX: 33.85 KG/M2 | HEIGHT: 65 IN | DIASTOLIC BLOOD PRESSURE: 80 MMHG | WEIGHT: 203.19 LBS | RESPIRATION RATE: 18 BRPM | OXYGEN SATURATION: 100 % | HEART RATE: 92 BPM | TEMPERATURE: 98 F

## 2025-03-18 DIAGNOSIS — R73.03 PRE-DIABETES: Primary | ICD-10-CM

## 2025-03-18 DIAGNOSIS — I10 HYPERTENSION, UNSPECIFIED TYPE: ICD-10-CM

## 2025-03-18 DIAGNOSIS — D21.9 FIBROIDS: ICD-10-CM

## 2025-03-18 DIAGNOSIS — C50.911 INFILTRATING DUCTAL CARCINOMA OF BREAST, RIGHT: ICD-10-CM

## 2025-03-18 PROCEDURE — 99214 OFFICE O/P EST MOD 30 MIN: CPT | Mod: PBBFAC

## 2025-03-18 NOTE — PROGRESS NOTES
[facility] INTERNAL MEDICINE  OUTPATIENT OFFICE VISIT NOTE       Chief Complaint: Follow-up and Itching       HPI: Jeni Mejia is a 49 y.o. yo female with  has a past medical history of Breast cancer and HTN (hypertension)., who presents for  follow-up appointment . Patient presents today for follow up appointment. She reports doing well currently on Lupreon and Tamoxifen due to her history of breast cancer. She reports having some vaginal discharge with her medications. She denies any fevers, chills, SOB, or dysuria. She did have wet prep and testing performed on 1/2025 which only resulted with rare WBCs. She also reports symptoms of abdominal cramping and hot flashes during this time. Suspect possibly due to hormonal cause. Encouraged patient to further discuss symptoms with Gynecologist as patient already on Cymbalta and Gabapentin therapy. Otherwise, patient doing well.      Past Medical History:   has a past medical history of Breast cancer and HTN (hypertension).     Past Surgical History:   has a past surgical history that includes Mastectomy (Right); Augmentation of breast (Left); Breast surgery (Right); and Tubal ligation.     Family History:  family history includes Breast cancer in her maternal grandmother and paternal grandmother; Hypertension in her mother; No Known Problems in her father.     Social History:   reports that she has never smoked. She has never used smokeless tobacco. She reports that she does not currently use alcohol. She reports that she does not use drugs.     Allergies:  is allergic to sulfa (sulfonamide antibiotics) and sulfamethoxazole-trimethoprim.     Home Medications:  Prior to Admission medications    Medication Sig Start Date End Date Taking? Authorizing Provider   anastrozole (ARIMIDEX) 1 mg Tab Take 1 tablet (1 mg total) by mouth once daily. 9/7/23  Yes Kasia Jerry, ABHIJEET   calcium 26-vit D3-magnesium 15 167 mg calcium- 1.67 mcg-83 mg Cap   Calcium 1200mg plus Vitamin D3  25mcg, See Instructions, take one daily, 0 Refill(s) 2/7/22  Yes Provider, Historical   calcium carbonate (OS-MARK) 600 mg calcium (1,500 mg) Tab Take 600 mg by mouth.   Yes Provider, Historical   meloxicam (MOBIC) 7.5 MG tablet Take 1 tablet (7.5 mg total) by mouth once daily. Take 1 tablet as needed for pain daily 3/30/23  Yes Jorge Shell MD   ondansetron (ZOFRAN) 4 MG tablet TAKE 1 TABLET BY MOUTH EVERY 8 HOURS AS NEEDED FOR NAUSEA 3/30/23  Yes Jorge Shell MD   tamoxifen (NOLVADEX) 10 MG Tab tamoxifen Take No date recorded No form recorded No frequency recorded No route recorded No set duration recorded No set duration amount recorded active No dosage strength recorded No dosage strength units of measure recorded   Yes Provider, Historical   vitamin D (VITAMIN D3) 1000 units Tab Take 1,000 Units by mouth once daily. 7/1/22  Yes Provider, Historical   docusate sodium (COLACE) 100 MG capsule Take 1 capsule (100 mg total) by mouth 2 (two) times daily as needed for Constipation. 4/18/23 9/11/23 Yes Jorge Shell MD   DULoxetine (CYMBALTA) 60 MG capsule Take 1 capsule (60 mg total) by mouth every evening. 8/18/22 9/11/23 Yes Kasia Jerry FNP   gabapentin (NEURONTIN) 400 MG capsule Take 400 mg by mouth. 2/7/22 9/11/23 Yes Provider, Historical   lisinopriL-hydrochlorothiazide (PRINZIDE,ZESTORETIC) 10-12.5 mg per tablet Take by mouth. 2/7/22 9/11/23 Yes Provider, Historical   docusate sodium (COLACE) 100 MG capsule Take 1 capsule (100 mg total) by mouth 2 (two) times daily as needed for Constipation. 9/11/23   Jorge Shell MD   DULoxetine (CYMBALTA) 60 MG capsule Take 1 capsule (60 mg total) by mouth every evening. 9/11/23   Jorge Shell MD   gabapentin (NEURONTIN) 400 MG capsule Take 1 capsule (400 mg total) by mouth 3 (three) times daily. 9/11/23   Jorge Shell MD   hydrOXYzine HCL (ATARAX) 10 MG Tab Take 10 mg by mouth. 8/10/23   Provider, Historical  "  lisinopriL-hydrochlorothiazide (PRINZIDE,ZESTORETIC) 10-12.5 mg per tablet Take 1 tablet by mouth once daily. 9/11/23 9/5/24  oJrge Shell MD       ROS:  Constitutional: no fever, fatigue, weakness  Eye: no vision loss, eye redness, drainage, or pain  ENMT: no sore throat, ear pain, sinus pain/congestion, nasal congestion/drainage  Respiratory: no cough, no wheezing, no shortness of breath  Cardiovascular: no chest pain, no palpitations, no edema  Gastrointestinal: no nausea, vomiting, or diarrhea. No abdominal pain  Genitourinary: no dysuria, no urinary frequency or urgency, no hematuria  Hema/Lymph: no abnormal bruising or bleeding  Endocrine: no heat or cold intolerance, no excessive thirst or excessive urination  Musculoskeletal: no muscle or joint pain, no joint swelling  Integumentary: no skin rash or abnormal lesion  Neurologic: no headache, no dizziness, no weakness or numbness    OBJECTIVE:     Vital signs:   /80 (BP Location: Left arm, Patient Position: Sitting)   Pulse 92   Temp 98.1 °F (36.7 °C) (Oral)   Resp 18   Ht 5' 5" (1.651 m)   Wt 92.2 kg (203 lb 3.2 oz)   SpO2 100%   BMI 33.81 kg/m²      Physical Examination:  Physical Exam  HENT:      Head: Normocephalic and atraumatic.      Mouth/Throat:      Mouth: Mucous membranes are moist.      Pharynx: Oropharynx is clear.   Eyes:      Conjunctiva/sclera: Conjunctivae normal.      Pupils: Pupils are equal, round, and reactive to light.   Cardiovascular:      Rate and Rhythm: Normal rate and regular rhythm.      Pulses: Normal pulses.      Heart sounds: No murmur heard.  Pulmonary:      Effort: Pulmonary effort is normal. No respiratory distress.      Breath sounds: No wheezing.   Chest:      Chest wall: No tenderness.   Abdominal:      General: Abdomen is flat. Bowel sounds are normal. There is no distension.      Palpations: Abdomen is soft.      Tenderness: There is no abdominal tenderness.   Musculoskeletal:         General: No " swelling. Normal range of motion.      Cervical back: Normal range of motion.   Skin:     General: Skin is warm.   Neurological:      General: No focal deficit present.      Mental Status: She is alert and oriented to person, place, and time.        Labs:  Lab Results   Component Value Date    WBC 6.62 02/27/2025    HGB 12.2 02/27/2025    HCT 37.4 02/27/2025     02/27/2025    MCV 95.7 (H) 02/27/2025    RDW 11.9 02/27/2025    Lab Results   Component Value Date     02/27/2025    K 3.5 02/27/2025     02/27/2025    CO2 29 02/27/2025    BUN 13.2 02/27/2025    CREATININE 0.86 02/27/2025    CALCIUM 9.4 02/27/2025    MG 1.90 02/27/2025      Lab Results   Component Value Date    HGBA1C 5.7 04/27/2022    .9 04/27/2022    CREATININE 0.86 02/27/2025    CREATRANDUR 242.1 (H) 10/12/2021    PROTEINURINE 14.4 10/12/2021    Lab Results   Component Value Date    TSH 1.134 07/26/2023    HTDEOD9MCVS 0.99 07/26/2023                  ASSESSMENT & PLAN:     Breast cancer-Stage IIIB (cT4b cN1 M0) Multifocal Infiltrating Ductal Carcinoma of the Right Breast; s/p reconstructive surgery  Invasive ductal carcinoma of left breast   -ER positive (93.2%), WV positive (90.3%), HER-2 negative, Ki-67: 15.3% (borderline)  -Lupron monthly (started July 28, 2020) and Arimidex  -S/p right mastectomy and lymph node dissection May 2020; last chemo April 2020, last radiation August 2020. S/p liposuction and breast augmentation (2021) in [place]  -Patient being seen by Dr. Fraser in [facility] hemat[facility]on[facility] clinic  -Currently on Arimidex, Calcium and Vitamin D  -Now with left upper breast pathology showing invasive lobular carcinoma   -Mastectomy with [place] on 7/2/24 w/ stage invasive lobular carcinoma   -No need for further chemo/radiation therapy per Hematology/Oncology  -Currently on Tamoxifen 20 mg daily and Lupron injection  -Will continue to follow    Hot flashes  Abdominal cramping  Vaginal discharge  -Pelvic exam  with wet prep and HPV, chlamydia/gonorrhea testing negative  -Currently on Lupron and Tamoxifen therapy  -Already on Gabapentin and Cymbalta  -Recommend further discussion with Gynecology about symptoms/treatment    Uterine fibroids  -Reports lower abdominal pain going on for months; does not have reported bleeding  -Transvaginal ultrasound ordered and showed lesion in the uterus most probably related to fibroids; one of them exophytic. Exophytic fibroid appeared to be adjacent to the left ovary.  -MRI pelvis showed appearence of fibroid uterus  -follows with Gynecology, started on over-the-counter vaginal moisturizers    Varicose veins  -Educated patient on importance of life styles changes  -Educated patient on importance of leg elevation at the end of the day and use of compression stockings  -Consider US venous insufficiency next visit if not improved or symptoms worsen     Hypertension  -Well controlled; 113/80 today  -Continue HCTZ 12.5 mg-lisinopril 10 mg daily   -Continue to monitor, refill medications at this time    Prediabetes  -Previously A1c 57 on 4/2022  -Glucose on CMP elevated 150s  -Will reorder A1c to further assess diabetic risk    Health Maintenance   Topic Date Due    Pneumococcal Vaccines (Age 0-49) (2 of 2 - PPSV23) 04/30/2020    COVID-19 Vaccine (3 - Pfizer risk series) 11/19/2021    Influenza Vaccine (1) 09/01/2024    Hemoglobin A1c (Diabetic Prevention Screening)  04/27/2025    Colorectal Cancer Screening  04/11/2026    TETANUS VACCINE  12/07/2026    Lipid Panel  04/27/2027    Cervical Cancer Screening  01/14/2030    RSV Vaccine (Age 60+ and Pregnant patients) (1 - 1-dose 75+ series) 08/12/2050    Hepatitis C Screening  Completed    HIV Screening  Completed        Health Maintenance/ Wellness  Pneumococcal vaccine: #13 (3/5/2020)  TDAP: Up-to-date 2016  Influenza vaccine: UTD 11/22  Shingrix vaccine: Not applicable; performed at age 50  Screening colonoscopy: Cologuard negative  Pap smear:  UTD; follows with Gynecology  Mammogram: S/P MASTECTOMY      Labs ordered: N/A  Imaging: N/A  Medications: reconciled, discussed and refills given.  RTC in 6 months      Jorge Shell MD  [facility] Internal Medicine, [facility]

## 2025-03-18 NOTE — PROGRESS NOTES
I have reviewed and agree with the resident's findings, including all diagnostic interpretations and plans as written.    Nida Goins MD

## 2025-03-27 ENCOUNTER — INFUSION (OUTPATIENT)
Dept: INFUSION THERAPY | Facility: HOSPITAL | Age: 50
End: 2025-03-27
Attending: INTERNAL MEDICINE

## 2025-03-27 VITALS
OXYGEN SATURATION: 97 % | WEIGHT: 201.63 LBS | BODY MASS INDEX: 33.59 KG/M2 | RESPIRATION RATE: 20 BRPM | HEIGHT: 65 IN | TEMPERATURE: 98 F | SYSTOLIC BLOOD PRESSURE: 116 MMHG | DIASTOLIC BLOOD PRESSURE: 85 MMHG | HEART RATE: 89 BPM

## 2025-03-27 DIAGNOSIS — Z01.00 ROUTINE EYE EXAM: Primary | ICD-10-CM

## 2025-03-27 DIAGNOSIS — C50.919 MALIGNANT NEOPLASM OF FEMALE BREAST, UNSPECIFIED ESTROGEN RECEPTOR STATUS, UNSPECIFIED LATERALITY, UNSPECIFIED SITE OF BREAST: Primary | ICD-10-CM

## 2025-03-27 PROCEDURE — 63600175 PHARM REV CODE 636 W HCPCS: Mod: JZ,TB | Performed by: INTERNAL MEDICINE

## 2025-03-27 PROCEDURE — 96402 CHEMO HORMON ANTINEOPL SQ/IM: CPT

## 2025-03-27 RX ADMIN — LEUPROLIDE ACETATE 3.75 MG: KIT at 09:03

## 2025-03-27 NOTE — NURSING
Pt presented to [facility]fusion [facility] for q4wk Lupron injection, no complaints, injection given in LGM, AVS given.

## 2025-04-24 ENCOUNTER — INFUSION (OUTPATIENT)
Dept: INFUSION THERAPY | Facility: HOSPITAL | Age: 50
End: 2025-04-24
Attending: INTERNAL MEDICINE

## 2025-04-24 VITALS
HEIGHT: 65 IN | DIASTOLIC BLOOD PRESSURE: 105 MMHG | OXYGEN SATURATION: 99 % | HEART RATE: 100 BPM | RESPIRATION RATE: 18 BRPM | WEIGHT: 202.81 LBS | TEMPERATURE: 98 F | SYSTOLIC BLOOD PRESSURE: 138 MMHG | BODY MASS INDEX: 33.79 KG/M2

## 2025-04-24 DIAGNOSIS — C50.919 MALIGNANT NEOPLASM OF FEMALE BREAST, UNSPECIFIED ESTROGEN RECEPTOR STATUS, UNSPECIFIED LATERALITY, UNSPECIFIED SITE OF BREAST: Primary | ICD-10-CM

## 2025-04-24 PROCEDURE — 63600175 PHARM REV CODE 636 W HCPCS: Mod: JZ,TB | Performed by: INTERNAL MEDICINE

## 2025-04-24 PROCEDURE — 96402 CHEMO HORMON ANTINEOPL SQ/IM: CPT

## 2025-04-24 RX ADMIN — LEUPROLIDE ACETATE 3.75 MG: KIT at 01:04

## 2025-05-07 ENCOUNTER — OFFICE VISIT (OUTPATIENT)
Dept: URGENT CARE | Facility: CLINIC | Age: 50
End: 2025-05-07

## 2025-05-07 ENCOUNTER — HOSPITAL ENCOUNTER (OUTPATIENT)
Dept: RADIOLOGY | Facility: HOSPITAL | Age: 50
Discharge: HOME | End: 2025-05-07
Attending: FAMILY MEDICINE

## 2025-05-07 VITALS
HEIGHT: 64 IN | TEMPERATURE: 99 F | HEART RATE: 90 BPM | WEIGHT: 204 LBS | BODY MASS INDEX: 34.83 KG/M2 | DIASTOLIC BLOOD PRESSURE: 81 MMHG | OXYGEN SATURATION: 98 % | SYSTOLIC BLOOD PRESSURE: 114 MMHG

## 2025-05-07 DIAGNOSIS — T14.90XA TRAUMA: ICD-10-CM

## 2025-05-07 DIAGNOSIS — S60.041A CONTUSION OF RIGHT RING FINGER WITHOUT DAMAGE TO NAIL, INITIAL ENCOUNTER: Primary | ICD-10-CM

## 2025-05-07 PROCEDURE — 99214 OFFICE O/P EST MOD 30 MIN: CPT | Mod: S$PBB,,, | Performed by: FAMILY MEDICINE

## 2025-05-07 PROCEDURE — 99214 OFFICE O/P EST MOD 30 MIN: CPT | Mod: PBBFAC,25 | Performed by: FAMILY MEDICINE

## 2025-05-07 PROCEDURE — 73130 X-RAY EXAM OF HAND: CPT | Mod: TC,RT

## 2025-05-07 RX ORDER — TRAMADOL HYDROCHLORIDE 50 MG/1
50 TABLET ORAL EVERY 6 HOURS PRN
Qty: 12 TABLET | Refills: 0 | Status: SHIPPED | OUTPATIENT
Start: 2025-05-07
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
## 2025-05-07 NOTE — LETTER
May 7, 2025      [facility]  [id number] [facility] [id number]  Phone: [phone]       Patient: Jeni Mejia   YOB: 1975  Date of Visit: 05/07/2025    To Whom It May Concern:    Brionna Mejia  was at [facility] on 05/07/2025. The patient may return to work/school on MAY 9 2025 with no restrictions. If you have any questions or concerns, or if I can be of further assistance, please do not hesitate to contact me.    Sincerely,    CALEB GENTILE MD

## 2025-05-08 DIAGNOSIS — Z01.00 ROUTINE EYE EXAM: Primary | ICD-10-CM

## 2025-05-15 NOTE — PROGRESS NOTES
Reason for Follow-up:  -metachronous hormone receptor positive invasive lobular carcinoma of left breast, S/P biopsy 03/22/2024; S/P left mastectomy 07/02/2024; pT1b pN0 (sn), grade 2, ER positive, DC negative, HER2 negative, AJCC anatomic stage IA, pathological prognostic stage I A  -history of IDC right breast, inflammatory carcinoma, cT4d cN1 M0, AJCC anatomic stage IIIB, clinical prognostic stage IIIB, S/P biopsy 10/01/2019, ER 93.2%, DC 90.3%, HER2 negative, Ki 6715-3%, S/P neoadjuvant therapy, followed by simple right mastectomy and right axillary lymph node dissection 05/27/2020, ypT2 ypN2a, 2.0 cm residual tumor, 3 lymph nodes with macrometastases, 2 lymph nodes with micrometastasis, no definite response to neoadjuvant chemotherapy, then adjuvant radiotherapy and adjuvant endocrine therapy  -Taxol induced peripheral neuropathy  -premenopausal  -family history of breast cancer and uterine cancer  -uterine fibroids    Current Treatment:  1. Lupron monthly. Started 7/28/2020.     Treatment History:  1. Neoadjuvant DDAC x4 cycles: 12/13/2019-01/24/2020  2. Neoadjuvant Taxol 80 mg/m² IV weekly x12 cycles. Started 2/7/2020. Completed 12 cycles on 4/23/2020.  3. 05/27/2020: Simple right mastectomy, right axillary lymph node dissection  3. XRT 6/2/2020 to 8/7/2020.   -03/24/2021:  Right BANG reconstruction of breast:  Right chest skin excision (no malignancy); MediPort removal   -07/14/2021:  2nd stage reconstruction: Left breast reduction, right nipple creation, fat grafting to right breast, and abdominal scar revision  -S/P left mastectomy 07/02/2024      Past medical history: Hypertension. Obesity. Tubal ligation in 1999.  -Right breast at 6:00, FNA (05/26/2016): Cyst with apocrine metaplasia  Social history: Single. Lives in [place], [place]. Has 2 children. She is an  at a nursing home. Denies history of tobacco, alcohol, or illicit drug abuse.  Family history: Maternal grandmother  experienced breast cancer twice, in her 50s, and then in her 80s. One maternal cousin experienced uterine cancer in her 40s. Another maternal cousin experienced uterine cancer in her 50s. Mother's maternal uncle experienced some kind of cancer in the 70s.  Health maintenance:  ThinPrep cervical Pap smear (07/25/2016: Negative for intraepithelial lesion or malignancy.  Bilateral diagnostic mammogram 05/15/2017, was BI-RADS Category 2, benign.  Menstrual and OB/GYN history: Menarche at age 12. First childbirth at age 18. No history of abortions or miscarriages. Used birth control pills many years back. Currently, as of 11/11/2019, regular and normal menstrual periods.      History of Present Illness:   Oncologic/Hematologic History:  Oncology History   Lobular carcinoma of left breast   4/13/2024 Initial Diagnosis    Lobular carcinoma of left breast     7/2/2024 Cancer Staged    Staging form: Breast, AJCC 8th Edition  - Pathologic stage from 7/2/2024: Stage IA (pT1b, pN0, cM0, G2, ER+, NV-, HER2-)     Invasive ductal carcinoma of right breast in female   10/1/2019 Cancer Staged    Staging form: Breast, AJCC 8th Edition  - Clinical stage from 10/1/2019: Stage IIIB (cT4d, cN1, cM0, G2, ER+, NV+, HER2-)     5/27/2020 Cancer Staged    Staging form: Breast, AJCC 8th Edition  - Pathologic stage from 5/27/2020: ypT2, pN2a, cM0, G1, ER+, NV+, HER2-     4/13/2024 Initial Diagnosis    Invasive ductal carcinoma of right breast in female     44-year-old female referred by Dr. Stephanie Silva for breast cancer.    Patient with history of inflammatory carcinoma right breast, S/P neoadjuvant chemotherapy, right simple mastectomy, right axillary lymph node dissection, adjuvant radiotherapy, adjuvant endocrine therapy, subsequently developing metachronous invasive lobular carcinoma of left breast.    Please see assessment and plan section for details    1/11/2019:  Presents for initial oncology consultation, accompanied by her mother  and daughter. Overall, feels quite well except for some fatigue. Has experienced some right upper extremity numbness, pain, and stinging sensation for last 1 month as a result of this, she is preferring left arm for some activities, like typing, etc. Appetite is good. No unintentional weight loss. No unusual headaches, focal neuro symptoms, chest pain, cough, dyspnea, fevers, chills, abdominal pain, nausea, vomiting, GI bleeding, bone pains, any urinary problems, etc. ECOG 0.    Interval History 05/22/2025:   Patient presents alone for scheduled breast cancer surveillance visit.  She report that she has to go back to complete reconstruction. Patient reports being compliant with Tamoxifen 20mg po daily. She does report hot flashes and vaginal discharge/itching. She reports being up to uptodate with her GYN visit. She states that she has not been seen by opth, she does report blurry vision. She reports noticing increasing fatigue. Denies any  abnormal bleeding, mood swings, headaches. Lab work reviewed with patient, stable. She is due to receive Lupron injection. Patient says she occassionaly has menstrual cycle but very little.  We discussed the plan of care and follow up.     Review of Systems   Constitutional:  Positive for malaise/fatigue.   Eyes:  Positive for blurred vision.   All other systems reviewed and are negative.    Physical Exam  Constitutional:       Appearance: Normal appearance.   HENT:      Head: Normocephalic.      Mouth/Throat:      Mouth: Mucous membranes are moist.   Eyes:      Pupils: Pupils are equal, round, and reactive to light.   Cardiovascular:      Rate and Rhythm: Normal rate and regular rhythm.      Pulses: Normal pulses.      Heart sounds: Normal heart sounds.   Pulmonary:      Effort: Pulmonary effort is normal.      Breath sounds: Normal breath sounds.   Abdominal:      General: Bowel sounds are normal.      Palpations: Abdomen is soft.   Musculoskeletal:         General: Normal  range of motion.      Cervical back: Normal range of motion.   Skin:     General: Skin is warm and dry.      Capillary Refill: Capillary refill takes less than 2 seconds.   Neurological:      General: No focal deficit present.      Mental Status: She is alert and oriented to person, place, and time.   Psychiatric:         Attention and Perception: Attention normal.         Mood and Affect: Affect normal.         Speech: Speech normal.         Behavior: Behavior normal.         Thought Content: Thought content normal.       Assessment:  History of multifocal IDC right breast:  (Inflammatory breast carcinoma)  -at least 5 breast masses; multiple abnormal lymph nodes in axilla on mammogram and ultrasound 09/24/2019  -biopsy 10/01/2019: IDC, intermediate grade, at least 1.2 cm, along with DCIS; right axillary lymph node biopsy positive as well  -ER 93.2%; CO 90.3%; HER2 negative (IHC and FISH testing); Ki-67 15.3% (borderline positive)  -physical exam 11/11/2019:  Orange peel appearance of skin of right breast, involving <1/3 of the skin   -no palpable regional lymphadenopathy but right axillary lymph node biopsy positive on biopsy  -staging bone scan and CTs 11/2019: No metastases  -TTE 11/2019: LVEF 60%  -cT4b cN1 M0, AJCC anatomic stage IIIB, clinical prognostic stage stage IIIB  -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  -cT4d cN1 M0, AJCC anatomic stage IIIB, clinical prognostic stage stage IIIB  (Biopsy positive axillary lymph node)  -ER 93.2%; CO 90.3%; HER2 negative (IHC and FISH testing); Ki-67 15.3% (borderline positive)  -Neoadjuvant DDAC x4 (12/13/2019-01/24/2020), clinically, with good response  -Neoadjuvant weekly Taxol x12 (02/07/2020-04/23/2020)   -05/27/2020: Simple right mastectomy, right axillary lymph node dissection:  Multiple foci of IDC, largest at least 2.0 cm, overall grade 1; DCIS also present; IDC margin 3.4 cm; DCIS margin 3.4 cm; 3 lymph nodes with macrometastases; 2 lymph nodes with  micrometastasis; no definite response to neoadjuvant chemotherapy in IDC; LVI present  >>ypT2 ypN2a (2.0 cm tumor; 5 lymph nodes positive) (no definitive response to neoadjuvant chemotherapy)  ER positive (60%). AL positive (99%). HER-2 not overexpressed (score 0). Ki-67 low proliferation (2%)  -No metastasis (CT C/A/P, bone scan: 07/01/2020; brain MRI: 07/07/2020)  -Normal BMD (DEXA: 07/01/2020)  -S/P adjuvant radiotherapy to right mastectomy bed 06/02/2020-08/07/2020  -for adjuvant endocrine therapy, started on monthly Lupron shots 07/28/2020  -for adjuvant endocrine therapy, Arimidex started 09/20/2020  -03/24/2021:  Right BANG reconstruction of breast:  Right chest skin excision (no malignancy); MediPort removal   -07/14/2021:  2nd stage reconstruction: Left breast reduction, right nipple creation, fat grafting to right breast, and abdominal scar revision  -pelvic ultrasound 09/20/2021:  Uterine fibroid, 1 of them exophytic, adjacent to left ovary   -Pap smear 11/10/2021: NILM  -pelvic MRI 12/15/2021:  Exophytic uterine fibroid 4 cm between uterine fundus and left ovary, stable going back to 2019  -developed right upper extremity lymphedema post axillary lymph node dissection; used lymphedema sleeve and machine  -03/23/2022:  Right breast reconstruction revision with excision of fat necrosis and fat grafting to right breast  -DEXA scan 07/07/2020: Normal BMD but decreased  -pelvic MRI 12/09/2022:  Stable uterine fibroids (exophytic mass towards the fundus up to 3.5 cm, stable; 1.5 cm myometrial mass, stable)  -pelvic ultrasound 07/26/2023:  Stable uterine fibroids  >>>  Metachronous hormone receptor positive invasive lobular carcinoma of left breast:  (failed adjuvant endocrine therapy with Lupron +Arimidex, which was started July/September 2020)  -mammogram/ultrasound 03/13/2024:  Left breast BI-RADS 4: Left upper outer breast 7 mm enhancing focal asymmetry, lesion of concern   -stereotactic biopsy left upper  outer breast 7 mm lesion 03/22/2024:  Invasive lobular carcinoma, largest focus 2.1 mm   -ER 25%; NE 0%; HER2 score 0; Ki-67 low proliferation (1%)  -NGS testing on left breast biopsy performed 03/22/2024:  No genetic abnormalities detected for RNA gene fusions; nondiagnostic study (unable to amplified DNA); positive PD-L1 expression for Keytruda based on CPS 5; no PD-L1 expression based on TPS <1%  -CTs C/A/P 05/21/2024: No metastases  -bone scan 05/28/2024: No metastases  -DEXA scan 05/28/2024:  Normal BMD since 07/13/2023 DEXA scan  -CT head without contrast 05/29/2024:  No metastases; no meningioma  -07/02/2024:  ([facility]; Remi Laughlin MD):  Left breast mastectomy with a tissue expander placement and left SLN biopsy: Invasive lobular carcinoma, 9 mm; margins negative; upper outer quadrant; overall grade 2; no DCIS; no LCIS; no LVI; all margins negative for invasive carcinoma (distance from invasive carcinoma to closest margin, 4 mm); # of lymph nodes examined, 1; # of SLN examined, 1; all regional lymph nodes negative for tumor (ER positive, NE negative, HER2 negative)  >>> pT1b pN0 (sn), grade 2, ER positive, NE negative, HER2 negative, AJCC anatomic stage IA, pathological prognostic stage I A  -07/15/2024:  11:24 a.m.:  Before monthly Lupron shot:  Estradiol <24 pg/mL; FSH 3.06 mIU /ml (premenopausal)  -07/15/2024:  Monthly Lupron given at 11:41 a.m.  -Oncotype DX breast recurrence score: 13      Molecular testing:  -genetic testing 12/02/2019:  Essentially negative  -liquid biopsy 04/15/2024: Negative for any relevant findings  -NGS testing on left breast biopsy performed 03/22/2024:  No genetic abnormalities detected for RNA gene fusions; nondiagnostic study (unable to amplified DNA); positive PD-L1 expression for Keytruda based on CPS 5; no PD-L1 expression based on TPS <1%      Taxol induced peripheral neuropathy:  -Gabapentin started 02/19/2020  -Cymbalta 60 mg p.o. nightly started  03/18/2020      Premenopausal as of 11/11/2019 by history:  -as of 07/09/2020, no menstrual cycles since December 2019 (after starting neoadjuvant chemotherapy)  -premenopausal as of 07/15/2024, as well      Family history of breast cancer and uterine cancers  -12/02/2019: Genetic testing: Three (3) variants of uncertain significance (VUS): AXIN2 c.1573C>G (p.[id number]), msh3 c.2005C>T (p.Atv953Veb), and POLE c.1007A>G (p.[id number])      Exophytic fibroid versus left adnexal mass (4 cm, solid) on CT 11/22/2019:  -12/03/2019: Pelvic ultrasound: No discrete sonographic pathology  -pelvic ultrasound 09/20/2021:  Uterine fibroid, 1 of them exophytic, adjacent to left ovary   -Pap smear 11/10/2021: NILM  -pelvic MRI 12/15/2021:  Exophytic uterine fibroid 4 cm between uterine fundus and left ovary, stable going back to 2019  -pelvic MRI 12/09/2022:  Stable uterine fibroids (exophytic mass towards the fundus up to 3.5 cm, stable; 1.5 cm myometrial mass, stable)  -pelvic ultrasound 07/26/2023:  Stable uterine fibroids      Plan:  History of multifocal IDC right breast    recurrence score <15; therefore, adjuvant therapy recommendation:  Adjuvant endocrine therapy +/- ovarian suppression/ablation  -no indication of adjuvant chemotherapy or radiotherapy  -continue monthly Lupron for ovarian suppression  -start tamoxifen 20 mg p.o. q.day (failed OFS/Arimidex in adjuvant setting; we have discontinued Arimidex)  -premenopausal patient  -if becomes postmenopausal after 5 years of adjuvant tamoxifen, then aromatase inhibitor for 5 years (category 1 option); or, continue tamoxifen for an additional 5 years to complete 10 years  -if remains premenopausal after 5 years of tamoxifen, then, consider tamoxifen for an additional 5 years complete 10 years, or no further endocrine therapy  -no indication of adjuvant radiotherapy  -continue monthly Lupron for ovarian suppression (maximum, until 07/2025, i.e., 07/28/2020-07/2025)  -ovarian  function suppression (monthly Lupron; to stop 07/2025 after 5 years of OFS)    -per SOFT and TEXT trials, optimal duration of ovarian suppression therapy is 5 years (07/28/2020-07/2025); no efficacy or safety data to support prolonged ovarian function suppression  -premenopausal patients wishing to continue adjuvant endocrine therapy after ovarian function suppression is stopped, should use tamoxifen  -menopausal status can not be determined while receiving OFS  -check FSH and serum estradiol prior to next dose of Lupron for ovarian function assessment (premenopausal as of 07/15/2024)  -since she has failed adjuvant ovarian suppression therapy +Arimidex, therefore, we have discontinued Arimidex; we have switched to ovarian suppression therapy + tamoxifen (see above)    Continue Tamoxifen 20mg po daily  Proceed with Lupron injection today in infusion   Schedule Lupron injection (No labs ) in infusion on 6/19  RTC with MD in 2 months 07/17/2025  with labs prior (CBC/CMP) for clinical breast exam followed by Lupron injection in infusion   Follow up with opthalmalogy for yearly eye exam, 08/04/2025  Follow up with GYN, 01/20/2026  Diflucan 150 mg once for vaginal candidiasis   Refill sent for gabapentin  DEXA scan May 2026  -In the absence of clinical signs or symptoms suggestive of recurrent disease, there is no indication for laboratory or imaging studies for metastasis screening;  -Active lifestyle, healthy diet, limited alcohol intake, and achieving and maintaining an ideal body weight (20-25 BMI) may lead to optimal breast cancer outcomes  -she has reconstructed right breast; routine imaging of reconstructed breasts is not indicated      Monitoring parameters with the tamoxifen:  CBC with platelets, serum calcium, liver function tests;  triglycerides and cholesterol (in patients with preexisting hyperlipidemias);  pregnancy test (prior to treatment in females of reproductive potential);  monitor for and promptly  evaluate abnormal vaginal bleeding, menstrual irregularities, changes in vaginal discharge, or pelvic pain/pressure;  gynecologic exam (baseline and annually, continuing after discontinuation of therapy),  signs/symptoms of thromboembolism (eg, stroke, DVT [leg swelling, tenderness], or PE [shortness of breath]);  ophthalmic exam (if vision problem or cataracts);  bone mineral density (premenopausal women). Monitor adherence.  -Abnormal vaginal bleeding on tamoxifen therapy should prompt evaluation for endometrial cancer if uterus intact.   If anticipated elective surgery while on tamoxifen, then consider discontinuing tamoxifen prior to elective surgery, and resume tamoxifen postoperatively when ambulation is normal.   If DVT, PE, CVA, or prolonged immobilization while on tamoxifen, then discontinue tamoxifen, and treat underlying condition.     Tamoxifen:  ALERT: US Boxed Warning:  Uterine malignancies and thromboembolic events:  Serious and life-threatening events from the use of tamoxifen include uterine malignancies, stroke, and pulmonary embolism. Fatal cases of each type of event have occurred.  Incidence rates per 1,000 woman-years:  Endometrial adenocarcinoma: 2.20 for tamoxifen versus 0.71 for placebo  Uterine sarcoma: 0.17 for tamoxifen versus 0.04 for placebo  Stroke: 1.43 for tamoxifen versus 1.00 for placebo.  Pulmonary embolism: 0.75 for tamoxifen versus 0.25 for placebo  For most patients already diagnosed with breast cancer, the benefits of tamoxifen outweigh its risks.     Ocular effects with the tamoxifen:  Ocular effects (including corneal deposits, cataract, epithelial keratopathy, macular edema, maculopathy, optic neuritis, retinal thrombosis, retinopathy, and vision color changes) have been reported in patients taking tamoxifen. An increased need for cataract surgery has also been reported.  Onset: Variable; may occur at any time after tamoxifen initiation and may last after tamoxifen  discontinuation. Most reports are after 2 years of treatment,.    Thromboembolic events with tamoxifen:  Serious and life-threatening thromboembolic events (some fatal) have occurred with tamoxifen, including cerebrovascular accident (stroke), deep vein thrombosis (DVT), and pulmonary embolism (PE). Risk normalized after discontinuation of therapy.  Onset: Delayed; PE events occurred at an average of 27 months after tamoxifen initiation (range: 2 to 60 months). DVT occurred at an average of 19 months (range: 2 to 57 months). Stroke occurred at an average of 30 months (range: 1 to 63 months).  Risk factors:  Age> 49 years, 1st 24 months abuse, high BMI (=/> 25), history of surgery/fracture/immobilization, factor 5 Leiden mutation, smoking, personal history/family history of venous thromboembolism, hypertension, dyslipidemia    Uterine malignancies:  Uterine malignancies have been reported with tamoxifen, including fatal cases of endometrial carcinoma and uterine carcinoma (sarcoma). The increased risk of endometrial cancer continues during therapy with tamoxifen but decreases after treatment discontinuation.  The estrogenic effects of tamoxifen are associated with endometrial polyp formation, endometrial hyperplasia, and uterine fibroids which lead to increased risk of endometrial carcinoma and uterine sarcoma  Onset: Delayed; endometrial carcinoma has been reported as early as 6 months after initiation to 6 years after discontinuing tamoxifen. Uterine sarcomas have been reported to develop a median of 6 to 9 years after initiation of tamoxifen, with a case developing 20 years after initiation of therapy.  Risk factors:  Cumulative dose.  Prior estrogen replacement therapy.  Obesity.  History of endometrial abnormalities.  Duration of therapy =/> 2 years.     Breast cancer surveillance:  -History and physical exam 1-4 times per year for 5 years, then annually;  -she is S/P bilateral mastectomy, therefore, no role of  surveillance mammography  -she has reconstructed right breast; routine imaging of reconstructed breasts is not indicated  -as of 08/15/2024, she is planning to have left breast reconstructed as well  -Woman or tamoxifen: Annual GYN assessment if uterus present;  -In the absence of clinical signs or symptoms suggestive of recurrent disease, there is no indication for laboratory or imaging studies for metastasis screening;  -Active lifestyle, healthy diet, limited alcohol intake, and achieving and maintaining an ideal body weight (20-25 BMI) may lead to optimal breast cancer outcomes.        Above discussed at length with the patient.  All questions answered.    Plan of adjuvant therapy discussed.  Potential side effects of tamoxifen discussed.   She understands and agrees with this plan.    ,Follow up in 8 weeks (on 7/17/2025) for with MD, treatment day.

## 2025-05-21 ENCOUNTER — TELEPHONE (OUTPATIENT)
Dept: HEMATOLOGY/ONCOLOGY | Facility: CLINIC | Age: 50
End: 2025-05-21

## 2025-05-22 ENCOUNTER — INFUSION (OUTPATIENT)
Dept: INFUSION THERAPY | Facility: HOSPITAL | Age: 50
End: 2025-05-22
Attending: INTERNAL MEDICINE

## 2025-05-22 ENCOUNTER — LAB VISIT (OUTPATIENT)
Dept: HEMATOLOGY/ONCOLOGY | Facility: CLINIC | Age: 50
End: 2025-05-22

## 2025-05-22 ENCOUNTER — OFFICE VISIT (OUTPATIENT)
Dept: HEMATOLOGY/ONCOLOGY | Facility: CLINIC | Age: 50
End: 2025-05-22

## 2025-05-22 VITALS
OXYGEN SATURATION: 100 % | DIASTOLIC BLOOD PRESSURE: 80 MMHG | RESPIRATION RATE: 14 BRPM | SYSTOLIC BLOOD PRESSURE: 107 MMHG | HEART RATE: 87 BPM | WEIGHT: 205 LBS | BODY MASS INDEX: 35 KG/M2 | HEIGHT: 64 IN | TEMPERATURE: 99 F

## 2025-05-22 DIAGNOSIS — Z98.890 S/P BREAST RECONSTRUCTION: ICD-10-CM

## 2025-05-22 DIAGNOSIS — G62.9 NEUROPATHY: ICD-10-CM

## 2025-05-22 DIAGNOSIS — C50.919 MALIGNANT NEOPLASM OF FEMALE BREAST, UNSPECIFIED ESTROGEN RECEPTOR STATUS, UNSPECIFIED LATERALITY, UNSPECIFIED SITE OF BREAST: Primary | ICD-10-CM

## 2025-05-22 DIAGNOSIS — C50.912 LOBULAR CARCINOMA OF LEFT BREAST: ICD-10-CM

## 2025-05-22 DIAGNOSIS — G62.0 CHEMOTHERAPY-INDUCED NEUROPATHY: ICD-10-CM

## 2025-05-22 DIAGNOSIS — Z01.00 ROUTINE EYE EXAM: ICD-10-CM

## 2025-05-22 DIAGNOSIS — B37.31 VAGINAL CANDIDIASIS: Primary | ICD-10-CM

## 2025-05-22 DIAGNOSIS — T45.1X5A CHEMOTHERAPY-INDUCED NEUROPATHY: ICD-10-CM

## 2025-05-22 DIAGNOSIS — Z79.810 LONG-TERM CURRENT USE OF TAMOXIFEN: ICD-10-CM

## 2025-05-22 DIAGNOSIS — C50.911 INFILTRATING DUCTAL CARCINOMA OF BREAST, RIGHT: ICD-10-CM

## 2025-05-22 LAB
ALBUMIN SERPL-MCNC: 3.3 G/DL (ref 3.5–5)
ALBUMIN/GLOB SERPL: 0.8 RATIO (ref 1.1–2)
ALP SERPL-CCNC: 86 UNIT/L (ref 40–150)
ALT SERPL-CCNC: 21 UNIT/L (ref 0–55)
ANION GAP SERPL CALC-SCNC: 6 MEQ/L
AST SERPL-CCNC: 19 UNIT/L (ref 11–45)
BASOPHILS # BLD AUTO: 0.03 X10(3)/MCL
BASOPHILS NFR BLD AUTO: 0.5 %
BILIRUB SERPL-MCNC: 0.3 MG/DL
BUN SERPL-MCNC: 12.1 MG/DL (ref 7–18.7)
CALCIUM SERPL-MCNC: 9.5 MG/DL (ref 8.4–10.2)
CHLORIDE SERPL-SCNC: 108 MMOL/L (ref 98–107)
CO2 SERPL-SCNC: 27 MMOL/L (ref 22–29)
CREAT SERPL-MCNC: 0.76 MG/DL (ref 0.55–1.02)
CREAT/UREA NIT SERPL: 16
EOSINOPHIL # BLD AUTO: 0.2 X10(3)/MCL (ref 0–0.9)
EOSINOPHIL NFR BLD AUTO: 3.2 %
ERYTHROCYTE [DISTWIDTH] IN BLOOD BY AUTOMATED COUNT: 12.1 % (ref 11.5–17)
GFR SERPLBLD CREATININE-BSD FMLA CKD-EPI: >60 ML/MIN/1.73/M2
GLOBULIN SER-MCNC: 4.2 GM/DL (ref 2.4–3.5)
GLUCOSE SERPL-MCNC: 104 MG/DL (ref 74–100)
HCT VFR BLD AUTO: 35.1 % (ref 37–47)
HGB BLD-MCNC: 11.9 G/DL (ref 12–16)
IMM GRANULOCYTES # BLD AUTO: 0.03 X10(3)/MCL (ref 0–0.04)
IMM GRANULOCYTES NFR BLD AUTO: 0.5 %
LYMPHOCYTES # BLD AUTO: 2.95 X10(3)/MCL (ref 0.6–4.6)
LYMPHOCYTES NFR BLD AUTO: 47.7 %
MCH RBC QN AUTO: 32.7 PG (ref 27–31)
MCHC RBC AUTO-ENTMCNC: 33.9 G/DL (ref 33–36)
MCV RBC AUTO: 96.4 FL (ref 80–94)
MONOCYTES # BLD AUTO: 0.54 X10(3)/MCL (ref 0.1–1.3)
MONOCYTES NFR BLD AUTO: 8.7 %
NEUTROPHILS # BLD AUTO: 2.43 X10(3)/MCL (ref 2.1–9.2)
NEUTROPHILS NFR BLD AUTO: 39.4 %
NRBC BLD AUTO-RTO: 0 %
PLATELET # BLD AUTO: 221 X10(3)/MCL (ref 130–400)
PMV BLD AUTO: 9.5 FL (ref 7.4–10.4)
POTASSIUM SERPL-SCNC: 3.5 MMOL/L (ref 3.5–5.1)
PROT SERPL-MCNC: 7.5 GM/DL (ref 6.4–8.3)
RBC # BLD AUTO: 3.64 X10(6)/MCL (ref 4.2–5.4)
SODIUM SERPL-SCNC: 141 MMOL/L (ref 136–145)
WBC # BLD AUTO: 6.18 X10(3)/MCL (ref 4.5–11.5)

## 2025-05-22 PROCEDURE — 80053 COMPREHEN METABOLIC PANEL: CPT

## 2025-05-22 PROCEDURE — 63600175 PHARM REV CODE 636 W HCPCS: Mod: JZ,TB

## 2025-05-22 PROCEDURE — 85025 COMPLETE CBC W/AUTO DIFF WBC: CPT

## 2025-05-22 PROCEDURE — 96401 CHEMO ANTI-NEOPL SQ/IM: CPT

## 2025-05-22 PROCEDURE — 99214 OFFICE O/P EST MOD 30 MIN: CPT | Mod: PBBFAC

## 2025-05-22 RX ORDER — FLUCONAZOLE 100 MG/1
150 TABLET ORAL ONCE
Qty: 2 TABLET | Refills: 0 | Status: SHIPPED | OUTPATIENT
Start: 2025-05-22 | End: 2025-05-22

## 2025-05-22 RX ORDER — GABAPENTIN 400 MG/1
400 CAPSULE ORAL 3 TIMES DAILY
Qty: 120 CAPSULE | Refills: 3 | Status: SHIPPED | OUTPATIENT
Start: 2025-05-22

## 2025-05-22 RX ADMIN — LEUPROLIDE ACETATE 3.75 MG: KIT at 02:05

## 2025-06-19 ENCOUNTER — INFUSION (OUTPATIENT)
Dept: INFUSION THERAPY | Facility: HOSPITAL | Age: 50
End: 2025-06-19
Attending: INTERNAL MEDICINE

## 2025-06-19 VITALS
BODY MASS INDEX: 35.17 KG/M2 | OXYGEN SATURATION: 95 % | HEART RATE: 84 BPM | HEIGHT: 64 IN | TEMPERATURE: 98 F | SYSTOLIC BLOOD PRESSURE: 112 MMHG | RESPIRATION RATE: 20 BRPM | DIASTOLIC BLOOD PRESSURE: 87 MMHG | WEIGHT: 206 LBS

## 2025-06-19 DIAGNOSIS — C50.919 MALIGNANT NEOPLASM OF FEMALE BREAST, UNSPECIFIED ESTROGEN RECEPTOR STATUS, UNSPECIFIED LATERALITY, UNSPECIFIED SITE OF BREAST: Primary | ICD-10-CM

## 2025-06-19 PROCEDURE — 96402 CHEMO HORMON ANTINEOPL SQ/IM: CPT

## 2025-06-19 PROCEDURE — 63600175 PHARM REV CODE 636 W HCPCS: Mod: JZ,TB | Performed by: INTERNAL MEDICINE

## 2025-06-19 RX ADMIN — LEUPROLIDE ACETATE 3.75 MG: KIT at 02:06

## 2025-07-16 ENCOUNTER — TELEPHONE (OUTPATIENT)
Dept: HEMATOLOGY/ONCOLOGY | Facility: CLINIC | Age: 50
End: 2025-07-16

## 2025-07-17 ENCOUNTER — OFFICE VISIT (OUTPATIENT)
Dept: HEMATOLOGY/ONCOLOGY | Facility: CLINIC | Age: 50
End: 2025-07-17
Attending: INTERNAL MEDICINE

## 2025-07-17 ENCOUNTER — INFUSION (OUTPATIENT)
Dept: INFUSION THERAPY | Facility: HOSPITAL | Age: 50
End: 2025-07-17
Attending: INTERNAL MEDICINE

## 2025-07-17 VITALS
TEMPERATURE: 98 F | SYSTOLIC BLOOD PRESSURE: 118 MMHG | BODY MASS INDEX: 34.62 KG/M2 | DIASTOLIC BLOOD PRESSURE: 87 MMHG | WEIGHT: 202.81 LBS | HEIGHT: 64 IN | OXYGEN SATURATION: 100 % | RESPIRATION RATE: 18 BRPM | HEART RATE: 76 BPM

## 2025-07-17 VITALS
OXYGEN SATURATION: 100 % | RESPIRATION RATE: 18 BRPM | TEMPERATURE: 98 F | SYSTOLIC BLOOD PRESSURE: 121 MMHG | BODY MASS INDEX: 34.62 KG/M2 | HEIGHT: 64 IN | WEIGHT: 202.81 LBS | HEART RATE: 77 BPM | DIASTOLIC BLOOD PRESSURE: 92 MMHG

## 2025-07-17 DIAGNOSIS — C50.912 LOBULAR CARCINOMA OF LEFT BREAST: ICD-10-CM

## 2025-07-17 DIAGNOSIS — C50.911 INFILTRATING DUCTAL CARCINOMA OF BREAST, RIGHT: Primary | ICD-10-CM

## 2025-07-17 DIAGNOSIS — T45.1X5A CHEMOTHERAPY-INDUCED NEUROPATHY: Primary | ICD-10-CM

## 2025-07-17 DIAGNOSIS — N95.9 PREMENOPAUSAL PATIENT: ICD-10-CM

## 2025-07-17 DIAGNOSIS — Z90.12 HISTORY OF LEFT MASTECTOMY: ICD-10-CM

## 2025-07-17 DIAGNOSIS — C50.919 MALIGNANT NEOPLASM OF FEMALE BREAST, UNSPECIFIED ESTROGEN RECEPTOR STATUS, UNSPECIFIED LATERALITY, UNSPECIFIED SITE OF BREAST: Primary | ICD-10-CM

## 2025-07-17 DIAGNOSIS — Z80.49 FAMILY HISTORY OF UTERINE CANCER: ICD-10-CM

## 2025-07-17 DIAGNOSIS — Z80.3 FAMILY HISTORY OF BREAST CANCER: ICD-10-CM

## 2025-07-17 DIAGNOSIS — Z79.810 LONG-TERM CURRENT USE OF TAMOXIFEN: ICD-10-CM

## 2025-07-17 DIAGNOSIS — Z90.11 H/O RIGHT MASTECTOMY: ICD-10-CM

## 2025-07-17 DIAGNOSIS — D25.9 UTERINE LEIOMYOMA, UNSPECIFIED LOCATION: ICD-10-CM

## 2025-07-17 DIAGNOSIS — C50.911 INVASIVE DUCTAL CARCINOMA OF RIGHT BREAST IN FEMALE: ICD-10-CM

## 2025-07-17 DIAGNOSIS — G62.0 CHEMOTHERAPY-INDUCED NEUROPATHY: Primary | ICD-10-CM

## 2025-07-17 DIAGNOSIS — Z98.890 S/P BREAST RECONSTRUCTION: ICD-10-CM

## 2025-07-17 DIAGNOSIS — C50.912 CARCINOMA OF LEFT BREAST IN FEMALE, ESTROGEN RECEPTOR POSITIVE, UNSPECIFIED SITE OF BREAST: ICD-10-CM

## 2025-07-17 DIAGNOSIS — Z17.0 CARCINOMA OF LEFT BREAST IN FEMALE, ESTROGEN RECEPTOR POSITIVE, UNSPECIFIED SITE OF BREAST: ICD-10-CM

## 2025-07-17 DIAGNOSIS — Z85.3 ENCOUNTER FOR FOLLOW-UP SURVEILLANCE OF BREAST CANCER: ICD-10-CM

## 2025-07-17 DIAGNOSIS — C50.911 INFILTRATING DUCTAL CARCINOMA OF BREAST, RIGHT: ICD-10-CM

## 2025-07-17 DIAGNOSIS — Z08 ENCOUNTER FOR FOLLOW-UP SURVEILLANCE OF BREAST CANCER: ICD-10-CM

## 2025-07-17 PROCEDURE — 96402 CHEMO HORMON ANTINEOPL SQ/IM: CPT

## 2025-07-17 PROCEDURE — 99214 OFFICE O/P EST MOD 30 MIN: CPT | Mod: PBBFAC,25 | Performed by: INTERNAL MEDICINE

## 2025-07-17 PROCEDURE — 63600175 PHARM REV CODE 636 W HCPCS: Mod: JZ,TB | Performed by: INTERNAL MEDICINE

## 2025-07-17 PROCEDURE — 99215 OFFICE O/P EST HI 40 MIN: CPT | Mod: S$PBB,,, | Performed by: INTERNAL MEDICINE

## 2025-07-17 RX ADMIN — LEUPROLIDE ACETATE 3.75 MG: KIT at 02:07

## 2025-07-17 NOTE — PROGRESS NOTES
History:  Past Medical History:   Diagnosis Date    Breast cancer     HTN (hypertension)       Past Surgical History:   Procedure Laterality Date    AUGMENTATION OF BREAST Left     BREAST SURGERY Right     tram flap of rt breast    MASTECTOMY Right     TUBAL LIGATION        Social History     Socioeconomic History    Marital status: Single   Tobacco Use    Smoking status: Never    Smokeless tobacco: Never   Substance and Sexual Activity    Alcohol use: Not Currently     Comment: occassionally    Drug use: Never    Sexual activity: Yes     Partners: Male     Social Drivers of Health     Financial Resource Strain: Low Risk  (3/30/2023)    Overall Financial Resource Strain (CARDIA)     Difficulty of Paying Living Expenses: Not very hard   Food Insecurity: No Food Insecurity (3/30/2023)    Hunger Vital Sign     Worried About Running Out of Food in the Last Year: Never true     Ran Out of Food in the Last Year: Never true   Transportation Needs: No Transportation Needs (3/30/2023)    PRAPARE - Transportation     Lack of Transportation (Medical): No     Lack of Transportation (Non-Medical): No   Physical Activity: Inactive (3/30/2023)    Exercise Vital Sign     Days of Exercise per Week: 0 days     Minutes of Exercise per Session: 0 min   Stress: Stress Concern Present (3/30/2023)    [employer] - Occupational Stress Questionnaire     Feeling of Stress : To some extent   Housing Stability: Low Risk  (3/30/2023)    Housing Stability Vital Sign     Unable to Pay for Housing in the Last Year: No     Number of Places Lived in the Last Year: 1     Unstable Housing in the Last Year: No      Family History   Problem Relation Name Age of Onset    Breast cancer Paternal Grandmother      Breast cancer Maternal Grandmother      No Known Problems Father      Hypertension Mother Angela Lina yanes     Breast cancer Mother Angela Mills joaquín       Reason for Follow-up:  -metachronous hormone receptor  positive invasive lobular carcinoma of left breast, S/P biopsy 03/22/2024; S/P left mastectomy 07/02/2024; pT1b pN0 (sn), grade 2, ER positive, OK negative, HER2 negative, AJCC anatomic stage IA, pathological prognostic stage IA  -history of IDC right breast, inflammatory carcinoma, cT4d cN1 M0, AJCC anatomic stage IIIB, clinical prognostic stage IIIB, S/P biopsy 10/01/2019, ER 93.2%, OK 90.3%, HER2 negative, Ki 6715-3%, S/P neoadjuvant therapy, followed by simple right mastectomy and right axillary lymph node dissection 05/27/2020, ypT2 ypN2a, 2.0 cm residual tumor, 3 lymph nodes with macrometastases, 2 lymph nodes with micrometastases, no definite response to neoadjuvant chemotherapy, then adjuvant radiotherapy and adjuvant endocrine therapy  -Taxol induced peripheral neuropathy  -premenopausal  -family history of breast cancer and uterine cancer  -uterine fibroids    History of Present Illness:   Invasive ductal carcinoma of right breast in female      Oncologic/Hematologic History:  Oncology History   Lobular carcinoma of left breast   4/13/2024 Initial Diagnosis    Lobular carcinoma of left breast     7/2/2024 Cancer Staged    Staging form: Breast, AJCC 8th Edition  - Pathologic stage from 7/2/2024: Stage IA (pT1b, pN0, cM0, G2, ER+, OK-, HER2-)     Invasive ductal carcinoma of right breast in female   10/1/2019 Cancer Staged    Staging form: Breast, AJCC 8th Edition  - Clinical stage from 10/1/2019: Stage IIIB (cT4d, cN1, cM0, G2, ER+, OK+, HER2-)     5/27/2020 Cancer Staged    Staging form: Breast, AJCC 8th Edition  - Pathologic stage from 5/27/2020: ypT2, pN2a, cM0, G1, ER+, OK+, HER2-     4/13/2024 Initial Diagnosis    Invasive ductal carcinoma of right breast in female     Past medical history: Hypertension. Obesity. Tubal ligation in 1999.  -Right breast at 6:00, FNA (05/26/2016): Cyst with apocrine metaplasia  Procedure/surgical history:  Left breast augmentation; right breast TRAM flap; right  mastectomy; tubal ligation  Social history: Single. Lives in [place], [place]. Has 2 children. She is an  at a nursing home. Denies history of tobacco, alcohol, or illicit drug abuse.  Family history: Maternal grandmother experienced breast cancer twice, in her 50s, and then in her 80s. One maternal cousin experienced uterine cancer in her 40s. Another maternal cousin experienced uterine cancer in her 50s. Mother's maternal uncle experienced some kind of cancer in the 70s.  Health maintenance:  ThinPrep cervical Pap smear (07/25/2016: Negative for intraepithelial lesion or malignancy.  Bilateral diagnostic mammogram 05/15/2017, was BI-RADS Category 2, benign.  Menstrual and OB/GYN history: Menarche at age 12. First childbirth at age 18. No history of abortions or miscarriages. Used birth control pills many years back. Currently, as of 11/11/2019, regular and normal menstrual periods.       44-year-old female referred by Dr. Stephanie Silva for breast cancer.  Patient with history of inflammatory carcinoma right breast, S/P neoadjuvant chemotherapy, right simple mastectomy, right axillary lymph node dissection, adjuvant radiotherapy, adjuvant endocrine therapy, subsequently developing metachronous invasive lobular carcinoma of left breast.    Please see assessment and plan section for details    01/11/2019:  Presents for initial oncology consultation, accompanied by her mother and daughter. Overall, feels quite well except for some fatigue. Has experienced some right upper extremity numbness, pain, and stinging sensation for last 1 month as a result of this, she is preferring left arm for some activities, like typing, etc. Appetite is good. No unintentional weight loss. No unusual headaches, focal neuro symptoms, chest pain, cough, dyspnea, fevers, chills, abdominal pain, nausea, vomiting, GI bleeding, bone pains, any urinary problems, etc. ECOG 0.      Oncologic history:  # History of  multifocal IDC right breast:  (Inflammatory breast carcinoma)  -at least 5 breast masses; multiple abnormal lymph nodes in axilla on mammogram and ultrasound 09/24/2019  -biopsy 10/01/2019: IDC, intermediate grade, at least 1.2 cm, along with DCIS; right axillary lymph node biopsy positive as well  -ER 93.2%; HI 90.3%; HER2 negative (IHC and FISH testing); Ki-67 15.3% (borderline positive)  -physical exam 11/11/2019:  Orange peel appearance of skin of right breast, involving <1/3 of the skin   -no palpable regional lymphadenopathy but right axillary lymph node biopsy positive on biopsy  -staging bone scan and CTs 11/2019: No metastases  -TTE 11/2019: LVEF 60%  -cT4b cN1 M0, AJCC anatomic stage IIIB, clinical prognostic stage stage IIIB  -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  -cT4d cN1 M0, AJCC anatomic stage IIIB, clinical prognostic stage stage IIIB  (Biopsy positive axillary lymph node)  -ER 93.2%; HI 90.3%; HER2 negative (IHC and FISH testing); Ki-67 15.3% (borderline positive)  -Neoadjuvant DDAC x4 (12/13/2019-01/24/2020), clinically, with good response  -Neoadjuvant weekly Taxol x12 (02/07/2020-04/23/2020)   -05/27/2020: Simple right mastectomy, right axillary lymph node dissection:  Multiple foci of IDC, largest at least 2.0 cm, overall grade 1; DCIS also present; IDC margin 3.4 cm; DCIS margin 3.4 cm; 3 lymph nodes with macrometastases; 2 lymph nodes with micrometastasis; no definite response to neoadjuvant chemotherapy in IDC; LVI present  >>ypT2 ypN2a (2.0 cm tumor; 5 lymph nodes positive) (no definitive response to neoadjuvant chemotherapy)  ER positive (60%). HI positive (99%). HER-2 not overexpressed (score 0). Ki-67 low proliferation (2%)  -No metastasis (CT C/A/P, bone scan: 07/01/2020; brain MRI: 07/07/2020)  -Normal BMD (DEXA: 07/01/2020)  -S/P adjuvant radiotherapy to right mastectomy bed 06/02/2020-08/07/2020  -for adjuvant endocrine therapy, started on monthly Lupron shots 07/28/2020  -for  adjuvant endocrine therapy, Arimidex started 09/20/2020  -03/24/2021:  Right BANG reconstruction of breast:  Right chest skin excision (no malignancy); MediPort removal   -07/14/2021:  2nd stage reconstruction: Left breast reduction, right nipple creation, fat grafting to right breast, and abdominal scar revision  -pelvic ultrasound 09/20/2021:  Uterine fibroid, 1 of them exophytic, adjacent to left ovary   -Pap smear 11/10/2021: NILM  -pelvic MRI 12/15/2021:  Exophytic uterine fibroid 4 cm between uterine fundus and left ovary, stable going back to 2019  -developed right upper extremity lymphedema post axillary lymph node dissection; used lymphedema sleeve and machine  -03/23/2022:  Right breast reconstruction revision with excision of fat necrosis and fat grafting to right breast  -DEXA scan 07/07/2020: Normal BMD but decreased  -pelvic MRI 12/09/2022:  Stable uterine fibroids (exophytic mass towards the fundus up to 3.5 cm, stable; 1.5 cm myometrial mass, stable)  -pelvic ultrasound 07/26/2023:  Stable uterine fibroids  >>>  # Metachronous hormone receptor positive invasive lobular carcinoma of left breast:  (failed adjuvant endocrine therapy with Lupron +Arimidex, which was started July/September 2020)  -mammogram/ultrasound 03/13/2024:  Left breast BI-RADS 4: Left upper outer breast 7 mm enhancing focal asymmetry, lesion of concern   -stereotactic biopsy left upper outer breast 7 mm lesion 03/22/2024:  Invasive lobular carcinoma, largest focus 2.1 mm   -ER 25%; IN 0%; HER2 score 0; Ki-67 low proliferation (1%)  -NGS testing on left breast biopsy performed 03/22/2024:  No genetic abnormalities detected for RNA gene fusions; nondiagnostic study (unable to amplified DNA); positive PD-L1 expression for Keytruda based on CPS 5; no PD-L1 expression based on TPS <1%  -CTs C/A/P 05/21/2024: No metastases  -bone scan 05/28/2024: No metastases  -DEXA scan 05/28/2024:  Normal BMD since 07/13/2023 DEXA scan  -CT head  without contrast 05/29/2024:  No metastases; no meningioma  -07/02/2024:  ([facility]; Remi Laughlin MD):  Left breast mastectomy with a tissue expander placement and left SLN biopsy: Invasive lobular carcinoma, 9 mm; margins negative; upper outer quadrant; overall grade 2; no DCIS; no LCIS; no LVI; all margins negative for invasive carcinoma (distance from invasive carcinoma to closest margin, 4 mm); # of lymph nodes examined, 1; # of SLN examined, 1; all regional lymph nodes negative for tumor (ER positive, HI negative, HER2 negative)  >>> pT1b pN0 (sn), grade 2, ER positive, HI negative, HER2 negative, AJCC anatomic stage IA, pathological prognostic stage I A  -07/15/2024: Before monthly Lupron shot:  Estradiol <24 pg/mL; FSH 3.06 mIU /ml (premenopausal)  -07/15/2024:  Monthly Lupron given at 11:41 a.m.  -Oncotype DX breast recurrence score: 13  -remains premenopausal as of 07/17/2025 (FSH 1.9) (checked before Lupron shot)  Presents for a follow-up visit.  Compliant with tamoxifen.  Today we will be her last Lupron shot.  She has completed 5 years of OFS with Lupron.  Tamoxifen continue until August 2029 when we will make a decision whether to switch her to aromatase inhibitor or to continue tamoxifen or stop endocrine therapy.  Neuropathy has improved significantly.  She takes gabapentin and Cymbalta only PRN.  No unusual headaches, focal neurological symptoms, chest pain, cough, dyspnea, abdominal pain, nausea, vomiting, change in bowel habits, GI bleeding, any new lumps or lymphadenopathy, weakness, fatigue, anorexia, unintentional weight loss, etc..  No urinary problems.  ECOG 0.  She is undergoing the process of left breast reconstruction in [place].  Appetite is okay.  She is an  at a nursing home. Denies history of tobacco, alcohol, or illicit drug abuse.  History of tubal ligation in 1999.    Interval History:  [No matching plan found]   [No matching plan found]      08/15/2024:   -Oncotype DX breast recurrence score: 13  -07/22/2024:  Hemoglobin 12.5; CBC essentially unremarkable; MCV 95.0, slightly elevated; CMP reviewed  Presents for a follow-up visit.  Overall, doing well.  Plans to have left breast reconstructed in [place], as well.  Appetite is okay.  No unusual headaches, focal neurological symptoms, chest pain, cough, dyspnea, abdominal pain, nausea, vomiting, etc..  Appetite is fair.  We discussed plan of adjuvant therapy.    07/17/2025:   -continues on monthly Lupron shots   -01/14/2025:  Thin prep cervical Pap smear:  NILM; high-risk HPV negative  -07/17/2025:  CBC unremarkable, hemoglobin 12.4; CMP reviewed, essentially unremarkable; FSH 1.29 (premenopausal)      Immunization History   Administered Date(s) Administered    COVID-19, MRNA, LN-S, PF (Pfizer) (Purple Cap) 10/01/2021, 10/22/2021    Influenza - Quadrivalent 12/07/2016, 11/12/2019, 11/12/2019, 10/12/2021, 10/12/2021    Influenza - Quadrivalent - PF *Preferred* (6 months and older) 09/22/2020, 11/08/2022    Pneumococcal Conjugate - 13 Valent 03/05/2020    Tdap 12/07/2016     Review of patient's allergies indicates:   Allergen Reactions    Sulfa (sulfonamide antibiotics) Itching and Rash     Other reaction(s): RASH, DIFFICULTY BREATHIN    Sulfamethoxazole-trimethoprim Itching and Rash     rash       Medications:  Current Outpatient Medications on File Prior to Visit   Medication Sig Dispense Refill    DULoxetine (CYMBALTA) 60 MG capsule Take 1 capsule (60 mg total) by mouth every evening. 30 capsule 4    gabapentin (NEURONTIN) 400 MG capsule Take 1 capsule (400 mg total) by mouth 3 (three) times daily. 120 capsule 3    hydrOXYzine HCL (ATARAX) 10 MG Tab Take 10 mg by mouth.      lisinopriL-hydrochlorothiazide (PRINZIDE,ZESTORETIC) 10-12.5 mg per tablet Take 1 tablet by mouth once daily. 90 tablet 3    tamoxifen (NOLVADEX) 20 MG Tab Take 1 tablet (20 mg total) by mouth once daily. 30 tablet 11     traMADoL (ULTRAM) 50 mg tablet Take 1 tablet (50 mg total) by mouth every 6 (six) hours as needed for Pain. 12 tablet 0    ondansetron (ZOFRAN) 4 MG tablet TAKE 1 TABLET BY MOUTH EVERY 8 HOURS AS NEEDED FOR NAUSEA (Patient not taking: Reported on 7/17/2025) 30 tablet 3    ondansetron (ZOFRAN-ODT) 4 MG TbDL Take 4 mg by mouth every 8 (eight) hours as needed. (Patient not taking: Reported on 7/17/2025)      vitamin D (VITAMIN D3) 1000 units Tab Take 1 tablet (1,000 Units total) by mouth once daily. (Patient not taking: Reported on 7/17/2025) 60 tablet 3     Current Facility-Administered Medications on File Prior to Visit   Medication Dose Route Frequency Provider Last Rate Last Admin    leuprolide injection 3.75 mg  3.75 mg Intramuscular 1 time in Clinic/HOD Lloyd Fraser MD         Review of Systems:   All systems reviewed and found to be negative except for the symptoms detailed above    Physical Examination:   VITAL SIGNS:   Vitals:    07/17/25 1335   BP: 118/87   Pulse: 76   Resp: 18   Temp: 98.4 °F (36.9 °C)       GENERAL:  In no apparent distress.    HEAD:  No signs of head trauma.  EYES:  Pupils are equal.  Extraocular motions intact.    EARS:  Hearing grossly intact.  MOUTH:  Oropharynx is normal.   NECK:  No adenopathy, no JVD.     CHEST:  Chest with clear breath sounds bilaterally.  No wheezes, rales, rhonchi.    CARDIAC:  Regular rate and rhythm.  S1 and S2, without murmurs, gallops, rubs.  VASCULAR:  No Edema.  Peripheral pulses normal and equal in all extremities.  ABDOMEN:  Soft, without detectable tenderness.  No sign of distention.  No   rebound or guarding, and no masses palpated.   Bowel Sounds normal.  MUSCULOSKELETAL:  Good range of motion of all major joints. Extremities without clubbing, cyanosis or edema.    NEUROLOGIC EXAM:  Alert and oriented x 3.  No focal sensory or strength deficits.   Speech normal.  Follows commands.  PSYCHIATRIC:  Mood normal.  11/11/2019: Bilateral breast  examination was performed with her verbal consent, in the presence of her mother and daughter, and the presence of Prenella, LPN; right breast is large, with orange peel appearance (erythema, erythematous and dimpled skin) involving <1/3 of the skin in the periareolar, 6:00, 3:00, and 9:00 positions; no nipple discharge; no palpable regional lymphadenopathy; left breast free from any suspicious lesions/masses or regional lymphadenopathy. No palpable hepatomegaly. Lungs clear to auscultation.  11/25/2019: Breast examination performed with her verbal consent, in the presence of Prenella, LPN; right breast is diffusely enlarged, approximately 13 cm x 12 cm enlargement in the lower quadrants; orange peel appearance of skin is noted along with some erythema, involving 3 o'clock position, 6 o'clock position, 9 o'clock position, and periareolar area, definitely approximately 50% of the skin of the breast.  02/05/2020: Right breast examined with her verbal consent, in the presence of nursing staff, and with her verbal consent: Right breast is much smaller and much softer without discretely palpable lump; no skin retraction, skin ulceration, fixation, erythema, or orange peel appearance; no nipple retraction; no palpable lymphadenopathy in axillary, infraclavicular, supraclavicular, or cervical areas.  03/18/2020: Right breast examined with her verbal consent, in the presence of Prenella, LPN; right breast is completely soft, without discrete masses, but still with some orange peel appearance of the skin in the lower half but without erythema; no palpable regional lymphadenopathy.  04/16/2020: Breast examination performed with her verbal consent, in the presence of Prenella, LPN; right breast is completely soft, without discrete masses; a hint of orange peel appearance of the skin in the lower half of the breast, with a slight hint of erythema; no ulceration or satellite nodules; no fixation to chest wall; no palpable  lymphadenopathy in axillary, supraclavicular, or infraclavicular areas.  04/30/2020: Breast examination performed with her verbal consent, in the presence of regular, LPN; right breast remains a supple, without discrete mass, with subtle darkening of skin in the lower half, without skin ulceration/satellitosis/fixation of breast tissue or nipple discharge; no lymphadenopathy palpable in right axilla; no tenderness in the right axilla. No supraclavicular or infraclavicular lymphadenopathy, either.    Assessment:  Problem List Items Addressed This Visit       Uterine fibroid    Lobular carcinoma of left breast    Invasive ductal carcinoma of right breast in female    H/O right mastectomy    Chemotherapy-induced neuropathy - Primary    Premenopausal patient    Family history of breast cancer    Family history of uterine cancer    Carcinoma of left breast in female, estrogen receptor positive    History of left mastectomy    Infiltrating ductal carcinoma of breast, right    Long-term current use of tamoxifen    S/P breast reconstruction    Encounter for follow-up surveillance of breast cancer     Orders for 07/17/2025:   Lupron shot today, then discontinue Lupron   Continue tamoxifen until 08/2029; further plans at the time  Annual gyn visits as long as tamoxifen  DEXA scan in May 2026  Continue gabapentin and Cymbalta for neuropathy  Follow-up with NP in 3 months     Above discussed with the patient.  All questions answered.    Discussed labs and scans and plan of management in detail.    She understands and agrees with this plan.  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), G2  -biopsy 10/01/2019  -genetic testing 12/02/2019:  Essentially negative  -ER positive, VA positive, HER2 negative  -cT4d cN1 M0, AJCC anatomic stage IIIB,  clinical prognostic stage IIIB  -neoadjuvant ddAC x4, then weekly Taxol X 12  -simple right mastectomy and axillary lymph node dissection 05/27/2020  -ypT2 ypN2a (2.0 cm tumor; 5 lymph nodes positive) (no definitive response to neoadjuvant chemotherapy)  (multifocal IDC, largest at least 2.0 cm; G1;, margin negative, 3 lymph nodes with a macrometastases, 2 lymph nodes with micrometastases, no definite response to neoadjuvant chemotherapy in IDC  -ER 60%, MT 99%, HER2 negative  -S/P post adjuvant radiotherapy  -adjuvant endocrine therapy:  Lupron started 07/08/2020; Arimidex started 09/20/2020  -right breast reconstruction, then revision  >>>  # Metachronous invasive lobular carcinoma left breast:  (failed adjuvant endocrine therapy with Lupron +Arimidex)  -biopsy 03/22/2024  -ER positive, MT negative, HER2 negative  -left breast mastectomy 07/02/2024  -pT1b pN0 (sn), grade 2, ER positive, MT negative, HER2 negative, AJCC anatomic stage IA, pathological prognostic stage IA  -recurrence score was <15, therefore, did not require adjuvant chemotherapy  -there was no indication of adjuvant radiotherapy  -07/15/2024: Labs: Drawn before monthly Lupron shot: Premenopausal  -Oncotype DX breast recurrence score 13  -adjuvant tamoxifen started 08/15/2024 (in addition to ongoing Lupron which was started earlier on 07/08/2020)   -remains premenopausal as of 07/17/2025 (FSH 1.9) (checked before Lupron shot)  >>>   Plan:  -adjuvant endocrine therapy plan:  OFS with Lupron +tamoxifen  -Lupron to continue for a total of 5 years, i.e., 07/28/2020-07/2025  -discontinue Lupron after last dose to be administered today  -tamoxifen was started 08/15/2024; continue for 5 years, i.e., until 08/2029; at that point, if becomes postmenopausal, then, aromatase inhibitor for 5 years (category 1 option) continue tamoxifen for an additional 5 years to complete 10 years; if remains premenopausal after 5 years of tamoxifen, then, consider tamoxifen  for an additional 5 years to complete 10 years or no further endocrine therapy  -DEXA scan in 2 years (May 2026)  -In the absence of clinical signs or symptoms suggestive of recurrent disease, there is no indication for laboratory or imaging studies for metastasis screening;  -Active lifestyle, healthy diet, limited alcohol intake, and achieving and maintaining an ideal body weight (20-25 BMI) may lead to optimal breast cancer outcomes  -she has reconstructed right breast; routine imaging of reconstructed breasts is not indicated  -as of 08/15/2024, she is planning to have left breast reconstructed as well    Monitoring parameters with tamoxifen:  CBC with platelets, serum calcium, liver function tests;  triglycerides and cholesterol (in patients with preexisting hyperlipidemias);  pregnancy test (prior to treatment in females of reproductive potential);  monitor for and promptly evaluate abnormal vaginal bleeding, menstrual irregularities, changes in vaginal discharge, or pelvic pain/pressure;  gynecologic exam (baseline and annually, continuing after discontinuation of therapy),  signs/symptoms of thromboembolism (eg, stroke, DVT [leg swelling, tenderness], or PE [shortness of breath]);  ophthalmic exam (if vision problem or cataracts);  bone mineral density (premenopausal women). Monitor adherence.  -Abnormal vaginal bleeding on tamoxifen therapy should prompt evaluation for endometrial cancer if uterus intact.   If anticipated elective surgery while on tamoxifen, then consider discontinuing tamoxifen prior to elective surgery, and resume tamoxifen postoperatively when ambulation is normal.   If DVT, PE, CVA, or prolonged immobilization while on tamoxifen, then discontinue tamoxifen, and treat underlying condition.     # Molecular testing:  -genetic testing 12/02/2019:  Essentially negative  -liquid biopsy 04/15/2024: Negative for any relevant findings  -NGS testing on left breast biopsy performed 03/22/2024:  No  genetic abnormalities detected for RNA gene fusions; nondiagnostic study (unable to amplified DNA); positive PD-L1 expression for Keytruda based on CPS 5; no PD-L1 expression based on TPS <1%    #Taxol induced peripheral neuropathy:  -Gabapentin started 02/19/2020  -Cymbalta 60 mg p.o. nightly started 03/18/2020  -gabapentin started 02/19/2020; Cymbalta started 03/18/2020  -07/17/2025: Neuropathy has improved significantly; she takes gabapentin and Cymbalta only on p.r.n. basis    #Premenopausal as of 11/11/2019 by history:  -as of 07/09/2020, no menstrual cycles since December 2019 (after starting neoadjuvant chemotherapy)  -premenopausal as of 07/15/2024, as well (per labs checked before monthly Lupron shot)  -remains premenopausal as of 07/17/2025 (FSH 1.9) (checked before Lupron shot)    # Family history of breast cancer and uterine cancers:  -12/02/2019: Genetic testing: Three (3) variants of uncertain significance (VUS): AXIN2 c.1573C>G (p.[id number]), msh3 c.2005C>T (p.Kmj876Wbs), and POLE c.1007A>G (p.Rle129Kis)    # Exophytic fibroid versus left adnexal mass (4 cm, solid) on CT 11/22/2019:  -12/03/2019: Pelvic ultrasound: No discrete sonographic pathology  -pelvic ultrasound 09/20/2021:  Uterine fibroid, 1 of them exophytic, adjacent to left ovary   -Pap smear 11/10/2021: NILM  -pelvic MRI 12/15/2021:  Exophytic uterine fibroid 4 cm between uterine fundus and left ovary, stable going back to 2019  -pelvic MRI 12/09/2022:  Stable uterine fibroids (exophytic mass towards the fundus up to 3.5 cm, stable; 1.5 cm myometrial mass, stable)  -pelvic ultrasound 07/26/2023:  Stable uterine fibroids      Breast cancer surveillance:  -History and physical exam 1-4 times per year for 5 years, then annually;  -she is S/P bilateral mastectomy, therefore, no role of surveillance mammography  -she has reconstructed right breast; routine imaging of reconstructed breasts is not indicated  -as of 08/15/2024, she is planning to  have left breast reconstructed as well  -Woman or tamoxifen: Annual GYN assessment if uterus present;  -In the absence of clinical signs or symptoms suggestive of recurrent disease, there is no indication for laboratory or imaging studies for metastasis screening;  -Active lifestyle, healthy diet, limited alcohol intake, and achieving and maintaining an ideal body weight (20-25 BMI) may lead to optimal breast cancer outcomes.     Follow-up:  No follow-ups on file.

## 2025-07-17 NOTE — Clinical Note
Orders for 07/17/2025:  Lupron shot today, then discontinue Lupron  Continue tamoxifen until 8029; further plans at the time DEXA scan in May 2026 Continue gabapentin and Cymbalta for neuropathy Follow-up with NP in 3 months

## 2025-07-17 NOTE — NURSING
3500 Patient is here for labs, MD visit & C41 Lupron 3.75mgs IM (last dose). She voices no c/o.    Lupron given to Left Dorsalgluteal.        Ma called pt no answer message left on pt vm regarding an appt

## 2025-07-24 ENCOUNTER — TELEPHONE (OUTPATIENT)
Dept: RESEARCH | Facility: HOSPITAL | Age: 50
End: 2025-07-24

## 2025-09-03 DIAGNOSIS — N95.9 PREMENOPAUSAL PATIENT: ICD-10-CM

## 2025-09-03 DIAGNOSIS — D25.9 UTERINE LEIOMYOMA, UNSPECIFIED LOCATION: ICD-10-CM

## 2025-09-03 DIAGNOSIS — C50.912 LOBULAR CARCINOMA OF LEFT BREAST: ICD-10-CM

## 2025-09-03 RX ORDER — TAMOXIFEN CITRATE 20 MG/1
20 TABLET ORAL DAILY
Qty: 30 TABLET | Refills: 11 | Status: SHIPPED | OUTPATIENT
Start: 2025-09-03

## 2025-09-04 ENCOUNTER — OFFICE VISIT (OUTPATIENT)
Facility: CLINIC | Age: 50
End: 2025-09-04

## 2025-09-04 VITALS
RESPIRATION RATE: 18 BRPM | BODY MASS INDEX: 34.12 KG/M2 | WEIGHT: 199.88 LBS | HEART RATE: 83 BPM | HEIGHT: 64 IN | SYSTOLIC BLOOD PRESSURE: 126 MMHG | OXYGEN SATURATION: 98 % | TEMPERATURE: 98 F | DIASTOLIC BLOOD PRESSURE: 85 MMHG

## 2025-09-04 DIAGNOSIS — C50.911 INVASIVE DUCTAL CARCINOMA OF RIGHT BREAST IN FEMALE: ICD-10-CM

## 2025-09-04 DIAGNOSIS — Z80.49 FAMILY HISTORY OF UTERINE CANCER: ICD-10-CM

## 2025-09-04 DIAGNOSIS — D25.9 UTERINE LEIOMYOMA, UNSPECIFIED LOCATION: ICD-10-CM

## 2025-09-04 DIAGNOSIS — C50.919 MALIGNANT NEOPLASM OF FEMALE BREAST, UNSPECIFIED ESTROGEN RECEPTOR STATUS, UNSPECIFIED LATERALITY, UNSPECIFIED SITE OF BREAST: ICD-10-CM

## 2025-09-04 DIAGNOSIS — B37.31 VAGINAL CANDIDIASIS: ICD-10-CM

## 2025-09-04 DIAGNOSIS — T45.1X5A CHEMOTHERAPY-INDUCED NEUROPATHY: Primary | ICD-10-CM

## 2025-09-04 DIAGNOSIS — C50.912 LOBULAR CARCINOMA OF LEFT BREAST: ICD-10-CM

## 2025-09-04 DIAGNOSIS — G62.0 CHEMOTHERAPY-INDUCED NEUROPATHY: Primary | ICD-10-CM

## 2025-09-04 DIAGNOSIS — Z90.12 HISTORY OF LEFT MASTECTOMY: ICD-10-CM

## 2025-09-04 DIAGNOSIS — N89.8 VAGINAL DISCHARGE: ICD-10-CM

## 2025-09-04 DIAGNOSIS — Z80.3 FAMILY HISTORY OF BREAST CANCER: ICD-10-CM

## 2025-09-04 DIAGNOSIS — Z98.890 S/P BREAST RECONSTRUCTION: ICD-10-CM

## 2025-09-04 PROCEDURE — 99213 OFFICE O/P EST LOW 20 MIN: CPT | Mod: PBBFAC,PN
